# Patient Record
Sex: MALE | Race: WHITE | NOT HISPANIC OR LATINO | Employment: FULL TIME | URBAN - NONMETROPOLITAN AREA
[De-identification: names, ages, dates, MRNs, and addresses within clinical notes are randomized per-mention and may not be internally consistent; named-entity substitution may affect disease eponyms.]

---

## 2017-01-26 DIAGNOSIS — I10 ESSENTIAL HYPERTENSION: Primary | ICD-10-CM

## 2017-01-30 RX ORDER — METOPROLOL SUCCINATE 100 MG/1
TABLET, EXTENDED RELEASE ORAL
Qty: 90 TABLET | Refills: 2 | Status: SHIPPED | OUTPATIENT
Start: 2017-01-30 | End: 2017-04-20

## 2017-01-30 NOTE — TELEPHONE ENCOUNTER
Metoprolol ER Succ 100mg tab      Last Written Prescription Date: 9-  Last Fill Quantity: 90, # refills: 0  Last Office Visit with [facility], [facility] or [facility] prescribing provider: 11-       POTASSIUM   Date Value Ref Range Status   04/15/2016 4.3 3.4 - 5.3 mmol/L Final     CREATININE   Date Value Ref Range Status   04/15/2016 0.77 0.66 - 1.25 mg/dL Final     BP Readings from Last 3 Encounters:   11/15/16 132/81   10/05/16 157/90   09/27/16 170/99

## 2017-02-03 DIAGNOSIS — I10 ESSENTIAL HYPERTENSION: ICD-10-CM

## 2017-02-03 DIAGNOSIS — E78.00 PURE HYPERCHOLESTEROLEMIA: Primary | ICD-10-CM

## 2017-02-03 DIAGNOSIS — E78.2 ELEVATED TRIGLYCERIDES WITH HIGH CHOLESTEROL: ICD-10-CM

## 2017-02-03 RX ORDER — GEMFIBROZIL 600 MG/1
600 TABLET, FILM COATED ORAL 2 TIMES DAILY
Qty: 180 TABLET | Refills: 0 | Status: SHIPPED | OUTPATIENT
Start: 2017-02-03 | End: 2017-04-06

## 2017-02-03 RX ORDER — LISINOPRIL 10 MG/1
10 TABLET ORAL DAILY
Qty: 90 TABLET | Refills: 2 | Status: SHIPPED | OUTPATIENT
Start: 2017-02-03 | End: 2017-09-26

## 2017-02-03 RX ORDER — ATORVASTATIN CALCIUM 10 MG/1
10 TABLET, FILM COATED ORAL DAILY
Qty: 90 TABLET | Refills: 0 | Status: SHIPPED | OUTPATIENT
Start: 2017-02-03 | End: 2017-04-06

## 2017-04-06 DIAGNOSIS — E78.00 PURE HYPERCHOLESTEROLEMIA: ICD-10-CM

## 2017-04-06 DIAGNOSIS — E78.2 ELEVATED TRIGLYCERIDES WITH HIGH CHOLESTEROL: ICD-10-CM

## 2017-04-07 RX ORDER — GEMFIBROZIL 600 MG/1
TABLET, FILM COATED ORAL
Qty: 180 TABLET | Refills: 0 | Status: SHIPPED | OUTPATIENT
Start: 2017-04-07 | End: 2017-06-05

## 2017-04-07 RX ORDER — ATORVASTATIN CALCIUM 10 MG/1
TABLET, FILM COATED ORAL
Qty: 90 TABLET | Refills: 0 | Status: SHIPPED | OUTPATIENT
Start: 2017-04-07 | End: 2017-06-05

## 2017-04-07 NOTE — TELEPHONE ENCOUNTER
lipitor     Last Written Prescription Date: 2/3/17  Last Fill Quantity: 90, # refills: 0  Last Office Visit with [facility], [facility] or [facility] prescribing provider: 11/15/16       Lab Results   Component Value Date    CHOL 155 11/30/2015     Lab Results   Component Value Date    HDL 35 11/30/2015     Lab Results   Component Value Date    LDL 87 11/30/2015     Lab Results   Component Value Date    TRIG 163 11/30/2015     Lab Results   Component Value Date    CHOLHDLRATIO 4.6 10/02/2014     lopid       Last Written Prescription Date: 2/3/17  Last Fill Quantity: 180, # refills: 0    Last Office Visit with [facility], [facility] or [facility] prescribing provider:  11/15/16   Future Office Visit:      Cholesterol   Date Value Ref Range Status   11/30/2015 155 <200 mg/dL Final     HDL Cholesterol   Date Value Ref Range Status   11/30/2015 35 (L) >39 mg/dL Final     LDL Cholesterol Calculated   Date Value Ref Range Status   11/30/2015 87 <100 mg/dL Final     Comment:     Desirable:       <100 mg/dl     Triglycerides   Date Value Ref Range Status   11/30/2015 163 (H) <150 mg/dL Final     Comment:     Borderline high:  150-199 mg/dl   High:             200-499 mg/dl   Very high:       >499 mg/dl   Fasting specimen       Cholesterol/HDL Ratio   Date Value Ref Range Status   10/02/2014 4.6 0.0 - 5.0 Final     ALT   Date Value Ref Range Status   01/29/2014 67 12 - 78 U/L Final

## 2017-04-16 ENCOUNTER — HOSPITAL ENCOUNTER (EMERGENCY)
Facility: HOSPITAL | Age: 47
Discharge: HOME | End: 2017-04-16
Attending: PHYSICIAN ASSISTANT | Admitting: PHYSICIAN ASSISTANT
Payer: COMMERCIAL

## 2017-04-16 VITALS — OXYGEN SATURATION: 97 % | DIASTOLIC BLOOD PRESSURE: 93 MMHG | SYSTOLIC BLOOD PRESSURE: 149 MMHG | TEMPERATURE: 98.6 F

## 2017-04-16 DIAGNOSIS — Z23 NEED FOR DIPHTHERIA-TETANUS-PERTUSSIS (TDAP) VACCINE, ADULT/ADOLESCENT: ICD-10-CM

## 2017-04-16 DIAGNOSIS — L03.012 PARONYCHIA OF LEFT THUMB: ICD-10-CM

## 2017-04-16 PROCEDURE — 90471 IMMUNIZATION ADMIN: CPT

## 2017-04-16 PROCEDURE — 99214 OFFICE O/P EST MOD 30 MIN: CPT | Mod: 25

## 2017-04-16 PROCEDURE — 90715 TDAP VACCINE 7 YRS/> IM: CPT | Performed by: PHYSICIAN ASSISTANT

## 2017-04-16 PROCEDURE — 25000125 ZZHC RX 250: Performed by: PHYSICIAN ASSISTANT

## 2017-04-16 PROCEDURE — 99213 OFFICE O/P EST LOW 20 MIN: CPT | Performed by: PHYSICIAN ASSISTANT

## 2017-04-16 RX ORDER — MUPIROCIN 20 MG/G
OINTMENT TOPICAL 2 TIMES DAILY
Qty: 15 G | Refills: 0 | Status: SHIPPED | OUTPATIENT
Start: 2017-04-16 | End: 2018-03-04

## 2017-04-16 RX ORDER — SULFAMETHOXAZOLE/TRIMETHOPRIM 800-160 MG
1 TABLET ORAL 2 TIMES DAILY
Qty: 20 TABLET | Refills: 0 | Status: SHIPPED | OUTPATIENT
Start: 2017-04-16 | End: 2017-04-26

## 2017-04-16 RX ADMIN — CLOSTRIDIUM TETANI TOXOID ANTIGEN (FORMALDEHYDE INACTIVATED), CORYNEBACTERIUM DIPHTHERIAE TOXOID ANTIGEN (FORMALDEHYDE INACTIVATED), BORDETELLA PERTUSSIS TOXOID ANTIGEN (GLUTARALDEHYDE INACTIVATED), BORDETELLA PERTUSSIS FILAMENTOUS HEMAGGLUTININ ANTIGEN (FORMALDEHYDE INACTIVATED), BORDETELLA PERTUSSIS PERTACTIN ANTIGEN, AND BORDETELLA PERTUSSIS FIMBRIAE 2/3 ANTIGEN 0.5 ML: 5; 2; 2.5; 5; 3; 5 INJECTION, SUSPENSION INTRAMUSCULAR at 11:14

## 2017-04-16 NOTE — ED AVS SNAPSHOT
[facility]    [address] [place] [address]    Phone:  [phone]                                       Jose J Marley   MRN: [id number]    Department:  [facility]   Date of Visit:  4/16/2017           After Visit Summary Signature Page     I have received my discharge instructions, and my questions have been answered. I have discussed any challenges I see with this plan with the nurse or doctor.    ..........................................................................................................................................  Patient/Patient Representative Signature      ..........................................................................................................................................  Patient Representative Print Name and Relationship to Patient    ..................................................               ................................................  Date                                            Time    ..........................................................................................................................................  Reviewed by Signature/Title    ...................................................              ..............................................  Date                                                            Time

## 2017-04-16 NOTE — ED AVS SNAPSHOT
[facility]    [address]    [place] [address]    Phone:  [phone]                                       Jose J Marley   MRN: [id number]    Department:  [facility]   Date of Visit:  4/16/2017           Patient Information     Date Of Birth          1970        Your diagnoses for this visit were:     Paronychia of left thumb     Need for diphtheria-tetanus-pertussis (Tdap) vaccine, adult/adolescent        You were seen by Safia Marcus PA.      Follow-up Information     Follow up with KRISTINE Crockett MD.    Specialty:  Family Practice    Why:  If symptoms worsen    Contact information:    [facility] [place]  [address]  [place] [address]  [phone]          Follow up with [facility].    Specialty:  EMERGENCY MEDICINE    Why:  IF FURTHER CONCERNS DEVELOP    Contact information:    [address] [place], [place] [address] [address] [phone]    Additional information:    From [facility]: Take [address]. Turn left at [address]. Turn left at the first stoplight on [address]. At the first stop sign, take a right onto [address]. Take a left into the parking lot and continue through until you reach the North enterance of the building.       From [place]: Take [address]. Take the [address] ramp towards [place]. Turn left onto [address]. Take a slight right onto [address]. Turn left at the first stoplight on [address]. At the first stop sign, take a right onto [address]. Take a left into the parking lot and continue through until you reach the North enterance of the building.       From [place]: Take [address]. Take a right at [address]. At the first stop sign, take a right onto [address]. Take a left into the parking lot and continue through until you reach the North enterance of the building.         Discharge Instructions        REMEMBER:  AIR DRY AS MUCH AS POSSIBLE    Discharge References/Attachments     PARONYCHIA (ENGLISH)         Review of your medicines      START taking        Dose / Directions Last dose taken    mupirocin 2 % ointment   Commonly known as:  BACTROBAN   Quantity:  15 g        Apply topically 2 times daily   Refills:  0        sulfamethoxazole-trimethoprim 800-160 MG per tablet   Commonly known as:  BACTRIM DS/SEPTRA DS   Dose:  1 tablet   Quantity:  20 tablet        Take 1 tablet by mouth 2 times daily for 10 days   Refills:  0          Our records show that you are taking the medicines listed below. If these are incorrect, please call your family doctor or clinic.        Dose / Directions Last dose taken    aspirin 81 MG tablet        Take  by mouth daily.   Refills:  0        atorvastatin 10 MG tablet   Commonly known as:  LIPITOR   Quantity:  90 tablet        Take 1 tablet by mouth  daily   Refills:  0        fish oil-omega-3 fatty acids 1000 MG capsule   Dose:  1 g        Take 1 g by mouth 2 times daily.   Refills:  0        GARLIC        daily.   Refills:  0        gemfibrozil 600 MG tablet   Commonly known as:  LOPID   Quantity:  180 tablet        Take 1 tablet by mouth two  times daily   Refills:  0        IBUPROFEN PO   Dose:  800 mg        Take 800 mg by mouth every 6 hours as needed for moderate pain   Refills:  0        lisinopril 10 MG tablet   Commonly known as:  PRINIVIL/ZESTRIL   Dose:  10 mg   Quantity:  90 tablet        Take 1 tablet (10 mg) by mouth daily   Refills:  2        metoprolol 100 MG 24 hr tablet   Commonly known as:  TOPROL-XL   Quantity:  90 tablet        TAKE ONE TABLET BY MOUTH DAILY   Refills:  2        MULTIVITAMIN PO        Take  by mouth daily.   Refills:  0                Prescriptions were sent or printed at these locations (2 Prescriptions)                   [id number] - [place] - [address] AT [facility] [address], [place]  [address]    Telephone:  [phone]   Fax:  [phone]   Hours:                  E-Prescribed (2 of 2)         sulfamethoxazole-trimethoprim (BACTRIM DS/SEPTRA DS) 800-160 MG per tablet               mupirocin (BACTROBAN) 2 % ointment                Orders Needing Specimen Collection     None      Pending Results     No orders found from 4/14/2017 to 4/17/2017.            Pending Culture Results     No orders found from 4/14/2017 to 4/17/2017.            Thank you for choosing [facility]       Thank you for choosing [facility] for your care. Our goal is always to provide you with excellent care. Hearing back from our patients is one way we can continue to improve our services. Please take a few minutes to complete the written survey that you may receive in the mail after you visit with us. Thank you!        [facility] Information     [facility] gives you secure access to your electronic health record. If you see a primary care provider, you can also send messages to your care team and make appointments. If you have questions, please call your primary care clinic.  If you do not have a primary care provider, please call [phone] and they will assist you.        Care EveryWhere ID     This is your Care EveryWhere ID. This could be used by other organizations to access your [facility] medical records  [id number]        After Visit Summary       This is your record. Keep this with you and show to your community pharmacist(s) and doctor(s) at your next visit.

## 2017-04-16 NOTE — ED NOTES
C/o left thumb pain and redness that started this morning. States picked at a hang nail yesterday. Denies being diabetic. Rates pain 6/10 and took Ibuprofen 800 mg an hour before coming in. Denies fevers. Unsure of tetanus status.

## 2017-04-16 NOTE — ED NOTES
Pt presents with reddened and swollen left thumb. Pt states there was green pus coming from wound this morning. He clipped a hangnail last night from left thumb.

## 2017-04-17 ASSESSMENT — ENCOUNTER SYMPTOMS
WOUND: 1
CONSTITUTIONAL NEGATIVE: 1
CARDIOVASCULAR NEGATIVE: 1
PSYCHIATRIC NEGATIVE: 1
NEUROLOGICAL NEGATIVE: 1

## 2017-04-17 NOTE — ED PROVIDER NOTES
History     Chief Complaint   Patient presents with     Wound Infection     left thumb redness and swelling around hang nail that he pulled off. States started this morning.      The history is provided by the patient.     Jose J Marley is a 47 year old male who has one day of developing left thumb redness/swelling and pain. Pt admits he bites and pick at his cuticles often. Denies fever or any other injury at this time    Allergies as of 04/16/2017     (No Known Allergies)     Discharge Medication List as of 4/16/2017 11:07 AM      START taking these medications    Details   sulfamethoxazole-trimethoprim (BACTRIM DS/SEPTRA DS) 800-160 MG per tablet Take 1 tablet by mouth 2 times daily for 10 days, Disp-20 tablet, R-0, E-Prescribe      mupirocin (BACTROBAN) 2 % ointment Apply topically 2 times dailyDisp-15 g, [id number]         CONTINUE these medications which have NOT CHANGED    Details   IBUPROFEN PO Take 800 mg by mouth every 6 hours as needed for moderate pain, Historical      gemfibrozil (LOPID) 600 MG tablet Take 1 tablet by mouth two  times daily, Disp-180 tablet, R-0, E-Prescribe      atorvastatin (LIPITOR) 10 MG tablet Take 1 tablet by mouth  daily, Disp-90 tablet, R-0, E-Prescribe      lisinopril (PRINIVIL/ZESTRIL) 10 MG tablet Take 1 tablet (10 mg) by mouth daily, Disp-90 tablet, R-2, E-Prescribe      metoprolol (TOPROL-XL) 100 MG 24 hr tablet TAKE ONE TABLET BY MOUTH DAILY, Disp-90 tablet, R-2, E-Prescribe      aspirin 81 MG tablet Take  by mouth daily., Historical      fish oil-omega-3 fatty acids (FISH OIL) 1000 MG capsule Take 1 g by mouth 2 times daily., Historical      GARLIC daily., Historical      Multiple Vitamins-Minerals (MULTIVITAMIN OR) Take  by mouth daily., Historical           Past Medical History:   Diagnosis Date     Obesity, unspecified 01/01/2011     Pure hypercholesterolemia 08/04/2008     Unspecified essential hypertension 12/20/2006     Past Surgical History:   Procedure  Laterality Date     EXCISE MASS FINGER Right 4/21/2016    Procedure: EXCISE MASS FINGER;  Surgeon: Bimal Mitchell MD;  Location: [facility]     HERNIA REPAIR  2011    umbilical     HERNIA REPAIR  1972    inguinal     Family History   Problem Relation Age of Onset     C.A.D. Maternal Grandmother 67     cause of death     Hypertension Mother      Lipids Mother      hyperlipidemia     Social History     Social History     Marital status:      Spouse name: N/A     Number of children: N/A     Years of education: N/A     Occupational History     [employer]  - Full-Time      Social History Main Topics     Smoking status: Never Smoker     Smokeless tobacco: Never Used     Alcohol use Yes      Comment: Rarely      Drug use: No     Sexual activity: Not Asked     Other Topics Concern     Caffeine Concern Yes     Soda, 24 oz daily      Parent/Sibling W/ Cabg, Mi Or Angioplasty Before 65f 55m? Yes     father     Social History Narrative         I have reviewed the Medications, Allergies, Past Medical and Surgical History, and Social History in the Epic system.    Review of Systems   Constitutional: Negative.    Cardiovascular: Negative.    Skin: Positive for wound.   Neurological: Negative.    Psychiatric/Behavioral: Negative.        Physical Exam   BP: 149/93  Heart Rate: 88  Temp: 98.6  F (37  C)  SpO2: 97 %  Physical Exam   Constitutional: He is oriented to person, place, and time. He appears well-developed and well-nourished. No distress.   Cardiovascular: Normal rate.    Pulmonary/Chest: Effort normal.   Musculoskeletal:   Left thumb: medial side with moderate edema/erythema. No area of fluctuance.  +AFROM, 5/5 strength. M/n/v intact. Moderate TTP   Neurological: He is alert and oriented to person, place, and time.   Skin: He is not diaphoretic.   Psychiatric: He has a normal mood and affect.   Nursing note and vitals reviewed.      ED Course     ED Course     Procedures         Medications   Tdap  (tetanus-diphtheria-acell pertussis) (ADACEL) injection 0.5 mL (0.5 mLs Intramuscular Given 4/16/17 1114)   pt observed x 20 minutes. Pt tolerated well      Assessments & Plan (with Medical Decision Making)     I have reviewed the nursing notes.    I have reviewed the findings, diagnosis, plan and need for follow up with the patient.    Discharge Medication List as of 4/16/2017 11:07 AM      START taking these medications    Details   sulfamethoxazole-trimethoprim (BACTRIM DS/SEPTRA DS) 800-160 MG per tablet Take 1 tablet by mouth 2 times daily for 10 days, Disp-20 tablet, R-0, E-Prescribe      mupirocin (BACTROBAN) 2 % ointment Apply topically 2 times dailyDisp-15 g, [id number]             Final diagnoses:   Paronychia of left thumb   Need for diphtheria-tetanus-pertussis (Tdap) vaccine, adult/adolescent         Stop picking at your cuticles!!!!  Keep area clean/dry  Patient verbally educated and given appropriate education sheets for the diagnoses and has no questions.  Take medications as directed.   Follow up with your Primary Care provider if symptoms increase or if concerns develop, return to the ER  Safia Marcus Certified  Physician Assistant  4/17/2017  6:05 PM  URGENT CARE CLINIC      4/16/2017   [facility]     Safia Marcus PA  04/17/17 [id number]

## 2017-04-20 DIAGNOSIS — I10 ESSENTIAL HYPERTENSION: ICD-10-CM

## 2017-04-20 RX ORDER — METOPROLOL SUCCINATE 100 MG/1
100 TABLET, EXTENDED RELEASE ORAL DAILY
Qty: 90 TABLET | Refills: 1 | Status: SHIPPED | OUTPATIENT
Start: 2017-04-20 | End: 2017-08-05

## 2017-06-05 DIAGNOSIS — E78.00 PURE HYPERCHOLESTEROLEMIA: ICD-10-CM

## 2017-06-05 DIAGNOSIS — E78.2 ELEVATED TRIGLYCERIDES WITH HIGH CHOLESTEROL: ICD-10-CM

## 2017-06-07 RX ORDER — GEMFIBROZIL 600 MG/1
TABLET, FILM COATED ORAL
Qty: 180 TABLET | Refills: 0 | Status: SHIPPED | OUTPATIENT
Start: 2017-06-07 | End: 2017-10-25

## 2017-06-07 RX ORDER — ATORVASTATIN CALCIUM 10 MG/1
TABLET, FILM COATED ORAL
Qty: 90 TABLET | Refills: 0 | Status: SHIPPED | OUTPATIENT
Start: 2017-06-07 | End: 2017-08-05

## 2017-08-05 DIAGNOSIS — E78.00 PURE HYPERCHOLESTEROLEMIA: ICD-10-CM

## 2017-08-05 DIAGNOSIS — I10 ESSENTIAL HYPERTENSION: ICD-10-CM

## 2017-08-05 NOTE — LETTER
[facility] [place]  [address] [place] [address]  Phone: [phone]    August 9, 2017       Jose J Marley  [address] [place] [address]            Dear Jose J:    We are concerned about your health care.  We recently provided you with medication refills.  Lab tests (Lipid Profile) are required every 12 months in order to continue refilling your Lipitor.  Orders have been placed in our computer and should be accessible at most [facility] labs. Please complete this lab work as soon as possible.        Thank You,      Kris Crockett MD

## 2017-08-09 RX ORDER — METOPROLOL SUCCINATE 100 MG/1
TABLET, EXTENDED RELEASE ORAL
Qty: 90 TABLET | Refills: 0 | Status: SHIPPED | OUTPATIENT
Start: 2017-08-09 | End: 2017-10-25

## 2017-08-09 RX ORDER — ATORVASTATIN CALCIUM 10 MG/1
TABLET, FILM COATED ORAL
Qty: 30 TABLET | Refills: 0 | Status: SHIPPED | OUTPATIENT
Start: 2017-08-09 | End: 2017-10-25

## 2017-08-09 NOTE — TELEPHONE ENCOUNTER
lipitor     Last Written Prescription Date: 6/7/17  Last Fill Quantity: 90, # refills: 0  Last Office Visit with [facility] or [facility] prescribing provider: 11/15/16       Lab Results   Component Value Date    CHOL 155 11/30/2015     Lab Results   Component Value Date    HDL 35 11/30/2015     Lab Results   Component Value Date    LDL 87 11/30/2015     Lab Results   Component Value Date    TRIG 163 11/30/2015     Lab Results   Component Value Date    CHOLHDLRATIO 4.6 10/02/2014     metoprolol      Last Written Prescription Date: 4/20/17  Last Fill Quantity: 90, # refills: 1  Last Office Visit with [facility], [facility] or [facility] prescribing provider: 11/15/16       Potassium   Date Value Ref Range Status   04/15/2016 4.3 3.4 - 5.3 mmol/L Final     Creatinine   Date Value Ref Range Status   04/15/2016 0.77 0.66 - 1.25 mg/dL Final     BP Readings from Last 3 Encounters:   04/16/17 149/93   11/15/16 132/81   10/05/16 157/90

## 2017-09-19 ENCOUNTER — MYC MEDICAL ADVICE (OUTPATIENT)
Dept: FAMILY MEDICINE | Facility: OTHER | Age: 47
End: 2017-09-19
Payer: COMMERCIAL

## 2017-09-19 ENCOUNTER — RESULTS ONLY (OUTPATIENT)
Dept: FAMILY MEDICINE | Facility: OTHER | Age: 47
End: 2017-09-19

## 2017-09-19 DIAGNOSIS — E78.00 PURE HYPERCHOLESTEROLEMIA: ICD-10-CM

## 2017-09-19 DIAGNOSIS — Z13.1 SCREENING FOR DIABETES MELLITUS: Primary | ICD-10-CM

## 2017-09-19 LAB
CHOLEST SERPL-MCNC: 142 MG/DL
EST. AVERAGE GLUCOSE BLD GHB EST-MCNC: 134 MG/DL
HBA1C MFR BLD: 6.3 % (ref 4.3–6)
HDLC SERPL-MCNC: 36 MG/DL
LDLC SERPL CALC-MCNC: 84 MG/DL
NONHDLC SERPL-MCNC: 106 MG/DL
TRIGL SERPL-MCNC: 112 MG/DL

## 2017-09-19 PROCEDURE — 83036 HEMOGLOBIN GLYCOSYLATED A1C: CPT | Performed by: FAMILY MEDICINE

## 2017-09-19 PROCEDURE — 36415 COLL VENOUS BLD VENIPUNCTURE: CPT | Performed by: FAMILY MEDICINE

## 2017-09-19 PROCEDURE — 80061 LIPID PANEL: CPT | Performed by: FAMILY MEDICINE

## 2017-09-19 PROCEDURE — 40000788 ZZHCL STATISTIC ESTIMATED AVERAGE GLUCOSE: Performed by: FAMILY MEDICINE

## 2017-09-26 DIAGNOSIS — I10 ESSENTIAL HYPERTENSION: ICD-10-CM

## 2017-09-27 RX ORDER — LISINOPRIL 10 MG/1
TABLET ORAL
Qty: 90 TABLET | Refills: 0 | Status: SHIPPED | OUTPATIENT
Start: 2017-09-27 | End: 2017-10-25

## 2017-10-25 ENCOUNTER — TELEPHONE (OUTPATIENT)
Dept: FAMILY MEDICINE | Facility: OTHER | Age: 47
End: 2017-10-25

## 2017-10-25 DIAGNOSIS — I10 ESSENTIAL HYPERTENSION: ICD-10-CM

## 2017-10-25 DIAGNOSIS — E78.00 PURE HYPERCHOLESTEROLEMIA: ICD-10-CM

## 2017-10-25 DIAGNOSIS — E78.2 ELEVATED TRIGLYCERIDES WITH HIGH CHOLESTEROL: ICD-10-CM

## 2017-10-25 RX ORDER — METOPROLOL SUCCINATE 100 MG/1
TABLET, EXTENDED RELEASE ORAL
Qty: 4 TABLET | Refills: 0 | Status: SHIPPED | OUTPATIENT
Start: 2017-10-25 | End: 2017-12-17

## 2017-10-25 RX ORDER — GEMFIBROZIL 600 MG/1
TABLET, FILM COATED ORAL
Qty: 8 TABLET | Refills: 0 | Status: SHIPPED | OUTPATIENT
Start: 2017-10-25 | End: 2017-12-07

## 2017-10-25 RX ORDER — ATORVASTATIN CALCIUM 10 MG/1
TABLET, FILM COATED ORAL
Qty: 4 TABLET | Refills: 0 | Status: SHIPPED | OUTPATIENT
Start: 2017-10-25 | End: 2017-12-07

## 2017-10-25 RX ORDER — LISINOPRIL 10 MG/1
TABLET ORAL
Qty: 4 TABLET | Refills: 0 | Status: SHIPPED | OUTPATIENT
Start: 2017-10-25 | End: 2017-12-16

## 2017-10-25 NOTE — TELEPHONE ENCOUNTER
Reason for call:  Medication    1. Medication Name? Gemfibrozil, lipitor, lisinopril, and metoprolol  2. Is this request for a refill? Yes  3. What Pharmacy do you use? [facility], [place] - [address] - [phone]  4. Have you contacted your pharmacy? No    5. If yes, when?  (Please note that the turn-around-time for prescriptions is 72 business hours; I am sending your request at this time. SEND TO  [facility]  )  Description: Pt is on his way to location listed above and forgot his medications. Hoping a 4 days supply can be sent to this pharmacy to get him through until he is back home. Please call pt back at [phone]  Was an appointment offered for this a call? No   Preferred method for responding to this messageTelephone Call  If we cannot reach you directly, may we leave a detailed response at the number you provided? Yes  Can this message wait until your PCP/Provider returns if not available today? No, PCP out

## 2017-10-30 RX ORDER — ATORVASTATIN CALCIUM 10 MG/1
TABLET, FILM COATED ORAL
Qty: 90 TABLET | Refills: 0 | OUTPATIENT
Start: 2017-10-30

## 2017-10-30 RX ORDER — GEMFIBROZIL 600 MG/1
TABLET, FILM COATED ORAL
Qty: 180 TABLET | Refills: 0 | OUTPATIENT
Start: 2017-10-30

## 2017-12-07 DIAGNOSIS — E78.2 ELEVATED TRIGLYCERIDES WITH HIGH CHOLESTEROL: ICD-10-CM

## 2017-12-07 DIAGNOSIS — E78.00 PURE HYPERCHOLESTEROLEMIA: ICD-10-CM

## 2017-12-07 NOTE — TELEPHONE ENCOUNTER
Lipitor       Last Written Prescription Date: 10/25/17  Last Fill Quantity: 4,  # refills: 0   Last Office Visit with [facility], [facility] or [facility] prescribing provider: 11/15/16    Lopid       Last Written Prescription Date: 10/25/17  Last Fill Quantity: 8,  # refills: 0   Last Office Visit with [facility], [facility] or [facility] prescribing provider: 11/15/16

## 2017-12-07 NOTE — LETTER
[facility] [place]  [address] [place] [address]              Jose J Marley  [address]  [place] [address]      December 8, 2017       Dear Jose J Marley,    APPOINTMENT REMINDER:       Our record indicates that it is time for you to be seen for an annual exam.    You may call our office at [phone] to schedule an appointment.    Please disregard this notice if you have already made an appointment.      Sincerely,    Dr. Arnold Crockett MD  Family Practice

## 2017-12-08 RX ORDER — GEMFIBROZIL 600 MG/1
TABLET, FILM COATED ORAL
Qty: 60 TABLET | Refills: 0 | Status: SHIPPED | OUTPATIENT
Start: 2017-12-08 | End: 2018-01-25

## 2017-12-08 RX ORDER — ATORVASTATIN CALCIUM 10 MG/1
TABLET, FILM COATED ORAL
Qty: 30 TABLET | Refills: 0 | Status: SHIPPED | OUTPATIENT
Start: 2017-12-08 | End: 2018-01-25

## 2017-12-16 DIAGNOSIS — I10 ESSENTIAL HYPERTENSION: ICD-10-CM

## 2017-12-17 DIAGNOSIS — I10 ESSENTIAL HYPERTENSION: ICD-10-CM

## 2017-12-19 RX ORDER — LISINOPRIL 10 MG/1
TABLET ORAL
Qty: 30 TABLET | Refills: 0 | Status: SHIPPED | OUTPATIENT
Start: 2017-12-19 | End: 2018-01-25

## 2017-12-19 RX ORDER — METOPROLOL SUCCINATE 100 MG/1
TABLET, EXTENDED RELEASE ORAL
Qty: 90 TABLET | Refills: 0 | Status: SHIPPED | OUTPATIENT
Start: 2017-12-19 | End: 2018-01-25

## 2018-01-16 ENCOUNTER — TELEPHONE (OUTPATIENT)
Dept: FAMILY MEDICINE | Facility: OTHER | Age: 48
End: 2018-01-16

## 2018-01-16 DIAGNOSIS — I10 BENIGN ESSENTIAL HYPERTENSION: Primary | ICD-10-CM

## 2018-01-16 DIAGNOSIS — E78.00 PURE HYPERCHOLESTEROLEMIA: ICD-10-CM

## 2018-01-16 DIAGNOSIS — R73.03 PRE-DIABETES: ICD-10-CM

## 2018-01-16 NOTE — TELEPHONE ENCOUNTER
4:44 PM    Reason for Call: Phone Call    Description: Pt calling and has physical on 01/25/18 and would like orders for labs to be put in so he can have them done one to two days before his appointment. Please and thank you. Any questions please call his cell.    Was an appointment offered for this call? Yes  If yes : Appointment type Physical              Date 01/25/18    Preferred method for responding to this message: Telephone Call  What is your phone number ?    If we cannot reach you directly, may we leave a detailed response at the number you provided? Yes    Can this message wait until your PCP/provider returns, if available today?     Lisa Manzo

## 2018-01-23 DIAGNOSIS — R73.03 PRE-DIABETES: ICD-10-CM

## 2018-01-23 DIAGNOSIS — I10 BENIGN ESSENTIAL HYPERTENSION: ICD-10-CM

## 2018-01-23 DIAGNOSIS — E78.00 PURE HYPERCHOLESTEROLEMIA: ICD-10-CM

## 2018-01-23 LAB
ANION GAP SERPL CALCULATED.3IONS-SCNC: 8 MMOL/L (ref 3–14)
AST SERPL W P-5'-P-CCNC: 15 U/L (ref 0–45)
BUN SERPL-MCNC: 14 MG/DL (ref 7–30)
CALCIUM SERPL-MCNC: 9.2 MG/DL (ref 8.5–10.1)
CHLORIDE SERPL-SCNC: 105 MMOL/L (ref 94–109)
CHOLEST SERPL-MCNC: 147 MG/DL
CO2 SERPL-SCNC: 25 MMOL/L (ref 20–32)
CREAT SERPL-MCNC: 0.68 MG/DL (ref 0.66–1.25)
EST. AVERAGE GLUCOSE BLD GHB EST-MCNC: 140 MG/DL
GFR SERPL CREATININE-BSD FRML MDRD: >90 ML/MIN/1.7M2
GLUCOSE SERPL-MCNC: 107 MG/DL (ref 70–99)
HBA1C MFR BLD: 6.5 % (ref 4.3–6)
HDLC SERPL-MCNC: 37 MG/DL
LDLC SERPL CALC-MCNC: 68 MG/DL
NONHDLC SERPL-MCNC: 110 MG/DL
POTASSIUM SERPL-SCNC: 4.4 MMOL/L (ref 3.4–5.3)
SODIUM SERPL-SCNC: 138 MMOL/L (ref 133–144)
TRIGL SERPL-MCNC: 210 MG/DL

## 2018-01-23 PROCEDURE — 40000788 ZZHCL STATISTIC ESTIMATED AVERAGE GLUCOSE: Performed by: FAMILY MEDICINE

## 2018-01-23 PROCEDURE — 84450 TRANSFERASE (AST) (SGOT): CPT | Performed by: FAMILY MEDICINE

## 2018-01-23 PROCEDURE — 80061 LIPID PANEL: CPT | Performed by: FAMILY MEDICINE

## 2018-01-23 PROCEDURE — 83036 HEMOGLOBIN GLYCOSYLATED A1C: CPT | Performed by: FAMILY MEDICINE

## 2018-01-23 PROCEDURE — 80048 BASIC METABOLIC PNL TOTAL CA: CPT | Performed by: FAMILY MEDICINE

## 2018-01-23 PROCEDURE — 36415 COLL VENOUS BLD VENIPUNCTURE: CPT | Performed by: FAMILY MEDICINE

## 2018-01-25 ENCOUNTER — OFFICE VISIT (OUTPATIENT)
Dept: FAMILY MEDICINE | Facility: OTHER | Age: 48
End: 2018-01-25
Attending: FAMILY MEDICINE
Payer: COMMERCIAL

## 2018-01-25 VITALS
TEMPERATURE: 98.3 F | DIASTOLIC BLOOD PRESSURE: 86 MMHG | BODY MASS INDEX: 40.66 KG/M2 | OXYGEN SATURATION: 99 % | SYSTOLIC BLOOD PRESSURE: 132 MMHG | HEART RATE: 75 BPM | HEIGHT: 66 IN | WEIGHT: 253 LBS

## 2018-01-25 DIAGNOSIS — E78.00 PURE HYPERCHOLESTEROLEMIA: ICD-10-CM

## 2018-01-25 DIAGNOSIS — Z00.00 ROUTINE GENERAL MEDICAL EXAMINATION AT A HEALTH CARE FACILITY: Primary | ICD-10-CM

## 2018-01-25 DIAGNOSIS — I10 ESSENTIAL HYPERTENSION: ICD-10-CM

## 2018-01-25 DIAGNOSIS — E66.01 MORBID OBESITY (H): ICD-10-CM

## 2018-01-25 DIAGNOSIS — E78.2 ELEVATED TRIGLYCERIDES WITH HIGH CHOLESTEROL: ICD-10-CM

## 2018-01-25 PROCEDURE — 99396 PREV VISIT EST AGE 40-64: CPT | Performed by: FAMILY MEDICINE

## 2018-01-25 RX ORDER — METOPROLOL SUCCINATE 100 MG/1
TABLET, EXTENDED RELEASE ORAL
Qty: 90 TABLET | Refills: 3 | Status: SHIPPED | OUTPATIENT
Start: 2018-01-25 | End: 2018-12-29

## 2018-01-25 RX ORDER — GEMFIBROZIL 600 MG/1
TABLET, FILM COATED ORAL
Qty: 180 TABLET | Refills: 3 | Status: ON HOLD | OUTPATIENT
Start: 2018-01-25 | End: 2018-08-28

## 2018-01-25 RX ORDER — LISINOPRIL 10 MG/1
TABLET ORAL
Qty: 90 TABLET | Refills: 3 | Status: SHIPPED | OUTPATIENT
Start: 2018-01-25 | End: 2018-04-03 | Stop reason: DRUGHIGH

## 2018-01-25 RX ORDER — ATORVASTATIN CALCIUM 10 MG/1
TABLET, FILM COATED ORAL
Qty: 90 TABLET | Refills: 3 | Status: ON HOLD | OUTPATIENT
Start: 2018-01-25 | End: 2018-08-28

## 2018-01-25 ASSESSMENT — ANXIETY QUESTIONNAIRES
2. NOT BEING ABLE TO STOP OR CONTROL WORRYING: NOT AT ALL
6. BECOMING EASILY ANNOYED OR IRRITABLE: SEVERAL DAYS
4. TROUBLE RELAXING: NOT AT ALL
5. BEING SO RESTLESS THAT IT IS HARD TO SIT STILL: NOT AT ALL
IF YOU CHECKED OFF ANY PROBLEMS ON THIS QUESTIONNAIRE, HOW DIFFICULT HAVE THESE PROBLEMS MADE IT FOR YOU TO DO YOUR WORK, TAKE CARE OF THINGS AT HOME, OR GET ALONG WITH OTHER PEOPLE: NOT DIFFICULT AT ALL
7. FEELING AFRAID AS IF SOMETHING AWFUL MIGHT HAPPEN: SEVERAL DAYS
3. WORRYING TOO MUCH ABOUT DIFFERENT THINGS: SEVERAL DAYS
1. FEELING NERVOUS, ANXIOUS, OR ON EDGE: SEVERAL DAYS
GAD7 TOTAL SCORE: 4

## 2018-01-25 ASSESSMENT — PAIN SCALES - GENERAL: PAINLEVEL: NO PAIN (0)

## 2018-01-25 NOTE — PATIENT INSTRUCTIONS
Preventive Health Recommendations  Male Ages 40 to 49    Yearly exam:             See your health care provider every year in order to  o   Review health changes.   o   Discuss preventive care.    o   Review your medicines if your doctor has prescribed any.    You should be tested each year for STDs (sexually transmitted diseases) if you re at risk.     Have a cholesterol test every 5 years.     Have a colonoscopy (test for colon cancer) if someone in your family has had colon cancer or polyps before age 50.     After age 45, have a diabetes test (fasting glucose). If you are at risk for diabetes, you should have this test every 3 years.      Talk with your health care provider about whether or not a prostate cancer screening test (PSA) is right for you.    Shots: Get a flu shot each year. Get a tetanus shot every 10 years.     Nutrition:    Eat at least 5 servings of fruits and vegetables daily.     Eat whole-grain bread, whole-wheat pasta and brown rice instead of white grains and rice.     Talk to your provider about Calcium and Vitamin D.     Lifestyle    Exercise for at least 150 minutes a week (30 minutes a day, 5 days a week). This will help you control your weight and prevent disease.     Limit alcohol to one drink per day.     No smoking.     Wear sunscreen to prevent skin cancer.     See your dentist every six months for an exam and cleaning.     Call with the referral

## 2018-01-25 NOTE — MR AVS SNAPSHOT
After Visit Summary   1/25/2018    Jose J Marley    MRN: [id number]           Patient Information     Date Of Birth          1970        Visit Information        Provider Department      1/25/2018 3:15 PM KRISTINE Crockett MD [facility] [place]        Today's Diagnoses     Elevated triglycerides with high cholesterol        Essential hypertension        Pure hypercholesterolemia          Care Instructions        Preventive Health Recommendations  Male Ages 40 to 49    Yearly exam:             See your health care provider every year in order to  o   Review health changes.   o   Discuss preventive care.    o   Review your medicines if your doctor has prescribed any.    You should be tested each year for STDs (sexually transmitted diseases) if you re at risk.     Have a cholesterol test every 5 years.     Have a colonoscopy (test for colon cancer) if someone in your family has had colon cancer or polyps before age 50.     After age 45, have a diabetes test (fasting glucose). If you are at risk for diabetes, you should have this test every 3 years.      Talk with your health care provider about whether or not a prostate cancer screening test (PSA) is right for you.    Shots: Get a flu shot each year. Get a tetanus shot every 10 years.     Nutrition:    Eat at least 5 servings of fruits and vegetables daily.     Eat whole-grain bread, whole-wheat pasta and brown rice instead of white grains and rice.     Talk to your provider about Calcium and Vitamin D.     Lifestyle    Exercise for at least 150 minutes a week (30 minutes a day, 5 days a week). This will help you control your weight and prevent disease.     Limit alcohol to one drink per day.     No smoking.     Wear sunscreen to prevent skin cancer.     See your dentist every six months for an exam and cleaning.     Call with the referral             Follow-ups after your visit        Who to contact     If you have questions or need follow  "up information about today's clinic visit or your schedule please contact [facility] directly at [phone].  Normal or non-critical lab and imaging results will be communicated to you by MyChart, letter or phone within 4 business days after the clinic has received the results. If you do not hear from us within 7 days, please contact the clinic through [employer] or phone. If you have a critical or abnormal lab result, we will notify you by phone as soon as possible.  Submit refill requests through [employer] or call your pharmacy and they will forward the refill request to us. Please allow 3 business days for your refill to be completed.          Additional Information About Your Visit        MyChart Information     [employer] gives you secure access to your electronic health record. If you see a primary care provider, you can also send messages to your care team and make appointments. If you have questions, please call your primary care clinic.  If you do not have a primary care provider, please call [phone] and they will assist you.        Care EveryWhere ID     This is your Care EveryWhere ID. This could be used by other organizations to access your [facility] medical records  [id number]        Your Vitals Were     Pulse Temperature Height Pulse Oximetry BMI (Body Mass Index)       75 98.3  F (36.8  C) (Tympanic) 5' 6\" (1.676 m) 99% 40.84 kg/m2        Blood Pressure from Last 3 Encounters:   01/25/18 132/86   04/16/17 149/93   11/15/16 132/81    Weight from Last 3 Encounters:   01/25/18 253 lb (114.8 kg)   11/15/16 253 lb (114.8 kg)   09/27/16 255 lb (115.7 kg)              Today, you had the following     No orders found for display         Today's Medication Changes          These changes are accurate as of 1/25/18  3:24 PM.  If you have any questions, ask your nurse or doctor.               These medicines have changed or have updated prescriptions.        Dose/Directions    lisinopril 10 MG " tablet   Commonly known as:  PRINIVIL/ZESTRIL   This may have changed:  See the new instructions.   Used for:  Essential hypertension   Changed by:  KRISTINE Crockett MD        TAKE 1 TABLET BY MOUTH  DAILY   Quantity:  90 tablet   Refills:  3       metoprolol succinate 100 MG 24 hr tablet   Commonly known as:  TOPROL-XL   This may have changed:  See the new instructions.   Used for:  Essential hypertension   Changed by:  KRISTINE Crockett MD        TAKE 1 TABLET BY MOUTH  DAILY   Quantity:  90 tablet   Refills:  3            Where to get your medicines      These medications were sent to [facility] - [address], [facility] [address]     Phone:  [phone]     atorvastatin 10 MG tablet    gemfibrozil 600 MG tablet    lisinopril 10 MG tablet    metoprolol succinate 100 MG 24 hr tablet                Primary Care Provider Office Phone # Fax #    KRISTINE Crockett [phone]       [facility] [address] [place] [address]        Equal Access to Services     [facility]: Hadii aad ku hadasho Soomaali, waaxda luqadaha, qaybta kaalmada adeegyada, waxay idiin hayaan thuy correia . So [facility] [phone].    ATENCIÓN: Si habla español, tiene a paul disposición servicios gratuitos de asistencia lingüística. [facility] [phone].    We comply with applicable federal civil rights laws and [place] laws. We do not discriminate on the basis of race, color, national origin, age, disability, sex, sexual orientation, or gender identity.            Thank you!     [facility] you [facility]  [facility]. Our goal is always to provide you with excellent care. Hearing back from our patients is one way we can continue to improve our services. Please take a few minutes to complete the written survey that you may receive in the mail after your visit with us. Thank you!             Your Updated Medication  List - Protect others around you: Learn how to safely use, store and throw away your medicines at www.disposemymeds.org.          This list is accurate as of 1/25/18  3:24 PM.  Always use your most recent med list.                   Brand Name Dispense Instructions for use Diagnosis    aspirin 81 MG tablet      Take  by mouth daily.        atorvastatin 10 MG tablet    LIPITOR    90 tablet    Take 1 tablet by mouth  daily    Pure hypercholesterolemia       fish oil-omega-3 fatty acids 1000 MG capsule      Take 1 g by mouth 2 times daily.        GARLIC      daily.        gemfibrozil 600 MG tablet    LOPID    180 tablet    Take 1 tablet by mouth two  times daily    Elevated triglycerides with high cholesterol       IBUPROFEN PO      Take 800 mg by mouth every 6 hours as needed for moderate pain        lisinopril 10 MG tablet    PRINIVIL/ZESTRIL    90 tablet    TAKE 1 TABLET BY MOUTH  DAILY    Essential hypertension       metoprolol succinate 100 MG 24 hr tablet    TOPROL-XL    90 tablet    TAKE 1 TABLET BY MOUTH  DAILY    Essential hypertension       MULTIVITAMIN PO      Take  by mouth daily.        mupirocin 2 % ointment    BACTROBAN    15 g    Apply topically 2 times daily

## 2018-01-25 NOTE — NURSING NOTE
"Chief Complaint   Patient presents with     Physical       Initial /86 (BP Location: Right arm, Patient Position: Chair, Cuff Size: Adult Large)  Pulse 75  Temp 98.3  F (36.8  C) (Tympanic)  Ht 5' 6\" (1.676 m)  Wt 253 lb (114.8 kg)  SpO2 99%  BMI 40.84 kg/m2 Estimated body mass index is 40.84 kg/(m^2) as calculated from the following:    Height as of this encounter: 5' 6\" (1.676 m).    Weight as of this encounter: 253 lb (114.8 kg).  Medication Reconciliation: completecomplete    TOMI MEANS      "

## 2018-01-25 NOTE — PROGRESS NOTES
SUBJECTIVE:   CC: Jose J Marley is an 47 year old male who presents for preventative health visit.     Healthy Habits:    Do you get at least three servings of calcium containing foods daily (dairy, green leafy vegetables, etc.)? yes    Amount of exercise or daily activities, outside of work: 2 day(s) per week    Problems taking medications regularly No    Medication side effects: No    Have you had an eye exam in the past two years? yes    Do you see a dentist twice per year? yes    Do you have sleep apnea, excessive snoring or daytime drowsiness?yes           Today's PHQ-2 Score:   PHQ-2 ( 1999 [employer]) 4/4/2013   Q1: Little interest or pleasure in doing things 0   Q2: Feeling down, depressed or hopeless 0   PHQ-2 Score 0       Abuse: Current or Past(Physical, Sexual or Emotional)- No  Do you feel safe in your environment - Yes    Social History   Substance Use Topics     Smoking status: Never Smoker     Smokeless tobacco: Never Used     Alcohol use Yes      Comment: Rarely       If you drink alcohol do you typically have >3 drinks per day or >7 drinks per week? No                      Last PSA:   PSA   Date Value Ref Range Status   11/30/2015 0.73 0 - 4 ug/L Final       Reviewed orders with patient. Reviewed health maintenance and updated orders accordingly - Yes  BP Readings from Last 3 Encounters:   01/25/18 132/86   04/16/17 149/93   11/15/16 132/81    Wt Readings from Last 3 Encounters:   01/25/18 253 lb (114.8 kg)   11/15/16 253 lb (114.8 kg)   09/27/16 255 lb (115.7 kg)                  Patient Active Problem List   Diagnosis     Preventative health care     Essential hypertension     Pure hypercholesterolemia     ZEB (obstructive sleep apnea)     Morbid obesity (H)     ACP (advance care planning)     Past Surgical History:   Procedure Laterality Date     EXCISE MASS FINGER Right 4/21/2016    Procedure: EXCISE MASS FINGER;  Surgeon: Bimal Mitchell MD;  Location: [facility]     HERNIA REPAIR  2011     umbilical     HERNIA REPAIR  1972    inguinal       Social History   Substance Use Topics     Smoking status: Never Smoker     Smokeless tobacco: Never Used     Alcohol use Yes      Comment: Rarely      Family History   Problem Relation Age of Onset     C.A.D. Maternal Grandmother 67     cause of death     Hypertension Mother      Lipids Mother      hyperlipidemia         Current Outpatient Prescriptions   Medication Sig Dispense Refill     gemfibrozil (LOPID) 600 MG tablet Take 1 tablet by mouth two  times daily 180 tablet 3     metoprolol succinate (TOPROL-XL) 100 MG 24 hr tablet TAKE 1 TABLET BY MOUTH  DAILY 90 tablet 3     lisinopril (PRINIVIL/ZESTRIL) 10 MG tablet TAKE 1 TABLET BY MOUTH  DAILY 90 tablet 3     atorvastatin (LIPITOR) 10 MG tablet Take 1 tablet by mouth  daily 90 tablet 3     IBUPROFEN PO Take 800 mg by mouth every 6 hours as needed for moderate pain       mupirocin (BACTROBAN) 2 % ointment Apply topically 2 times daily 15 g 0     aspirin 81 MG tablet Take  by mouth daily.       fish oil-omega-3 fatty acids (FISH OIL) 1000 MG capsule Take 1 g by mouth 2 times daily.       GARLIC daily.       Multiple Vitamins-Minerals (MULTIVITAMIN OR) Take  by mouth daily.       [DISCONTINUED] lisinopril (PRINIVIL/ZESTRIL) 10 MG tablet TAKE 1 TABLET BY MOUTH  DAILY 30 tablet 0     [DISCONTINUED] metoprolol (TOPROL-XL) 100 MG 24 hr tablet TAKE 1 TABLET BY MOUTH  DAILY 90 tablet 0     [DISCONTINUED] atorvastatin (LIPITOR) 10 MG tablet Take 1 tablet by mouth  daily 30 tablet 0     [DISCONTINUED] gemfibrozil (LOPID) 600 MG tablet Take 1 tablet by mouth two  times daily 60 tablet 0     No Known Allergies    Reviewed and updated as needed this visit by clinical staff  Tobacco  Allergies  Meds  Med Hx  Surg Hx  Fam Hx  Soc Hx        Reviewed and updated as needed this visit by Provider            ROS:  C: NEGATIVE for fever, chills, change in weight  I: NEGATIVE for worrisome rashes, moles or lesions  E: NEGATIVE  "for vision changes or irritation  ENT: NEGATIVE for ear, mouth and throat problems  R: NEGATIVE for significant cough or SOB  CV: NEGATIVE for chest pain, palpitations or peripheral edema  GI: NEGATIVE for nausea, abdominal pain, heartburn, or change in bowel habits   male: negative for dysuria, hematuria, decreased urinary stream, erectile dysfunction, urethral discharge  M: NEGATIVE for significant arthralgias or myalgia  N: NEGATIVE for weakness, dizziness or paresthesias  P: NEGATIVE for changes in mood or affect    OBJECTIVE:   /86 (BP Location: Right arm, Patient Position: Chair, Cuff Size: Adult Large)  Pulse 75  Temp 98.3  F (36.8  C) (Tympanic)  Ht 5' 6\" (1.676 m)  Wt 253 lb (114.8 kg)  SpO2 99%  BMI 40.84 kg/m2  EXAM:  GENERAL: healthy, alert and no distress  EYES: Eyes grossly normal to inspection, PERRL and conjunctivae and sclerae normal  HENT: ear canals and TM's normal, nose and mouth without ulcers or lesions  NECK: no adenopathy, no asymmetry, masses, or scars and thyroid normal to palpation  RESP: lungs clear to auscultation - no rales, rhonchi or wheezes  CV: regular rate and rhythm, normal S1 S2, no S3 or S4, no murmur, click or rub, no peripheral edema and peripheral pulses strong  ABDOMEN: soft, nontender, no hepatosplenomegaly, no masses and bowel sounds normal  : Normal testicles normal penis no inguinal adenopathy no inguinal hernia  MS: no gross musculoskeletal defects noted, no edema  SKIN: no suspicious lesions or rashes  NEURO: Normal strength and tone, mentation intact and speech normal  PSYCH: mentation appears normal, affect normal/bright  LAB:   Results for orders placed or performed in visit on 01/23/18   Hemoglobin A1c   Result Value Ref Range    Hemoglobin A1C 6.5 (H) 4.3 - 6.0 %   Lipid Profile (Chol, Trig, HDL, LDL calc)   Result Value Ref Range    Cholesterol 147 <200 mg/dL    Triglycerides 210 (H) <150 mg/dL    HDL Cholesterol 37 (L) >39 mg/dL    LDL " Cholesterol Calculated 68 <100 mg/dL    Non HDL Cholesterol 110 <130 mg/dL   AST   Result Value Ref Range    AST 15 0 - 45 U/L   Basic metabolic panel   Result Value Ref Range    Sodium 138 133 - 144 mmol/L    Potassium 4.4 3.4 - 5.3 mmol/L    Chloride 105 94 - 109 mmol/L    Carbon Dioxide 25 20 - 32 mmol/L    Anion Gap 8 3 - 14 mmol/L    Glucose 107 (H) 70 - 99 mg/dL    Urea Nitrogen 14 7 - 30 mg/dL    Creatinine 0.68 0.66 - 1.25 mg/dL    GFR Estimate >90 >60 mL/min/1.7m2    GFR Estimate If Black >90 >60 mL/min/1.7m2    Calcium 9.2 8.5 - 10.1 mg/dL   Estimated Average Glucose   Result Value Ref Range    Estimated Average Glucose 140 mg/dL       ANNELISE-7 SCORE 1/25/2018   Total Score 4     PHQ-9 SCORE 11/30/2015 11/15/2016 1/25/2018   Total Score - - -   Total Score 0 0 0       ASSESSMENT/PLAN:       ICD-10-CM    1. Routine general medical examination at a health care facility Z00.00  his main issue is his obesity.  We discussed diet and exercise.  He is going to contact his insurance company to look into getting a gastric sleeve procedure.  His medications were refilled today.  He is just into diabetes.  We had a lengthy discussion regarding proper diet with better food choices and smaller portions.  Also he is can increase his aerobic exercise level.   2. Elevated triglycerides with high cholesterol E78.2 gemfibrozil (LOPID) 600 MG tablet   3. Essential hypertension I10 metoprolol succinate (TOPROL-XL) 100 MG 24 hr tablet     lisinopril (PRINIVIL/ZESTRIL) 10 MG tablet   4. Pure hypercholesterolemia E78.00 atorvastatin (LIPITOR) 10 MG tablet   5. Morbid obesity (H) E66.01        COUNSELING:  Reviewed preventive health counseling, as reflected in patient instructions       Regular exercise       Healthy diet/nutrition       Vision screening       Hearing screening    BP Screening:   Last 3 BP Readings:    BP Readings from Last 3 Encounters:   01/25/18 132/86   04/16/17 149/93   11/15/16 132/81       The following was  "recommended to the patient:  Re-screen BP within a year and recommended lifestyle modifications   reports that he has never smoked. He has never used smokeless tobacco.    Estimated body mass index is 40.84 kg/(m^2) as calculated from the following:    Height as of this encounter: 5' 6\" (1.676 m).    Weight as of this encounter: 253 lb (114.8 kg).   Weight management plan: Patient is going to call his insurance company and look at gastric sleeve    Counseling Resources:  ATP IV Guidelines  Pooled Cohorts Equation Calculator  FRAX Risk Assessment  ICSI Preventive Guidelines  Dietary Guidelines for Americans, 2010  USDA's MyPlate  ASA Prophylaxis  Lung CA Screening    R [place] Crockett MD  [facility] [place]  "

## 2018-01-26 ASSESSMENT — PATIENT HEALTH QUESTIONNAIRE - PHQ9: SUM OF ALL RESPONSES TO PHQ QUESTIONS 1-9: 0

## 2018-01-26 ASSESSMENT — ANXIETY QUESTIONNAIRES: GAD7 TOTAL SCORE: 4

## 2018-01-29 NOTE — TELEPHONE ENCOUNTER
APPT INFO    Date /Time: 2/7/18 at 12PM   Reason for Appt: NBS   Ref Provider/Clinic: Self   Are there internal records? Yes/No?  IF YES, list clinic names: [facility]   Are there outside records? Yes/No? No   Patient Contact (Y/N) & Call Details: No   Action: Chart reviewed

## 2018-02-07 ENCOUNTER — ALLIED HEALTH/NURSE VISIT (OUTPATIENT)
Dept: SURGERY | Facility: CLINIC | Age: 48
End: 2018-02-07
Payer: COMMERCIAL

## 2018-02-07 ENCOUNTER — OFFICE VISIT (OUTPATIENT)
Dept: SURGERY | Facility: CLINIC | Age: 48
End: 2018-02-07
Payer: COMMERCIAL

## 2018-02-07 ENCOUNTER — APPOINTMENT (OUTPATIENT)
Dept: LAB | Facility: CLINIC | Age: 48
End: 2018-02-07
Payer: COMMERCIAL

## 2018-02-07 ENCOUNTER — PRE VISIT (OUTPATIENT)
Dept: SURGERY | Facility: CLINIC | Age: 48
End: 2018-02-07

## 2018-02-07 VITALS
DIASTOLIC BLOOD PRESSURE: 96 MMHG | HEART RATE: 67 BPM | HEIGHT: 66 IN | WEIGHT: 255.7 LBS | BODY MASS INDEX: 41.09 KG/M2 | TEMPERATURE: 98.6 F | SYSTOLIC BLOOD PRESSURE: 149 MMHG | OXYGEN SATURATION: 96 %

## 2018-02-07 DIAGNOSIS — E66.01 MORBID OBESITY (H): Primary | ICD-10-CM

## 2018-02-07 LAB
ALBUMIN SERPL-MCNC: 4.1 G/DL (ref 3.4–5)
ALP SERPL-CCNC: 78 U/L (ref 40–150)
ALT SERPL W P-5'-P-CCNC: 31 U/L (ref 0–70)
ANION GAP SERPL CALCULATED.3IONS-SCNC: 7 MMOL/L (ref 3–14)
AST SERPL W P-5'-P-CCNC: 15 U/L (ref 0–45)
BILIRUB SERPL-MCNC: 0.3 MG/DL (ref 0.2–1.3)
BUN SERPL-MCNC: 19 MG/DL (ref 7–30)
CALCIUM SERPL-MCNC: 9.2 MG/DL (ref 8.5–10.1)
CHLORIDE SERPL-SCNC: 105 MMOL/L (ref 94–109)
CO2 SERPL-SCNC: 26 MMOL/L (ref 20–32)
CREAT SERPL-MCNC: 0.78 MG/DL (ref 0.66–1.25)
ERYTHROCYTE [DISTWIDTH] IN BLOOD BY AUTOMATED COUNT: 14.7 % (ref 10–15)
GFR SERPL CREATININE-BSD FRML MDRD: >90 ML/MIN/1.7M2
GLUCOSE SERPL-MCNC: 103 MG/DL (ref 70–99)
HCT VFR BLD AUTO: 42.1 % (ref 40–53)
HGB BLD-MCNC: 13.4 G/DL (ref 13.3–17.7)
MCH RBC QN AUTO: 25 PG (ref 26.5–33)
MCHC RBC AUTO-ENTMCNC: 31.8 G/DL (ref 31.5–36.5)
MCV RBC AUTO: 79 FL (ref 78–100)
PLATELET # BLD AUTO: 298 10E9/L (ref 150–450)
POTASSIUM SERPL-SCNC: 3.8 MMOL/L (ref 3.4–5.3)
PROT SERPL-MCNC: 8.2 G/DL (ref 6.8–8.8)
PTH-INTACT SERPL-MCNC: 30 PG/ML (ref 12–72)
RBC # BLD AUTO: 5.35 10E12/L (ref 4.4–5.9)
SODIUM SERPL-SCNC: 138 MMOL/L (ref 133–144)
WBC # BLD AUTO: 7.3 10E9/L (ref 4–11)

## 2018-02-07 NOTE — PATIENT INSTRUCTIONS
"GOALS:  Relating To Eating:  Eat slowly (20-30 minutes per meal), chewing foods well (25 chews per bite/applesauce consistency)  9\" Plate method (1/2 plate non-starchy vegetables/fruit, 1/4 plate lean protein, 1/4 plate whole grain starch - no more than 1/2 cup carb/meal)  Avoid snacking - pre plan snacks if needed, try to limit to once per day    Relating to beverages:  Reduce caffeine/carbonation/calorie containing beverages  Separate fluids from meals by 30 minutes before, during, and after eating    Relating to dietary supplements:  Continue multivitamin containing iron daily    Relating to activity:  Increase activity level.  Exercise 5 days/week for 1 mile per day of 7 laps    Raissa Martines, SENG, LD    If you need to schedule or reschedule with a dietitian please call [phone].    "

## 2018-02-07 NOTE — PROGRESS NOTES
"New Bariatric Surgery Consultation Note    RE: Jose J Marley  MR#: [id number]  : 1970      Referring provider:       2018   Who referred you? KRISTINE BOYD       Chief Complaint/Reason for visit: evaluation for possible weight loss surgery    Dear KRISTINE Boyd MD (General),    I had the pleasure of seeing your patient, Jose J Marley, to evaluate his obesity and consider him for possible weight loss surgery. As you know, Jose J Marley is 47 year old.  He has a height of 5' 5.5\", a weight of 255 lbs 11.2 oz, and calculated Body mass index is 41.9 kg/(m^2).    HISTORY OF PRESENT ILLNESS:  Weight Loss History Reviewed with Patient 2018   How long have you been overweight? Since late teens through early 20's   What is the most weight you have lost? 10   I have tried the following methods to lose weight Exercise, Atkins type diet (low carb/high protein), Prescription Medications   I have tried the following weight loss medications? (Check all that apply) Xenical/Orlistat/Chris   Have you ever had weight loss surgery? No       CO-MORBIDITIES OF OBESITY INCLUDE:     2018   I have the following co-morbidities associated with obesity: Pre-Diabetes, High Blood Pressure, High Cholesterol, Sleep Apnea   Do you use a CPAP? Yes   A1C 6.5 recently  On 2 HTN meds  On 2 meds for High cholesterol  ZEB uses CPAP for the last 1 year    PAST MEDICAL HISTORY:  Past Medical History:   Diagnosis Date     Obesity, unspecified 2011     Pure hypercholesterolemia 2008     Unspecified essential hypertension 2006       PAST SURGICAL HISTORY:  Past Surgical History:   Procedure Laterality Date     EXCISE MASS FINGER Right 2016    Procedure: EXCISE MASS FINGER;  Surgeon: Bimal Mitchell MD;  Location: [facility]     HERNIA REPAIR      umbilical     HERNIA REPAIR      inguinal       FAMILY HISTORY:   Family History   Problem Relation Age of Onset     C.A.D. 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 Yes   Who do you have in your support network that can be available to help you for the first 2 weeks after surgery? spouse parents grandparents   Who can you count on for support throughout your weight loss surgery journey? spouse parents grandparnent   Can you afford 3 meals a day?  Yes   Can you afford 50-60 dollars a month for vitamins? Yes   2 children ages 20 and 9    HABITS:     2/7/2018   How often do you drink alcohol? 2-4 times a month   If you do drink alcohol, how many drinks might you have in a day? (one drink = 5 oz. wine, 1 can/bottle of beer, 1 shot liquor) 3 or 4   Have you ever used any of the following nicotine products? No   Have you or are you currently using street drugs or prescription strength medication for which you do not have a prescription for? No   Do you have a history of chemical dependency (alcohol or drug abuse)? No       PSYCHOLOGICAL HISTORY:   Psychological History Reviewed With Patient 2/7/2018   Have you ever attempted suicide? Never.   Have you had thoughts of suicide in the past year? No   Have you ever been hospitalized for mental illness or a suicide attempt? Never.   Do you have a history of chronic pain? No   Have you ever been diagnosed with fibromyalgia? No   Are you currently seeing a therapist or counselor?  No   Are you currently seeing a psychiatrist? No   Hx of anxiety 10-15 years ago    ROS:     2/7/2018   Skin:  None of the above   HEENT: Headaches   Musculoskeletal: None of the above   Cardiovascular: None of the above   Pulmonary: None of the above  "  Gastrointestinal: Constipation   Genitourinary: None of the above   Hematological: None of the above   Neurological: None of the above       EATING BEHAVIORS:     2/7/2018   Have you or anyone else thought that you had an eating disorder? No   Do you currently binge eat (eat a large amount of food in a short time)? Yes   Are you an emotional eater? Yes   Do you get up to eat after falling asleep? No       EXERCISE:     2/7/2018   How often do you exercise? Less than 1 time per week   What is the duration of your exercise (in minutes)? 15 Minutes   What types of exercise do you do? walking   What keeps you from being more active?  Lack of Time, Too tired       MEDICATIONS:  Current Outpatient Prescriptions   Medication     gemfibrozil (LOPID) 600 MG tablet     metoprolol succinate (TOPROL-XL) 100 MG 24 hr tablet     lisinopril (PRINIVIL/ZESTRIL) 10 MG tablet     atorvastatin (LIPITOR) 10 MG tablet     IBUPROFEN PO     mupirocin (BACTROBAN) 2 % ointment     aspirin 81 MG tablet     fish oil-omega-3 fatty acids (FISH OIL) 1000 MG capsule     GARLIC     Multiple Vitamins-Minerals (MULTIVITAMIN OR)     No current facility-administered medications for this visit.        ALLERGIES:  No Known Allergies    LABS/IMAGING/MEDICAL RECORDS REVIEW:     PHYSICAL EXAM:  Temp 98.6  F (37  C) (Oral)  Ht 5' 5.5\"  Wt 255 lb 11.2 oz  SpO2 96%  BMI 41.9 kg/m2  General: NAD  Neurologic: A & O x 3, gait normal  Head: normocephalic, atraumatic  HEENT: PERRL, EOMI.   Respiratory: respirations unlabored  Abdomen: Obese, Soft NT ND   Extremities: No LE swelling   Skin: warm and dry.  No rashes on exposed skin  Psychiatric: Mentation and Affect appear normal      In summary, Jose J Marley has Class III obesity with a body mass index of Body mass index is 41.9 kg/(m^2). kg/m2 and the comorbidities stated above. He completed an informational seminar and is a candidate for the laparoscopic gastric sleeve.  He will have to complete the " "following pre-requisites:    See dietitian in March in [place] and call Davon Whitt [phone] to schedule with Dr Sandy and dietitian for April after psych eval done  Labs today or at any  clinic  Bariatric Task List  Status:  Is patient a candidate for bariatric surgery?:  Yes -     Status:  surgery evaluation in process -     Surgeon: Dr Sandy -     Tentative surgery month/year: May 2018 -        Insurance: Insurance:  [employer] -        Patient Info: Initial Weight:  255 lbs 11.2 oz -     Date of Initial Weight/Height:  2/7/2018 -     Goal Weight (lbs):  245 -     Required Weight Loss:  10 -     Surgery Type:  sleeve gastrectomy -        Dietician Visits: Structured weight loss required by insurance?:  Yes -     Dietician Visit 1:  Completed -     Dietician Visit 2:  Needed -     Dietician Visit 3:  Needed -        Psychological Evaluation: Psych eval:  Needed -        Lab Work: Complete Blood Count:  Needed -     Comprehensive Metabolic Panel:  Needed -     Vitamin D:  Needed -     PTH:  Needed -        Consults/ Clearance: Sleep Medicine:  Needed -     Cardiac:  Needed -        PCP: Establish care with PCP:  Completed -     PCP letter of support:  Needed -        Patient Education:  Information Session:  Completed -     Given \"Making your decision\" handout?:  Yes -     Given support group information?:  Yes -     Attended support group?:    -     Support plan in place?:  Completed -     Research consents signed?:  Yes -        Final Tasks:  Before surgery online class:  Needed -     Before surgery online class website link:  [email or web address]   After surgery online class:  Needed -     After surgery online class website link:  [email or web address]   Nurse visit for weigh-in and information:  Needed -     Pre-assessment clinic visit with anesthesia team for H&P:  Needed -     Final labs (Hgb, plt, T&S, UA):  Needed -        Notes:   -       Today in the office we discussed " gastric sleeve surgery. Preoperative, perioperative, and postoperative processes, management, and follow up were addressed.  Risks and benefits were outlined including the risk of death, staple line leak (1-2%), PE, DVT, ulcer, worsening GERD, N/V, stricture, hernia, wound infection, weight regain, and vitamin deficiencies. I emphasized exercise and activity along with appropriate food choice as the main foundation for weight loss with surgery providing surgical reinforcement of this.  All questions were answered.  A goal sheet and support group handout were given to the patient.    Once the patient has completed the requirements in their task list and there are no further recommendations, the pt will be allowed to see the surgeon of her choice for consultation on the laparoscopic gastric sleeve surgery. Patient verbalizes understanding of the process to surgery and expectations for the postoperative period including the need for lifelong lifestyle changes, vitamin supplementation, and laboratory monitoring.     Sincerely,    [place]ia Haskins PA-C    I spent a total of 30 minutes face to face with Jose J during today's office visit. Over 50% of this time was spent counseling the patient and/or coordinating care.

## 2018-02-07 NOTE — LETTER
February 12, 2018      TO: Jose J Marley  [address] [place] [address]         Dear Mr. Jose J Marley,    We received and reviewed your test results.  You may receive more than one letter if we receive the results on multiple days.  Please share all lab and test results with your primary care provider and keep a copy for your own records.        Your test results are normal.    If you have any questions, feel free contact us at the Call Center [phone].      Resulted Orders   CBC with platelets   Result Value Ref Range    WBC 7.3 4.0 - 11.0 10e9/L    RBC Count 5.35 4.4 - 5.9 10e12/L    Hemoglobin 13.4 13.3 - 17.7 g/dL    Hematocrit 42.1 40.0 - 53.0 %    MCV 79 78 - 100 fl    MCH 25.0 (L) 26.5 - 33.0 pg    MCHC 31.8 31.5 - 36.5 g/dL    RDW 14.7 10.0 - 15.0 %    Platelet Count 298 150 - 450 10e9/L   Comprehensive metabolic panel   Result Value Ref Range    Sodium 138 133 - 144 mmol/L    Potassium 3.8 3.4 - 5.3 mmol/L    Chloride 105 94 - 109 mmol/L    Carbon Dioxide 26 20 - 32 mmol/L    Anion Gap 7 3 - 14 mmol/L    Glucose 103 (H) 70 - 99 mg/dL    Urea Nitrogen 19 7 - 30 mg/dL    Creatinine 0.78 0.66 - 1.25 mg/dL    GFR Estimate >90 >60 mL/min/1.7m2      Comment:      Non  GFR Calc    GFR Estimate If Black >90 >60 mL/min/1.7m2      Comment:       GFR Calc    Calcium 9.2 8.5 - 10.1 mg/dL    Bilirubin Total 0.3 0.2 - 1.3 mg/dL    Albumin 4.1 3.4 - 5.0 g/dL    Protein Total 8.2 6.8 - 8.8 g/dL    Alkaline Phosphatase 78 40 - 150 U/L    ALT 31 0 - 70 U/L    AST 15 0 - 45 U/L   Parathyroid Hormone Intact   Result Value Ref Range    Parathyroid Hormone Intact 30 12 - 72 pg/mL   Vitamin D Deficiency   Result Value Ref Range    Vitamin D Deficiency screening 27 20 - 75 ug/L      Comment:      Season, race, dietary intake, and treatment affect the concentration of   25-hydroxy-Vitamin D. Values may decrease during winter months and increase   during summer months. Values  20-29 ug/L may indicate Vitamin D insufficiency   and values <20 ug/L may indicate Vitamin D deficiency.  Vitamin D determination is routinely performed by an immunoassay specific for   25 hydroxyvitamin D3.  If an individual is on vitamin D2 (ergocalciferol)   supplementation, please specify 25 OH vitamin D2 and D3 level determination by   LCMSMS test [id number].           Sincerely,      Albania Haskins PA-C

## 2018-02-07 NOTE — PATIENT INSTRUCTIONS
"See dietitian in March in [place] and call Davon Whitt [phone] to schedule with Dr Sandy and dietitian for April after psych eval done  Labs today or at any  clinic  Bariatric Task List  Status:  Is patient a candidate for bariatric surgery?:  Yes -     Status:  surgery evaluation in process -     Surgeon: Dr Sandy -     Tentative surgery month/year: May 2018 -        Insurance: Insurance:  [employer] -        Patient Info: Initial Weight:  255 lbs 11.2 oz -     Date of Initial Weight/Height:  2/7/2018 -     Goal Weight (lbs):  245 -     Required Weight Loss:  10 -     Surgery Type:  sleeve gastrectomy -        Dietician Visits: Structured weight loss required by insurance?:  Yes -     Dietician Visit 1:  Completed -     Dietician Visit 2:  Needed -     Dietician Visit 3:  Needed -        Psychological Evaluation: Psych eval:  Needed -        Lab Work: Complete Blood Count:  Needed -     Comprehensive Metabolic Panel:  Needed -     Vitamin D:  Needed -     PTH:  Needed -        Consults/ Clearance: Sleep Medicine:  Needed -     Cardiac:  Needed -        PCP: Establish care with PCP:  Completed -     PCP letter of support:  Needed -        Patient Education:  Information Session:  Completed -     Given \"Making your decision\" handout?:  Yes -     Given support group information?:  Yes -     Attended support group?:    -     Support plan in place?:  Completed -     Research consents signed?:  Yes -        Final Tasks:  Before surgery online class:  Needed -     Before surgery online class website link:  [email or web address]   After surgery online class:  Needed -     After surgery online class website link:  [email or web address]   Nurse visit for weigh-in and information:  Needed -     Pre-assessment clinic visit with anesthesia team for H&P:  Needed -     Final labs (Hgb, plt, T&S, UA):  Needed -        Notes:   -       "

## 2018-02-07 NOTE — PROGRESS NOTES
"New Bariatric Nutrition Consultation Note    Reason For Visit: Nutrition Assessment    Jose J Marley is a 47 year old presenting today for new bariatric nutrition consult.   Pt is interested in laparoscopic sleeve gastrectomy with Dr. Sandy expected surgery in May.  This is pt's first of 3 required nutrition visits prior to surgery(Pt plans to have second visit closer to home). Pt referred by Albania GARNER.     He is interested in having weight loss surgery for the following reasons:  Improve health  Patient reported his wife had a gastric bypass    Support System Reviewed With Patient 2/7/2018   Who do you have in your support network that can be available to help you for the first 2 weeks after surgery? spouse parents grandparents   Who can you count on for support throughout your weight loss surgery journey? spouse parents grandparnent       ANTHROPOMETRICS:  Estimated body mass index is 41.9 kg/(m^2) as calculated from the following:    Height as of an earlier encounter on 2/7/18: 1.664 m (5' 5.5\").    Weight as of an earlier encounter on 2/7/18: 116 kg (255 lb 11.2 oz).    Required weight loss goal pre-op: 10 lbs from initial consult weight (goal weight 245 lbs or less before surgery)       2/7/2018   I have tried the following methods to lose weight Exercise, Atkins type diet (low carb/high protein), Prescription Medications       Weight Loss Questions Reviewed With Patient 2/7/2018   How long have you been overweight? Since late teens through early 20's       SUPPLEMENT INFORMATION:  Multivitamin, fish oil    NUTRITION HISTORY:  Recall Diet Questions Reviewed With Patient 2/7/2018   Describe what you typically consume for breakfast (typical or most recent): instant oatmeal or protein shake   Describe what you typically consume for lunch (typical or most recent): leftovers from night before   Describe what you typically consume for supper (typical or most recent): spaghetti potatoes meatloaf hot dogs " hamburger   Describe what you typically consume as snacks (typical or most recent): cheese stick, chips, beef jerky, candy   How many ounces of water, or other low calorie drinks, do you drink daily (8 oz=1 glass)? 16 oz   How many ounces of caffeine (coffee, tea, pop) do you drink daily (8 oz=1 glass)? 16 oz (diet coke)   How many ounces of carbonated (pop, beer, sparkling water) drinks do you drinky daily (8 oz=1 glass)? 24 oz diet coke   How many ounces of juice, pop, sweet tea, sports drinks, protein drinks, other sweetened drinks, do you drink daily (8 oz=1 glass)? 0   How often do you drink alcohol? 2-4 times a month   If you do drink alcohol, how many drinks might you have in a day? (one drink = 5 oz. wine, 1 can/bottle of beer, 1 shot liquor) 3 or 4   Stopped drinking milk six months ago  [place] milk 8 oz daily    Eating Habits 2/7/2018   Do you currently binge eat (eat a large amount of food in a short time)? Yes   Are you an emotional eater? Yes   Do you get up to eat after falling asleep? No   What foods do you crave? pasta potatoes sugarystuff like candy       ADDITIONAL INFORMATION:    Dining Out History Reviewed With Patient 2/7/2018   How often do you dine out? A couple of times a week.   Where do you dine out? (select all that apply) sit-down restaurants, fast food chains   What types of food do you order when you dine out? pasta mexican Cambodian fres hamburgers       Physical Activity Reviewed With Patient 2/7/2018   How often do you exercise? Less than 1 time per week   What is the duration of your exercise (in minutes)? 15 Minutes   What types of exercise do you do? walking   What keeps you from being more active?  Lack of Time, Too tired   walking laps around the building at work 3-4 laps = 1/2 mile    NUTRITION DIAGNOSIS:  Obesity r/t long history of self-monitoring deficit and excessive energy intake aeb BMI >30.    INTERVENTION:  Intervention Provided/Education Provided on post-op diet  "guidelines, vitamins/minerals essential post-operatively, GI anatomy of bariatric surgeries, ways to help prepare for post-op diet guidelines pre-operatively, portion/calorie-control, mindful eating, sources of protein and 100 calorie snack list.  Provided pt with list of goals RD contact information.      Questions Reviewed With Patient 2/7/2018   How ready are you to make changes regarding your weight? Number 1 = Not ready at all to make changes up to 10 = very ready. 10   How confident are you that you can change? 1 = Not confident that you will be successful making changes up to 10 = very confident. 8       Patient Understanding: good  Expected Compliance: good    GOALS:  Relating To Eating:  Eat slowly (20-30 minutes per meal), chewing foods well (25 chews per bite/applesauce consistency)  9\" Plate method (1/2 plate non-starchy vegetables/fruit, 1/4 plate lean protein, 1/4 plate whole grain starch - no more than 1/2 cup carb/meal)  Avoid snacking - pre plan snacks if needed, try to limit to once per day    Relating to beverages:  Reduce caffeine/carbonation/calorie containing beverages  Separate fluids from meals by 30 minutes before, during, and after eating    Relating to dietary supplements:  Continue multivitamin containing iron daily    Relating to activity:  Increase activity level.  Exercise 5 days/week for 1 mile per day of 7 laps      Time spent with patient: 45 minutes.  Raissa Martines, RD, LD    "

## 2018-02-07 NOTE — NURSING NOTE
"(   Chief Complaint   Patient presents with     Consult     NBS, BMI 40.8    )    ( Weight: 255 lb 11.2 oz )  ( Height: 5' 5.5\" )  ( BMI (Calculated): 41.99 )  ( Initial Weight: 255 lb 11.2 oz )  ( Cumulative weight loss (lbs): 0 )  (   )  (   )  ( Waist Circumference (cm): 129 cm )  (   )    ( BP: (!) 149/96 )  (   )  ( Temp: 98.6  F (37  C) )  ( Temp src: Oral )  ( Pulse: 67 )  (   )  ( SpO2: 96 % )    (   Patient Active Problem List   Diagnosis     Preventative health care     Essential hypertension     Pure hypercholesterolemia     ZEB (obstructive sleep apnea)     Morbid obesity (H)     ACP (advance care planning)    )  (   Current Outpatient Prescriptions   Medication Sig Dispense Refill     gemfibrozil (LOPID) 600 MG tablet Take 1 tablet by mouth two  times daily 180 tablet 3     metoprolol succinate (TOPROL-XL) 100 MG 24 hr tablet TAKE 1 TABLET BY MOUTH  DAILY 90 tablet 3     lisinopril (PRINIVIL/ZESTRIL) 10 MG tablet TAKE 1 TABLET BY MOUTH  DAILY 90 tablet 3     atorvastatin (LIPITOR) 10 MG tablet Take 1 tablet by mouth  daily 90 tablet 3     IBUPROFEN PO Take 800 mg by mouth every 6 hours as needed for moderate pain       mupirocin (BACTROBAN) 2 % ointment Apply topically 2 times daily 15 g 0     aspirin 81 MG tablet Take  by mouth daily.       fish oil-omega-3 fatty acids (FISH OIL) 1000 MG capsule Take 1 g by mouth 2 times daily.       GARLIC daily.       Multiple Vitamins-Minerals (MULTIVITAMIN OR) Take  by mouth daily.      )  ( Diabetes Eval:    )    ( Pain Eval:  Data Unavailable )    ( Wound Eval:       )    (   History   Smoking Status     Never Smoker   Smokeless Tobacco     Never Used    )    ( Signed By:  Juan Luis Hopkins; February 7, 2018; 1:29 PM )    "

## 2018-02-07 NOTE — LETTER
"2018       RE: Jose J Marley  [address] [place] [address]     Dear Colleague,    Thank you for referring your patient, Jose J Marley, to the [facility] SURGICAL WEIGHT MANAGEMENT at [facility]. Please see a copy of my visit note below.    New Bariatric Surgery Consultation Note    RE: Jose J Marley  MR#: [id number]  : 1970      Referring provider:       2018   Who referred you? KRISTINE BOYD       Chief Complaint/Reason for visit: evaluation for possible weight loss surgery    Dear KRISTINE Boyd MD (General),    I had the pleasure of seeing your patient, Jose J Marley, to evaluate his obesity and consider him for possible weight loss surgery. As you know, Jose J Marley is 47 year old.  He has a height of 5' 5.5\", a weight of 255 lbs 11.2 oz, and calculated Body mass index is 41.9 kg/(m^2).    HISTORY OF PRESENT ILLNESS:  Weight Loss History Reviewed with Patient 2018   How long have you been overweight? Since late teens through early 20's   What is the most weight you have lost? 10   I have tried the following methods to lose weight Exercise, Atkins type diet (low carb/high protein), Prescription Medications   I have tried the following weight loss medications? (Check all that apply) Xenical/Orlistat/Chris   Have you ever had weight loss surgery? No       CO-MORBIDITIES OF OBESITY INCLUDE:     2018   I have the following co-morbidities associated with obesity: Pre-Diabetes, High Blood Pressure, High Cholesterol, Sleep Apnea   Do you use a CPAP? Yes   A1C 6.5 recently  On 2 HTN meds  On 2 meds for High cholesterol  ZEB uses CPAP for the last 1 year    PAST MEDICAL HISTORY:  Past Medical History:   Diagnosis Date     Obesity, unspecified 2011     Pure hypercholesterolemia 2008     Unspecified essential hypertension 2006       PAST SURGICAL HISTORY:  Past Surgical History:   Procedure Laterality Date     EXCISE " MASS FINGER Right 4/21/2016    Procedure: EXCISE MASS FINGER;  Surgeon: Bimal Mitchell MD;  Location: [facility]     HERNIA REPAIR  2011    umbilical     HERNIA REPAIR  1972    inguinal       FAMILY HISTORY:   Family History   Problem Relation Age of Onset     C.A.D. 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 Yes   Who do you have in your support network that can be available to help you for the first 2 weeks after surgery? spouse parents grandparents   Who can you count on for support throughout your weight loss surgery journey? spouse parents grandparnent   Can you afford 3 meals a day?  Yes   Can you afford 50-60 dollars a month for vitamins? Yes   2 children ages 20 and 9    HABITS:     2/7/2018   How often do you drink alcohol? 2-4 times a month   If you do drink alcohol, how many drinks might you have in a day? (one drink = 5 oz. wine, 1 can/bottle of beer, 1 shot liquor) 3 or 4   Have you ever used any of the following nicotine products? No   Have you or are you currently using street drugs or prescription strength medication for which you do not have a prescription for? No   Do you have a history of chemical dependency (alcohol or drug abuse)? No       PSYCHOLOGICAL HISTORY:   Psychological History Reviewed With Patient 2/7/2018   Have you ever attempted suicide? Never.   Have you had thoughts of suicide in the past year? No   Have you ever been hospitalized for mental illness or a suicide attempt? Never.   Do you have a history of chronic pain? No   Have you ever been diagnosed with fibromyalgia? No   Are you currently seeing a  "therapist or counselor?  No   Are you currently seeing a psychiatrist? No   Hx of anxiety 10-15 years ago    ROS:     2/7/2018   Skin:  None of the above   HEENT: Headaches   Musculoskeletal: None of the above   Cardiovascular: None of the above   Pulmonary: None of the above   Gastrointestinal: Constipation   Genitourinary: None of the above   Hematological: None of the above   Neurological: None of the above       EATING BEHAVIORS:     2/7/2018   Have you or anyone else thought that you had an eating disorder? No   Do you currently binge eat (eat a large amount of food in a short time)? Yes   Are you an emotional eater? Yes   Do you get up to eat after falling asleep? No       EXERCISE:     2/7/2018   How often do you exercise? Less than 1 time per week   What is the duration of your exercise (in minutes)? 15 Minutes   What types of exercise do you do? walking   What keeps you from being more active?  Lack of Time, Too tired       MEDICATIONS:  Current Outpatient Prescriptions   Medication     gemfibrozil (LOPID) 600 MG tablet     metoprolol succinate (TOPROL-XL) 100 MG 24 hr tablet     lisinopril (PRINIVIL/ZESTRIL) 10 MG tablet     atorvastatin (LIPITOR) 10 MG tablet     IBUPROFEN PO     mupirocin (BACTROBAN) 2 % ointment     aspirin 81 MG tablet     fish oil-omega-3 fatty acids (FISH OIL) 1000 MG capsule     GARLIC     Multiple Vitamins-Minerals (MULTIVITAMIN OR)     No current facility-administered medications for this visit.        ALLERGIES:  No Known Allergies    LABS/IMAGING/MEDICAL RECORDS REVIEW:     PHYSICAL EXAM:  Temp 98.6  F (37  C) (Oral)  Ht 5' 5.5\"  Wt 255 lb 11.2 oz  SpO2 96%  BMI 41.9 kg/m2  General: NAD  Neurologic: A & O x 3, gait normal  Head: normocephalic, atraumatic  HEENT: PERRL, EOMI.   Respiratory: respirations unlabored  Abdomen: Obese, Soft NT ND   Extremities: No LE swelling   Skin: warm and dry.  No rashes on exposed skin  Psychiatric: Mentation and Affect appear normal      In " "summary, Jose J Marley has Class III obesity with a body mass index of Body mass index is 41.9 kg/(m^2). kg/m2 and the comorbidities stated above. He completed an informational seminar and is a candidate for the laparoscopic gastric sleeve.  He will have to complete the following pre-requisites:    See dietitian in March in [place] and call Davon Whitt [phone] to schedule with Dr Sandy and dietitian for April after psych eval done  Labs today or at any  clinic  Bariatric Task List  Status:  Is patient a candidate for bariatric surgery?:  Yes -     Status:  surgery evaluation in process -     Surgeon: Dr Sandy -     Tentative surgery month/year: May 2018 -        Insurance: Insurance:  [employer] -        Patient Info: Initial Weight:  255 lbs 11.2 oz -     Date of Initial Weight/Height:  2/7/2018 -     Goal Weight (lbs):  245 -     Required Weight Loss:  10 -     Surgery Type:  sleeve gastrectomy -        Dietician Visits: Structured weight loss required by insurance?:  Yes -     Dietician Visit 1:  Completed -     Dietician Visit 2:  Needed -     Dietician Visit 3:  Needed -        Psychological Evaluation: Psych eval:  Needed -        Lab Work: Complete Blood Count:  Needed -     Comprehensive Metabolic Panel:  Needed -     Vitamin D:  Needed -     PTH:  Needed -        Consults/ Clearance: Sleep Medicine:  Needed -     Cardiac:  Needed -        PCP: Establish care with PCP:  Completed -     PCP letter of support:  Needed -        Patient Education:  Information Session:  Completed -     Given \"Making your decision\" handout?:  Yes -     Given support group information?:  Yes -     Attended support group?:    -     Support plan in place?:  Completed -     Research consents signed?:  Yes -        Final Tasks:  Before surgery online class:  Needed -     Before surgery online class website link:  [email or web address]   After surgery online class:  Needed -     After surgery online class " website link:  [email or web address]   Nurse visit for weigh-in and information:  Needed -     Pre-assessment clinic visit with anesthesia team for H&P:  Needed -     Final labs (Hgb, plt, T&S, UA):  Needed -        Notes:   -       Today in the office we discussed gastric sleeve surgery. Preoperative, perioperative, and postoperative processes, management, and follow up were addressed.  Risks and benefits were outlined including the risk of death, staple line leak (1-2%), PE, DVT, ulcer, worsening GERD, N/V, stricture, hernia, wound infection, weight regain, and vitamin deficiencies. I emphasized exercise and activity along with appropriate food choice as the main foundation for weight loss with surgery providing surgical reinforcement of this.  All questions were answered.  A goal sheet and support group handout were given to the patient.    Once the patient has completed the requirements in their task list and there are no further recommendations, the pt will be allowed to see the surgeon of her choice for consultation on the laparoscopic gastric sleeve surgery. Patient verbalizes understanding of the process to surgery and expectations for the postoperative period including the need for lifelong lifestyle changes, vitamin supplementation, and laboratory monitoring.     I spent a total of 30 minutes face to face with Jose J during today's office visit. Over 50% of this time was spent counseling the patient and/or coordinating care.    Sincerely,    [place] Haskins PA-C

## 2018-02-07 NOTE — MR AVS SNAPSHOT
"                  MRN:[id number]                      After Visit Summary   2/7/2018    Jose J Marley    MRN: [id number]           Visit Information        Provider Department      2/7/2018 11:30 AM Raissa Martines RD [facility]        Care Instructions    GOALS:  Relating To Eating:  Eat slowly (20-30 minutes per meal), chewing foods well (25 chews per bite/applesauce consistency)  9\" Plate method (1/2 plate non-starchy vegetables/fruit, 1/4 plate lean protein, 1/4 plate whole grain starch - no more than 1/2 cup carb/meal)  Avoid snacking - pre plan snacks if needed, try to limit to once per day    Relating to beverages:  Reduce caffeine/carbonation/calorie containing beverages  Separate fluids from meals by 30 minutes before, during, and after eating    Relating to dietary supplements:  Continue multivitamin containing iron daily    Relating to activity:  Increase activity level.  Exercise 5 days/week for 1 mile per day of 7 laps    Raissa Martines RD, LD    If you need to schedule or reschedule with a dietitian please call [phone].           [employer] Information     [employer] gives you secure access to your electronic health record. If you see a primary care provider, you can also send messages to your care team and make appointments. If you have questions, please call your primary care clinic.  If you do not have a primary care provider, please call [phone] and they will assist you.      [employer] is an electronic gateway that provides easy, online access to your medical records. With [employer], you can request a clinic appointment, read your test results, renew a prescription or communicate with your care team.     To access your existing account, please contact your [facility] or call [phone] for assistance.        Care EveryWhere ID     This is your Care EveryWhere ID. This could be used by other organizations to access your [place] " medical records  [id number]        Equal Access to Services     SUKHJINDER ZAMAN : Zohreh Rider, tong cameron, campos alba, nidia kemp. [facility] [phone].    ATENCIÓN: Si habla español, tiene a paul disposición servicios gratuitos de asistencia lingüística. Llame al [phone].    We comply with applicable federal civil rights laws and [place] laws. We do not discriminate on the basis of race, color, national origin, age, disability, sex, sexual orientation, or gender identity.

## 2018-02-07 NOTE — MR AVS SNAPSHOT
"              After Visit Summary   2/7/2018    Jose J Marley    MRN: [id number]           Patient Information     Date Of Birth          1970        Visit Information        Provider Department      2/7/2018 12:00 PM Albania Haskins PA-C [facility]        Today's Diagnoses     Morbid obesity (H)    -  1      Care Instructions    See dietitian in March in [place] and call Davon Whitt [phone] to schedule with Dr Sandy and dietitian for April after psych eval done  Labs today or at any  clinic  Bariatric Task List  Status:  Is patient a candidate for bariatric surgery?:  Yes -     Status:  surgery evaluation in process -     Surgeon: Dr Sandy -     Tentative surgery month/year: May 2018 -        Insurance: Insurance:  [employer] -        Patient Info: Initial Weight:  255 lbs 11.2 oz -     Date of Initial Weight/Height:  2/7/2018 -     Goal Weight (lbs):  245 -     Required Weight Loss:  10 -     Surgery Type:  sleeve gastrectomy -        Dietician Visits: Structured weight loss required by insurance?:  Yes -     Dietician Visit 1:  Completed -     Dietician Visit 2:  Needed -     Dietician Visit 3:  Needed -        Psychological Evaluation: Psych eval:  Needed -        Lab Work: Complete Blood Count:  Needed -     Comprehensive Metabolic Panel:  Needed -     Vitamin D:  Needed -     PTH:  Needed -        Consults/ Clearance: Sleep Medicine:  Needed -     Cardiac:  Needed -        PCP: Establish care with PCP:  Completed -     PCP letter of support:  Needed -        Patient Education:  Information Session:  Completed -     Given \"Making your decision\" handout?:  Yes -     Given support group information?:  Yes -     Attended support group?:    -     Support plan in place?:  Completed -     Research consents signed?:  Yes -        Final Tasks:  Before surgery online class:  Needed -     Before surgery online class website link:  [email or web address] " "  After surgery online class:  Needed -     After surgery online class website link:  [email or web address]   Nurse visit for weigh-in and information:  Needed -     Pre-assessment clinic visit with anesthesia team for H&P:  Needed -     Final labs (Hgb, plt, T&S, UA):  Needed -        Notes:   -               Follow-ups after your visit        Who to contact     Please call your clinic at [phone] to:    Ask questions about your health    Make or cancel appointments    Discuss your medicines    Learn about your test results    Speak to your doctor   If you have compliments or concerns about an experience at your clinic, or if you wish to file a complaint, please contact [facility] Patient Relations at [phone] or email us at [email or web address]         Additional Information About Your Visit        [employer] Information     [employer] gives you secure access to your electronic health record. If you see a primary care provider, you can also send messages to your care team and make appointments. If you have questions, please call your primary care clinic.  If you do not have a primary care provider, please call [phone] and they will assist you.      [employer] is an electronic gateway that provides easy, online access to your medical records. With [employer], you can request a clinic appointment, read your test results, renew a prescription or communicate with your care team.     To access your existing account, please contact your [facility] or call [phone] for assistance.        Care EveryWhere ID     This is your Care EveryWhere ID. This could be used by other organizations to access your [place] medical records  [id number]        Your Vitals Were     Temperature Height Pulse Oximetry BMI (Body Mass Index)          98.6  F (37  C) (Oral) 5' 5.5\" 96% 41.9 kg/m2         Blood Pressure from Last 3 Encounters:   01/25/18 " 132/86   04/16/17 149/93   11/15/16 132/81    Weight from Last 3 Encounters:   02/07/18 255 lb 11.2 oz   01/25/18 253 lb   11/15/16 253 lb              We Performed the Following     CBC with platelets     Comprehensive metabolic panel     Parathyroid Hormone Intact     Vitamin D Deficiency        Primary Care Provider Office Phone # Fax #    R Arnold Crockett [phone]       [facility] [place] [address] [place] [address]  [place] [address]        Equal Access to Services     SUKHJINDER ZAMAN : Hadii aad ku hadasho Soomaali, waaxda luqadaha, qaybta kaalmada adeegyada, waxay idiin hayaan thuy correia . So [facility] [phone].    ATENCIÓN: Si habla español, tiene a paul disposición servicios gratuitos de asistencia lingüística. [facility] [phone].    We comply with applicable federal civil rights laws and [place] laws. We do not discriminate on the basis of race, color, national origin, age, disability, sex, sexual orientation, or gender identity.            Thank you!     Thank you for choosing [facility] SURGICAL WEIGHT MANAGEMENT  for your care. Our goal is always to provide you with excellent care. Hearing back from our patients is one way we can continue to improve our services. Please take a few minutes to complete the written survey that you may receive in the mail after your visit with us. Thank you!             Your Updated Medication List - Protect others around you: Learn how to safely use, store and throw away your medicines at www.disposemymeds.org.          This list is accurate as of 2/7/18 12:30 PM.  Always use your most recent med list.                   Brand Name Dispense Instructions for use Diagnosis    aspirin 81 MG tablet      Take  by mouth daily.        atorvastatin 10 MG tablet    LIPITOR    90 tablet    Take 1 tablet by mouth  daily    Pure hypercholesterolemia       fish oil-omega-3 fatty acids 1000 MG capsule      Take 1 g by mouth 2 times daily.        GARLIC       daily.        gemfibrozil 600 MG tablet    LOPID    180 tablet    Take 1 tablet by mouth two  times daily    Elevated triglycerides with high cholesterol       IBUPROFEN PO      Take 800 mg by mouth every 6 hours as needed for moderate pain        lisinopril 10 MG tablet    PRINIVIL/ZESTRIL    90 tablet    TAKE 1 TABLET BY MOUTH  DAILY    Essential hypertension       metoprolol succinate 100 MG 24 hr tablet    TOPROL-XL    90 tablet    TAKE 1 TABLET BY MOUTH  DAILY    Essential hypertension       MULTIVITAMIN PO      Take  by mouth daily.        mupirocin 2 % ointment    BACTROBAN    15 g    Apply topically 2 times daily

## 2018-02-08 ENCOUNTER — TELEPHONE (OUTPATIENT)
Dept: FAMILY MEDICINE | Facility: OTHER | Age: 48
End: 2018-02-08

## 2018-02-08 DIAGNOSIS — E66.01 MORBID OBESITY (H): ICD-10-CM

## 2018-02-08 DIAGNOSIS — E66.9 OBESITY: ICD-10-CM

## 2018-02-08 LAB — DEPRECATED CALCIDIOL+CALCIFEROL SERPL-MC: 27 UG/L (ref 20–75)

## 2018-02-10 ENCOUNTER — MEDICAL CORRESPONDENCE (OUTPATIENT)
Dept: HEALTH INFORMATION MANAGEMENT | Facility: CLINIC | Age: 48
End: 2018-02-10

## 2018-02-13 ENCOUNTER — TELEPHONE (OUTPATIENT)
Dept: SURGERY | Facility: CLINIC | Age: 48
End: 2018-02-13

## 2018-02-13 ENCOUNTER — CARE COORDINATION (OUTPATIENT)
Dept: SURGERY | Facility: CLINIC | Age: 48
End: 2018-02-13

## 2018-02-13 DIAGNOSIS — E66.01 MORBID OBESITY (H): Primary | ICD-10-CM

## 2018-02-13 NOTE — PROGRESS NOTES
Davon reviewed tasklist updates with client.  Bariatric Task List  Status:  Is patient a candidate for bariatric surgery?:  Yes -     Cleared to schedule surgeon consult?:    -     Status:  surgery evaluation in process -     Surgeon: Dr Sandy -     Tentative surgery month/year: May 2018 -        Insurance: Insurance:  [employer] -     Cigna: PCP Recommendation and Medical Clearance:    -     HP Referral:    -     Other:  Follows [facility] guidelines. Call [facility] at [phone]. -        Patient Info: Initial Weight:  255 lbs 11.2 oz -     Date of Initial Weight/Height:  2/7/2018 -     Goal Weight (lbs):  245 -     Required Weight Loss:  10 -     Surgery Type:  sleeve gastrectomy -     Multidisciplinary Meeting:    -        Dietician Visits: Structured weight loss required by insurance?:  Yes -     Dietician Visit 1:  Completed - 2/7/18    Dietician Visit 2:  Needed -     Dietician Visit 3:  Needed -     Dietician Visit 4:  Needed -     Dietician Visit 5:  Needed -     Dietician Visit 6:  Needed -     Dietician Visit additional:    -     Clearance from dietician to see surgeon?:    -     Dietician Notes:  Follows [facility] guidelines, will need 6 RD visits in 6 months. -        Psychological Evaluation: Psych eval:  Needed -     Therapist letter of support:    -     Psychiatrist letter of support:    -     Establish care with therapist:    -     Complete eating disorder evaluation:    -     Letter of clearance from therapist/eating disorder program:    -     Other:    -        Lab Work: Complete Blood Count:  Completed -     Comprehensive Metabolic Panel:  Completed -     Vitamin D:  Completed -     Hgb A1c:    -     PTH:  Completed -     H. pylori:    -     TSH:    -     Nicotine Testing:    -     Other:    -        Consults/ Clearance: Sleep Medicine:  Needed -     Cardiac:  Needed -     Pain:   -     Dental:    -     Endocrine:    -     Gastroenterology:    -     Vascular Medicine:    -     Hematology:    -  "    Medical Weight Management:   -     Physical Therapy/Exercise:    -     Nephrology:    -     Neurology:    -     Pulmonology:    -     Rheumatology:    -     Other    -     Other    -     Other    -           Testing: UGI:    -     EGD:    -     Other:    -        PCP: Establish care with PCP:  Completed -     Follow up with PCP:    -     PCP letter of support:  Completed - PCP ltr 12/13/18 Dr Sunny Crockett      Smoking: Quit tobacco use (3 months smoke free)?:    -     Quit date:    -        Patient Education:  Information Session:  Completed -     Given \"Making your decision\" handout?:  Yes -     Given support group information?:  Yes -     Attended support group?:    -     Support plan in place?:  Completed -     Research consents signed?:  Yes -        Additional Surgery Requirements: Review Coag plan:    -     HgA1c <8:    -     Inpatient pain consult:    -     Final nicotine screen:    -     Dental work complete:    -     Birth control plan:    -     Other Requirements:    -     Other Requirements:    -        Final Tasks:  Before surgery online class:  Needed -     Before surgery online class website link:  [email or web address]   After surgery online class:  Needed -     After surgery online class website link:  [email or web address]   Nurse visit for weigh-in and information:  Needed -     Pre-assessment clinic visit with anesthesia team for H&P:  Needed -     Final labs (Hgb, plt, T&S, UA):  Needed -        Notes:   -           "

## 2018-02-13 NOTE — TELEPHONE ENCOUNTER
Client requesting referral to be sent to [facility] for psych evaluation pre bariatric surgery. Fax: [phone]; ph: [phone]. To be sent.

## 2018-03-04 ENCOUNTER — HOSPITAL ENCOUNTER (EMERGENCY)
Facility: HOSPITAL | Age: 48
Discharge: HOME | End: 2018-03-04
Admitting: INTERNAL MEDICINE
Payer: COMMERCIAL

## 2018-03-04 VITALS
TEMPERATURE: 97.3 F | DIASTOLIC BLOOD PRESSURE: 93 MMHG | RESPIRATION RATE: 16 BRPM | OXYGEN SATURATION: 98 % | HEIGHT: 66 IN | SYSTOLIC BLOOD PRESSURE: 169 MMHG

## 2018-03-04 DIAGNOSIS — I10 ESSENTIAL HYPERTENSION: ICD-10-CM

## 2018-03-04 PROCEDURE — 99282 EMERGENCY DEPT VISIT SF MDM: CPT

## 2018-03-04 PROCEDURE — 99282 EMERGENCY DEPT VISIT SF MDM: CPT | Performed by: INTERNAL MEDICINE

## 2018-03-04 NOTE — ED AVS SNAPSHOT
[facility]    [address] [address]    Phone:  [phone]                                       Jose J Marley   MRN: [id number]    Department:  [facility]   Date of Visit:  3/4/2018           After Visit Summary Signature Page     I have received my discharge instructions, and my questions have been answered. I have discussed any challenges I see with this plan with the nurse or doctor.    ..........................................................................................................................................  Patient/Patient Representative Signature      ..........................................................................................................................................  Patient Representative Print Name and Relationship to Patient    ..................................................               ................................................  Date                                            Time    ..........................................................................................................................................  Reviewed by Signature/Title    ...................................................              ..............................................  Date                                                            Time

## 2018-03-04 NOTE — ED AVS SNAPSHOT
[facility]    [address] [address]    Phone:  [phone]                                       Jose J Marley   MRN: [id number]    Department:  [facility]   Date of Visit:  3/4/2018           Patient Information     Date Of Birth          1970        Your diagnoses for this visit were:     Essential hypertension       Follow-up Information     Schedule an appointment as soon as possible for a visit with KRISTINE Crockett MD.    Specialty:  Family Practice    Contact information:    [address] [place] [address]  [phone]          Discharge Instructions         Step-by-Step  Checking Your Blood Pressure    Date Last Reviewed: 4/27/2016 2000-2017 [employer]. [address] [place] [address], [place] [address]. All rights reserved. This information is not intended as a substitute for professional medical care. Always follow your healthcare professional's instructions.             Review of your medicines      Our records show that you are taking the medicines listed below. If these are incorrect, please call your family doctor or clinic.        Dose / Directions Last dose taken    aspirin 81 MG tablet        Take  by mouth daily.   Refills:  0        atorvastatin 10 MG tablet   Commonly known as:  LIPITOR   Quantity:  90 tablet        Take 1 tablet by mouth  daily   Refills:  3        fish oil-omega-3 fatty acids 1000 MG capsule   Dose:  1 g        Take 1 g by mouth 2 times daily.   Refills:  0        GARLIC        daily.   Refills:  0        gemfibrozil 600 MG tablet   Commonly known as:  LOPID   Quantity:  180 tablet        Take 1 tablet by mouth two  times daily   Refills:  3        IBUPROFEN PO   Dose:  800 mg        Take 800 mg by mouth every 6 hours as needed for moderate pain   Refills:  0        lisinopril 10 MG tablet   Commonly known as:  PRINIVIL/ZESTRIL   Quantity:  90 tablet        TAKE 1 TABLET BY MOUTH  DAILY   Refills:  3         metoprolol succinate 100 MG 24 hr tablet   Commonly known as:  TOPROL-XL   Quantity:  90 tablet        TAKE 1 TABLET BY MOUTH  DAILY   Refills:  3        MULTIVITAMIN PO        Take  by mouth daily.   Refills:  0                Orders Needing Specimen Collection     None      Pending Results     No orders found from 3/2/2018 to 3/5/2018.            Pending Culture Results     No orders found from 3/2/2018 to 3/5/2018.            Thank you for choosing [facility]       Thank you for choosing [facility] for your care. Our goal is always to provide you with excellent care. Hearing back from our patients is one way we can continue to improve our services. Please take a few minutes to complete the written survey that you may receive in the mail after you visit with us. Thank you!        [employer] Information     [employer] gives you secure access to your electronic health record. If you see a primary care provider, you can also send messages to your care team and make appointments. If you have questions, please call your primary care clinic.  If you do not have a primary care provider, please call [phone] and they will assist you.        Care EveryWhere ID     This is your Care EveryWhere ID. This could be used by other organizations to access your [facility] medical records  [id number]        Equal Access to Services     SUKHJINDER ZAMAN AH: Hadii victor m Rider, tong cameron, qapraveenta kaalmajahaira alba, nidia kemp. So [facility] [phone].    ATENCIÓN: Si habla español, tiene a paul disposición servicios gratuitos de asistencia lingüística. Llame al [phone].    We comply with applicable federal civil rights laws and [place] laws. We do not discriminate on the basis of race, color, national origin, age, disability, sex, sexual orientation, or gender identity.            After Visit Summary       This is your record. Keep this with you and show to your community pharmacist(s) and  doctor(s) at your next visit.

## 2018-03-05 NOTE — DISCHARGE INSTRUCTIONS
Step-by-Step  Checking Your Blood Pressure    Date Last Reviewed: 4/27/2016 2000-2017 [employer]. [address] [facility], [place] [address]. All rights reserved. This information is not intended as a substitute for professional medical care. Always follow your healthcare professional's instructions.

## 2018-03-05 NOTE — ED NOTES
"In ED for \" high blood pressure at home of 183/91 and 204/91 usually systollic blood pressure in the 160s and diastolic BP in the low 80s.\"  "

## 2018-03-06 ASSESSMENT — ENCOUNTER SYMPTOMS
VOMITING: 0
CHILLS: 0
ABDOMINAL PAIN: 0
CONFUSION: 0
ABDOMINAL DISTENTION: 0
SLEEP DISTURBANCE: 0
FREQUENCY: 0
BACK PAIN: 0
FEVER: 0
LIGHT-HEADEDNESS: 0
CHEST TIGHTNESS: 0
VOICE CHANGE: 0
HEADACHES: 0
ANAL BLEEDING: 0
PALPITATIONS: 0
MYALGIAS: 0
WEAKNESS: 0
WHEEZING: 0
COLOR CHANGE: 0
NECK PAIN: 0
COUGH: 0
NUMBNESS: 0
DIAPHORESIS: 0
DIZZINESS: 0
SHORTNESS OF BREATH: 0
DYSURIA: 0
FLANK PAIN: 0
BLOOD IN STOOL: 0
NAUSEA: 0

## 2018-03-07 NOTE — ED PROVIDER NOTES
History     Chief Complaint   Patient presents with     Hypertension     States last BP at home 204/91.     The history is provided by the patient.     Jose J Marley is a 47 year old male who came for uncontrolled BP and anxieous about it.     Problem List:    Patient Active Problem List    Diagnosis Date Noted     ACP (advance care planning) 11/15/2016     Priority: Medium     Advance Care Planning 11/15/2016: ACP Review of Chart / Resources Provided:  Reviewed chart for advance care plan.  Jose J Marley has no plan or code status on file however states presence of ACP document. Copy requested. Confirmed code status reflects current choices pending receipt of document/advance care plan review.  Confirmed/documented legally designated decision makers.  Added by TOMI MEANS             Morbid obesity (H) 12/02/2015     Priority: Medium     ZEB (obstructive sleep apnea) 11/30/2015     Priority: Medium     Preventative health care 04/04/2013     Priority: Medium     Pure hypercholesterolemia 08/04/2008     Priority: Medium     Essential hypertension 12/20/2006     Priority: Medium     Problem list name updated by automated process. Provider to review          Past Medical History:    Past Medical History:   Diagnosis Date     Obesity, unspecified 01/01/2011     Pure hypercholesterolemia 08/04/2008     Unspecified essential hypertension 12/20/2006       Past Surgical History:    Past Surgical History:   Procedure Laterality Date     EXCISE MASS FINGER Right 4/21/2016    Procedure: EXCISE MASS FINGER;  Surgeon: Bimal Mitchell MD;  Location: [facility]     HERNIA REPAIR  2011    umbilical     HERNIA REPAIR  1972    inguinal       Family History:    Family History   Problem Relation Age of Onset     C.A.D. Maternal Grandmother 67     cause of death     Hypertension Mother      Lipids Mother      hyperlipidemia       Social History:  Marital Status:   [2]  Social History   Substance Use Topics      "Smoking status: Never Smoker     Smokeless tobacco: Never Used     Alcohol use Yes      Comment: Rarely         Medications:      gemfibrozil (LOPID) 600 MG tablet   metoprolol succinate (TOPROL-XL) 100 MG 24 hr tablet   lisinopril (PRINIVIL/ZESTRIL) 10 MG tablet   atorvastatin (LIPITOR) 10 MG tablet   aspirin 81 MG tablet   fish oil-omega-3 fatty acids (FISH OIL) 1000 MG capsule   GARLIC   Multiple Vitamins-Minerals (MULTIVITAMIN OR)   IBUPROFEN PO         Review of Systems   Constitutional: Negative for chills, diaphoresis and fever.   HENT: Negative for voice change.    Eyes: Negative for visual disturbance.   Respiratory: Negative for cough, chest tightness, shortness of breath and wheezing.    Cardiovascular: Negative for chest pain, palpitations and leg swelling.   Gastrointestinal: Negative for abdominal distention, abdominal pain, anal bleeding, blood in stool, nausea and vomiting.   Genitourinary: Negative for decreased urine volume, dysuria, flank pain and frequency.   Musculoskeletal: Negative for back pain, gait problem, myalgias and neck pain.   Skin: Negative for color change, pallor and rash.   Neurological: Negative for dizziness, syncope, weakness, light-headedness, numbness and headaches.   Psychiatric/Behavioral: Negative for confusion, sleep disturbance and suicidal ideas.       Physical Exam   BP: (!) 181/95  Heart Rate: 63  Temp: 97.3  F (36.3  C)  Resp: 16  Height: 167.6 cm (5' 6\")  SpO2: 98 %      Physical Exam   Constitutional: He is oriented to person, place, and time. He appears well-developed and well-nourished.   HENT:   Head: Normocephalic and atraumatic.   Mouth/Throat: No oropharyngeal exudate.   Eyes: Conjunctivae are normal. Pupils are equal, round, and reactive to light.   Neck: Normal range of motion. Neck supple. No JVD present. No tracheal deviation present. No thyromegaly present.   Cardiovascular: Normal rate, regular rhythm, normal heart sounds and intact distal pulses.  Exam " reveals no gallop and no friction rub.    No murmur heard.  Pulmonary/Chest: Effort normal and breath sounds normal. No stridor. No respiratory distress. He has no wheezes. He has no rales. He exhibits no tenderness.   Abdominal: Soft. Bowel sounds are normal. He exhibits no distension and no mass. There is no tenderness. There is no rebound and no guarding.   Musculoskeletal: Normal range of motion. He exhibits no edema or tenderness.   Lymphadenopathy:     He has no cervical adenopathy.   Neurological: He is alert and oriented to person, place, and time.   Skin: Skin is warm and dry. No rash noted. No erythema. No pallor.   Psychiatric: His behavior is normal.   Nursing note and vitals reviewed.      ED Course     ED Course     Procedures                 Labs Ordered and Resulted from Time of ED Arrival Up to the Time of Departure from the ED - No data to display    Assessments & Plan (with Medical Decision Making)   Concerned about high BP otherwise asymptomatic  After reviewing patient recorded BPs in his cellphone application, average SBP over past week was about 160  I advsied increasing evening dose of lisinopril from 10 to 20 mg , continue recording his BP 3 times per days and follow up with PMD for optimal adjusetment of BP.   He understood and agreed  I have reviewed the nursing notes.    I have reviewed the findings, diagnosis, plan and need for follow up with the patient.      Discharge Medication List as of 3/4/2018 10:53 PM          Final diagnoses:   Essential hypertension       3/4/2018   [facility]     Gerry Schmidt MD  03/06/18 2045

## 2018-03-13 ENCOUNTER — TELEPHONE (OUTPATIENT)
Dept: FAMILY MEDICINE | Facility: OTHER | Age: 48
End: 2018-03-13

## 2018-03-13 ENCOUNTER — MEDICAL CORRESPONDENCE (OUTPATIENT)
Dept: HEALTH INFORMATION MANAGEMENT | Facility: CLINIC | Age: 48
End: 2018-03-13

## 2018-03-13 DIAGNOSIS — E66.01 MORBID OBESITY (H): Primary | ICD-10-CM

## 2018-03-15 ENCOUNTER — HOSPITAL ENCOUNTER (OUTPATIENT)
Dept: NUTRITION | Facility: HOSPITAL | Age: 48
Discharge: HOME | End: 2018-03-15
Attending: FAMILY MEDICINE | Admitting: FAMILY MEDICINE
Payer: COMMERCIAL

## 2018-03-15 PROCEDURE — 97802 MEDICAL NUTRITION INDIV IN: CPT | Performed by: DIETITIAN, REGISTERED

## 2018-03-16 VITALS — WEIGHT: 249.3 LBS | BODY MASS INDEX: 40.07 KG/M2 | HEIGHT: 66 IN

## 2018-03-16 NOTE — PROGRESS NOTES
"[facility]  Medical Nutrition Therapy    Visit Type: Initial Assessment    Jose J Marley referred for MNT related to Obesity      Nutrition Assessment:  Anthropometrics  Height: .  167.6 cm (5' 6\") BMI:    40.32   Weight:  113.1 kg (249 lb 4.8 oz) BSA:  2.29   IBW (kg):  Male: 64.4         Nutrition History  Pt is working towards bariatric surgery which requires 6 appointments. He saw a dietitian in the [facility] and reviewed My[employer]ate and healthy snacks. A requirement for the surgery is to lose 10 lbs. He has already started making healthy lifestyle changes such as watching his portions, snacking less, swapping ingredients for healthier options, and increasing his physical activity level. Currently eats 3 meals a day and may have a snack. He will go out to eat 1-2 x a week.     Food Record  Breakfast:  Premier protein drink with 1-2 eggs    Lunch:  Leftovers from dinner    Dinner:  Spaghetti  Meat loaf  Pork chops  Will have potatoes and/or veggies for sides    Snacks:  Beef jerky  Piece of lunch meat    Beverages:  2 cans diet coke a day  Water    Physical Activity- Recently increased daily activity level  Walking a mile at work  Elliptical 3-4 x a week for 30 mintutes      Nutrition Prescription  Energy:   9494-7435 kcal ([place] Jeor minus 500 kcal)      Protein:   55-70g (IBW x 0.8 -1.0)             Nutrition Diagnosis:   Obesity related to excess energy intake as evidenced by BMI 40.24.    Nutrition Intervention:  Educated pt on the following:  - weight loss tips  -portion size  -importance of physical activity to promote weight loss  - bariatric diet limitations after surgery    Nutrition Goals:  1. 30 minutes 5 days a week of physical activity  2. 1 can diet coke a day  3. 1 serving veggies a day  4. 1 lb weight loss per week    Nutrition Follow Up / Monitoring:   diet recall, weight, physical activity, labs    Nutrition Recommendations:   1500 kcal diet, focus on fruits, vegetables, " lean meats, low fat dariy and whole grains. Continue to increase physical activity level.    Patient to follow-up with RD in 1 month.  Patient has RD contact information to call/email if needed.

## 2018-03-27 ENCOUNTER — CARE COORDINATION (OUTPATIENT)
Dept: SURGERY | Facility: CLINIC | Age: 48
End: 2018-03-27

## 2018-03-27 NOTE — PROGRESS NOTES
"Tasklist updated and copy sent to client via [employer].    Bariatric Task List  Status:  Is patient a candidate for bariatric surgery?:  Yes -     Cleared to schedule surgeon consult?:    -     Status:  surgery evaluation in process -     Surgeon: Dr Sandy -     Tentative surgery month/year: August 2018 -        Insurance: Insurance:  [employer], follows [facility] guidelines -     Cigna: PCP Recommendation and Medical Clearance:    -     HP Referral:    -     Other:  Follows [facility] guidelines. Call [facility] at [phone]. -        Patient Info: Initial Weight:  255 lbs 11.2 oz -     Date of Initial Weight/Height:  2/7/2018 -     Goal Weight (lbs):  245 -     Required Weight Loss:  10 -     Surgery Type:  sleeve gastrectomy -        Dietician Visits: Structured weight loss required by insurance?:  Yes -     Dietician Visit 1:  Completed - 2/7/18 MV   Dietician Visit 2:  Completed - 3/15/18 done.   Dietician Visit 3:  Needed -     Dietician Visit 4:  Needed -     Dietician Visit 5:  Needed -     Dietician Visit 6:  Needed -     Dietician Visit additional:    -     Clearance from dietician to see surgeon?:    -     Dietician Notes:  Follows [facility] guidelines, will need 6 RD visits in 6 months. -        Psychological Evaluation: Psych eval:  Needed -        Lab Work: Complete Blood Count:  Completed -     Comprehensive Metabolic Panel:  Completed -     Vitamin D:  Completed -     PTH:  Completed -        Consults/ Clearance: Sleep Medicine:  Completed - 3/13 Donna Lr APRN, CNP-cleared   Cardiac:  Needed -        PCP: Establish care with PCP:  Completed -     Follow up with PCP:    -     PCP letter of support:  Completed - PCP ltr 12/13/18 Dr Sunny Crockett      Patient Education:  Information Session:  Completed -     Given \"Making your decision\" handout?:  Yes -     Given support group information?:  Yes -     Attended support group?:    -     Support plan in place?:  Completed -     Research consents signed?: "  Yes -        Final Tasks:  Before surgery online class:  Needed -     Before surgery online class website link:  [email or web address]   After surgery online class:  Needed -     After surgery online class website link:  [email or web address]   Nurse visit for weigh-in and information:  Needed -     Pre-assessment clinic visit with anesthesia team for H&P:  Needed -     Final labs (Hgb, plt, T&S, UA):  Needed -        Notes:   -

## 2018-03-28 ENCOUNTER — TELEPHONE (OUTPATIENT)
Dept: FAMILY MEDICINE | Facility: OTHER | Age: 48
End: 2018-03-28

## 2018-03-28 DIAGNOSIS — Z01.810 PRE-OPERATIVE CARDIOVASCULAR EXAMINATION: Primary | ICD-10-CM

## 2018-03-28 NOTE — TELEPHONE ENCOUNTER
Patient scheduled apt with Barby Modi for cardiac clearance for weight lose surgery. He was wondering if you could but in a referral for her?    Please let me know.    Thank you    (any questions you can call the patient)

## 2018-03-30 ENCOUNTER — CARE COORDINATION (OUTPATIENT)
Dept: SURGERY | Facility: CLINIC | Age: 48
End: 2018-03-30

## 2018-03-30 DIAGNOSIS — E66.01 MORBID OBESITY (H): Primary | ICD-10-CM

## 2018-04-03 ENCOUNTER — OFFICE VISIT (OUTPATIENT)
Dept: CARDIOLOGY | Facility: OTHER | Age: 48
End: 2018-04-03
Attending: NURSE PRACTITIONER
Payer: COMMERCIAL

## 2018-04-03 VITALS
BODY MASS INDEX: 40.98 KG/M2 | TEMPERATURE: 98.7 F | SYSTOLIC BLOOD PRESSURE: 138 MMHG | HEIGHT: 66 IN | OXYGEN SATURATION: 100 % | WEIGHT: 255 LBS | HEART RATE: 74 BPM | DIASTOLIC BLOOD PRESSURE: 86 MMHG | RESPIRATION RATE: 16 BRPM

## 2018-04-03 DIAGNOSIS — E66.01 MORBID OBESITY (H): ICD-10-CM

## 2018-04-03 DIAGNOSIS — R73.03 PRE-DIABETES: ICD-10-CM

## 2018-04-03 DIAGNOSIS — I10 ESSENTIAL HYPERTENSION: ICD-10-CM

## 2018-04-03 DIAGNOSIS — G47.33 OSA (OBSTRUCTIVE SLEEP APNEA): ICD-10-CM

## 2018-04-03 DIAGNOSIS — Z82.49 FAMILY HISTORY OF PREMATURE CAD: ICD-10-CM

## 2018-04-03 DIAGNOSIS — E78.2 MIXED HYPERLIPIDEMIA: ICD-10-CM

## 2018-04-03 DIAGNOSIS — Z01.810 PRE-OPERATIVE CARDIOVASCULAR EXAMINATION: Primary | ICD-10-CM

## 2018-04-03 LAB
ANION GAP SERPL CALCULATED.3IONS-SCNC: 5 MMOL/L (ref 3–14)
BUN SERPL-MCNC: 19 MG/DL (ref 7–30)
CALCIUM SERPL-MCNC: 8.9 MG/DL (ref 8.5–10.1)
CHLORIDE SERPL-SCNC: 107 MMOL/L (ref 94–109)
CO2 SERPL-SCNC: 25 MMOL/L (ref 20–32)
CREAT SERPL-MCNC: 0.8 MG/DL (ref 0.66–1.25)
GFR SERPL CREATININE-BSD FRML MDRD: >90 ML/MIN/1.7M2
GLUCOSE SERPL-MCNC: 105 MG/DL (ref 70–99)
MAGNESIUM SERPL-MCNC: 2.3 MG/DL (ref 1.6–2.3)
POTASSIUM SERPL-SCNC: 4.4 MMOL/L (ref 3.4–5.3)
SODIUM SERPL-SCNC: 137 MMOL/L (ref 133–144)

## 2018-04-03 PROCEDURE — 99214 OFFICE O/P EST MOD 30 MIN: CPT | Performed by: NURSE PRACTITIONER

## 2018-04-03 PROCEDURE — 83735 ASSAY OF MAGNESIUM: CPT | Performed by: NURSE PRACTITIONER

## 2018-04-03 PROCEDURE — 93000 ELECTROCARDIOGRAM COMPLETE: CPT | Performed by: INTERNAL MEDICINE

## 2018-04-03 PROCEDURE — 36415 COLL VENOUS BLD VENIPUNCTURE: CPT | Performed by: NURSE PRACTITIONER

## 2018-04-03 PROCEDURE — 80048 BASIC METABOLIC PNL TOTAL CA: CPT | Performed by: NURSE PRACTITIONER

## 2018-04-03 RX ORDER — LISINOPRIL 20 MG/1
20 TABLET ORAL 2 TIMES DAILY
Qty: 120 TABLET | Refills: 3 | Status: SHIPPED | OUTPATIENT
Start: 2018-04-03 | End: 2018-05-11

## 2018-04-03 ASSESSMENT — PAIN SCALES - GENERAL: PAINLEVEL: NO PAIN (0)

## 2018-04-03 NOTE — MR AVS SNAPSHOT
After Visit Summary   4/3/2018    Jos eJ Marley    MRN: [id number]           Patient Information     Date Of Birth          1970        Visit Information        Provider Department      4/3/2018 2:15 PM Barby Modi APRN CNP [facility]        Today's Diagnoses     Pre-operative cardiovascular examination    -  1      Care Instructions    You were seen by Barby Modi, 4/3/2018.     1.  An order will be placed for labs today.  Please stop by the clinic lab.  We will call you with the results.       2.  An order has been placed for an EKG.  You will have this done before you leave the clinic today.    You will follow up with Barby Modi as needed if you have any cardiac concerns.       Please call the cardiology office with problems, questions, or concerns at [phone].    If you experience chest pain, chest pressure, chest tightness, shortness of breath, fainting, lightheadedness, nausea, vomiting, or other concerning symptoms, please report to the Emergency Department or call 911. These symptoms may be emergent, and best treated in the Emergency Department.     Meagan Garcia RN  Cardiology   [facility]  [phone]              Follow-ups after your visit        Your next 10 appointments already scheduled     Apr 16, 2018  3:00 PM CDT   (Arrive by 2:45 PM)   Return Nutrition with Bonnie Marcus RD   [facility] ([facility] )    [address] [place] [id number] [phone]              Who to contact     If you have questions or need follow up information about today's clinic visit or your schedule please contact [facility] directly at [phone].  Normal or non-critical lab and imaging results will be communicated to you by MyChart, letter or phone within 4 business days after the clinic has received the results. If you do not hear from us within 7 days, please contact the clinic through  "[employer] or phone. If you have a critical or abnormal lab result, we will notify you by phone as soon as possible.  Submit refill requests through [employer] or call your pharmacy and they will forward the refill request to us. Please allow 3 business days for your refill to be completed.          Additional Information About Your Visit        [employer] Information     [employer] gives you secure access to your electronic health record. If you see a primary care provider, you can also send messages to your care team and make appointments. If you have questions, please call your primary care clinic.  If you do not have a primary care provider, please call [phone] and they will assist you.        Care EveryWhere ID     This is your Care EveryWhere ID. This could be used by other organizations to access your [facility] medical records  [id number]        Your Vitals Were     Pulse Temperature Respirations Height Pulse Oximetry BMI (Body Mass Index)    74 98.7  F (37.1  C) (Oral) 16 1.676 m (5' 6\") 100% 38.74 kg/m2       Blood Pressure from Last 3 Encounters:   04/03/18 (!) 148/92   03/04/18 169/93   02/07/18 (!) 149/96    Weight from Last 3 Encounters:   04/03/18 108.9 kg (240 lb)   03/15/18 113.1 kg (249 lb 4.8 oz)   02/07/18 116 kg (255 lb 11.2 oz)              Today, you had the following     No orders found for display         Today's Medication Changes          These changes are accurate as of 4/3/18  2:23 PM.  If you have any questions, ask your nurse or doctor.               These medicines have changed or have updated prescriptions.        Dose/Directions    lisinopril 10 MG tablet   Commonly known as:  PRINIVIL/ZESTRIL   This may have changed:    - how much to take  - when to take this  - additional instructions   Used for:  Essential hypertension        TAKE 1 TABLET BY MOUTH  DAILY   Quantity:  90 tablet   Refills:  3                Primary Care Provider Office Phone # Fax #    R Arnold Crockett [phone] " [phone]       [address] [facility] [address]        Equal Access to Services     SUKHJINDER ZAMAN : Hadii aad ku hadphilipp Rider, wakeyurda luqadaha, qaybta kaalmada parth, nidia libertad josesteffany altmandre kirby bren kemp. So [facility] [phone].    ATENCIÓN: Si habla español, tiene a paul disposición servicios gratuitos de asistencia lingüística. Llame al [phone].    We comply with applicable federal civil rights laws and [place] laws. We do not discriminate on the basis of race, color, national origin, age, disability, sex, sexual orientation, or gender identity.            Thank you!     Thank you for choosing [facility]  for your care. Our goal is always to provide you with excellent care. Hearing back from our patients is one way we can continue to improve our services. Please take a few minutes to complete the written survey that you may receive in the mail after your visit with us. Thank you!             Your Updated Medication List - Protect others around you: Learn how to safely use, store and throw away your medicines at www.disposemymeds.org.          This list is accurate as of 4/3/18  2:23 PM.  Always use your most recent med list.                   Brand Name Dispense Instructions for use Diagnosis    aspirin 81 MG tablet      Take  by mouth daily.        atorvastatin 10 MG tablet    LIPITOR    90 tablet    Take 1 tablet by mouth  daily    Pure hypercholesterolemia       fish oil-omega-3 fatty acids 1000 MG capsule      Take 1 g by mouth daily        GARLIC      daily.        gemfibrozil 600 MG tablet    LOPID    180 tablet    Take 1 tablet by mouth two  times daily    Elevated triglycerides with high cholesterol       IBUPROFEN PO      Take 800 mg by mouth every 6 hours as needed for moderate pain        lisinopril 10 MG tablet    PRINIVIL/ZESTRIL    90 tablet    TAKE 1 TABLET BY MOUTH  DAILY    Essential hypertension       metoprolol succinate 100 MG 24 hr tablet    TOPROL-XL     90 tablet    TAKE 1 TABLET BY MOUTH  DAILY    Essential hypertension       MULTIVITAMIN PO      Take  by mouth daily.

## 2018-04-03 NOTE — PATIENT INSTRUCTIONS
You were seen by Barby Modi, 4/3/2018.     1.  An order will be placed for labs today.  Please stop by the clinic lab.  We will call you with the results.       2.  An order has been placed for an EKG.  You will have this done before you leave the clinic today.    You will follow up with Barby Modi as needed if you have any cardiac concerns.       Please call the cardiology office with problems, questions, or concerns at [phone].    If you experience chest pain, chest pressure, chest tightness, shortness of breath, fainting, lightheadedness, nausea, vomiting, or other concerning symptoms, please report to the Emergency Department or call 911. These symptoms may be emergent, and best treated in the Emergency Department.     Meagan Garcia RN  Cardiology   [facility]  [phone]

## 2018-04-03 NOTE — PROGRESS NOTES
[facility] HEART CARE   CARDIOLOGY CONSULT     Jose J Marley   [address] [place] [address]      KRISTINE Crockett     Chief Complaint   Patient presents with     Consult     Surgery clearance.  Pt has no symptoms, but has elevated BP.  No past history of heart issues.  Biological father had a heart attack in his 40's and passed away at 59 from a heart attack.          HPI:   Mr. Marley is a 48 year old male who presents for pre operative cardiac clearance. Patient will be scheduled for bariatric surgery at the [facility] by Dr. Sandy. It has been requested that patient have preoperative cardiac clearance for bariatric surgery.     Patient has a history of hyperlipidemia, hypertriglyceridemia, HTN, obesity, ZEB with nightly use of CPAP, recently elevated A1C and family history of premature CAD.     Patient denies any past cardiac history. Denies any symptoms of chest pain or pressure. No shortness of breath or MELVIN. No palpitations, lightheadedness or syncope. No edema.    Patient has a family history of premature CAD, biological father with history of MI at 41 years of age, underwent CABG and ultimately passed as a result of acute MI at 59 years of age. Mr. Marley has no history of tobacco use or drug use, occasional alcohol consumption.     In review of his records, patient underwent a NM MPI exercise stress test on 11/30/15 with no evidence of ischemia or completed infarct. Patient was recently seen in the ER for HTN on 3/4/18. He was instructed to increase his Lisinopril from 10 mg to 20 mg daily. Since then he has increased to 30 mg daily and notes that his systolic pressure averages in the 140's with normal diastolic pressures in the 70's and heart rate stable at 60-70 bpm.      PAST MEDICAL HISTORY:   Past Medical History:   Diagnosis Date     Obesity, unspecified 01/01/2011     Pure hypercholesterolemia 08/04/2008     Unspecified essential hypertension 12/20/2006          FAMILY  HISTORY:   Family History   Problem Relation Age of Onset     C.A.D. Maternal Grandmother 67     cause of death     Hypertension Mother      Lipids Mother      hyperlipidemia          PAST SURGICAL HISTORY:   Past Surgical History:   Procedure Laterality Date     EXCISE MASS FINGER Right 4/21/2016    Procedure: EXCISE MASS FINGER;  Surgeon: Bimal Mitchell MD;  Location: [facility] OR     HERNIA REPAIR  2011    umbilical     HERNIA REPAIR  1972    inguinal          SOCIAL HISTORY:   Social History     Social History     Marital status:      Spouse name: N/A     Number of children: N/A     Years of education: N/A     Occupational History     [employer]  - Full-Time      Social History Main Topics     Smoking status: Never Smoker     Smokeless tobacco: Never Used     Alcohol use Yes      Comment: Rarely      Drug use: No     Sexual activity: Not Asked     Other Topics Concern     Caffeine Concern Yes     Soda, 24 oz daily      Parent/Sibling W/ Cabg, Mi Or Angioplasty Before 65f 55m? Yes     father     Social History Narrative          CURRENT MEDICATIONS:   Current Outpatient Prescriptions   Medication     lisinopril (PRINIVIL/ZESTRIL) 20 MG tablet     gemfibrozil (LOPID) 600 MG tablet     metoprolol succinate (TOPROL-XL) 100 MG 24 hr tablet     atorvastatin (LIPITOR) 10 MG tablet     aspirin 81 MG tablet     fish oil-omega-3 fatty acids (FISH OIL) 1000 MG capsule     GARLIC     Multiple Vitamins-Minerals (MULTIVITAMIN OR)     [DISCONTINUED] lisinopril (PRINIVIL/ZESTRIL) 10 MG tablet     IBUPROFEN PO     No current facility-administered medications for this visit.           ALLERGIES:   No Known Allergies       ROS:   CONSTITUTIONAL: No weight changes, fever, chills, weakness or fatigue.    CARDIOVASCULAR: No chest pain, chest pressure or chest discomfort. No palpitations or lower extremity edema.   RESPIRATORY: No shortness of breath, dyspnea upon exertion, cough or sputum production.   NEUROLOGICAL: No  "lightheadedness, dizziness, syncope, ataxia or weakness.   HEMATOLOGIC: No anemia, bleeding or bruising.   ENDOCRINOLOGIC: No reports of sweating, cold or heat intolerance.   SKIN: No abnormal rashes or itching.       PHYSICAL EXAM:   BP (!) 148/92 (BP Location: Right arm, Patient Position: Sitting, Cuff Size: Adult Regular)  Pulse 74  Temp 98.7  F (37.1  C) (Oral)  Resp 16  Ht 1.676 m (5' 6\")  Wt 108.9 kg (240 lb)  SpO2 100%  BMI 38.74 kg/m2  GENERAL: The patient is a well-developed, well-nourished, in no apparent distress. Alert and oriented x3.   HEENT: Head is normocephalic and atraumatic. Eyes are symmetrical with normal visual tracking. Nares appeared normal without nasal drainage. Mucous membranes are moist.   NECK: Supple. No carotid bruits. No JVD present.  HEART: Regular rate and rhythm, S1S2 present without murmur, rub or gallop.   LUNGS: Respirations regular and unlabored. Clear to auscultation.   EXTREMITIES: No peripheral edema present.  NEUROLOGIC: Alert and oriented X3. No focal neurologic deficits.   SKIN: No jaundice. No rashes or visible skin lesions present.       EKG:    NSR, rate 67 BPM, normal ECG    LAB RESULTS:   Office Visit on 02/07/2018   Component Date Value Ref Range Status     WBC 02/07/2018 7.3  4.0 - 11.0 10e9/L Final     RBC Count 02/07/2018 5.35  4.4 - 5.9 10e12/L Final     Hemoglobin 02/07/2018 13.4  13.3 - 17.7 g/dL Final     Hematocrit 02/07/2018 42.1  40.0 - 53.0 % Final     MCV 02/07/2018 79  78 - 100 fl Final     MCH 02/07/2018 25.0* 26.5 - 33.0 pg Final     MCHC 02/07/2018 31.8  31.5 - 36.5 g/dL Final     RDW 02/07/2018 14.7  10.0 - 15.0 % Final     Platelet Count 02/07/2018 298  150 - 450 10e9/L Final     Sodium 02/07/2018 138  133 - 144 mmol/L Final     Potassium 02/07/2018 3.8  3.4 - 5.3 mmol/L Final     Chloride 02/07/2018 105  94 - 109 mmol/L Final     Carbon Dioxide 02/07/2018 26  20 - 32 mmol/L Final     Anion Gap 02/07/2018 7  3 - 14 mmol/L Final     Glucose " 02/07/2018 103* 70 - 99 mg/dL Final     Urea Nitrogen 02/07/2018 19  7 - 30 mg/dL Final     Creatinine 02/07/2018 0.78  0.66 - 1.25 mg/dL Final     GFR Estimate 02/07/2018 >90  >60 mL/min/1.7m2 Final     GFR Estimate If Black 02/07/2018 >90  >60 mL/min/1.7m2 Final     Calcium 02/07/2018 9.2  8.5 - 10.1 mg/dL Final     Bilirubin Total 02/07/2018 0.3  0.2 - 1.3 mg/dL Final     Albumin 02/07/2018 4.1  3.4 - 5.0 g/dL Final     Protein Total 02/07/2018 8.2  6.8 - 8.8 g/dL Final     Alkaline Phosphatase 02/07/2018 78  40 - 150 U/L Final     ALT 02/07/2018 31  0 - 70 U/L Final     AST 02/07/2018 15  0 - 45 U/L Final     Parathyroid Hormone Intact 02/07/2018 30  12 - 72 pg/mL Final     Vitamin D Deficiency screening 02/07/2018 27  20 - 75 ug/L Final   Orders Only on 01/23/2018   Component Date Value Ref Range Status     Hemoglobin A1C 01/23/2018 6.5* 4.3 - 6.0 % Final     Cholesterol 01/23/2018 147  <200 mg/dL Final     Triglycerides 01/23/2018 210* <150 mg/dL Final     HDL Cholesterol 01/23/2018 37* >39 mg/dL Final     LDL Cholesterol Calculated 01/23/2018 68  <100 mg/dL Final     Non HDL Cholesterol 01/23/2018 110  <130 mg/dL Final     AST 01/23/2018 15  0 - 45 U/L Final     Sodium 01/23/2018 138  133 - 144 mmol/L Final     Potassium 01/23/2018 4.4  3.4 - 5.3 mmol/L Final     Chloride 01/23/2018 105  94 - 109 mmol/L Final     Carbon Dioxide 01/23/2018 25  20 - 32 mmol/L Final     Anion Gap 01/23/2018 8  3 - 14 mmol/L Final     Glucose 01/23/2018 107* 70 - 99 mg/dL Final     Urea Nitrogen 01/23/2018 14  7 - 30 mg/dL Final     Creatinine 01/23/2018 0.68  0.66 - 1.25 mg/dL Final     GFR Estimate 01/23/2018 >90  >60 mL/min/1.7m2 Final     GFR Estimate If Black 01/23/2018 >90  >60 mL/min/1.7m2 Final     Calcium 01/23/2018 9.2  8.5 - 10.1 mg/dL Final     Estimated Average Glucose 01/23/2018 140  mg/dL Final          ASSESSMENT:   Jose J Marley presents for for pre operative cardiac clearance. Patient will be scheduled for  bariatric surgery at the [facility] by Dr. Sandy. It has been requested that patient have preoperative cardiac clearance for bariatric surgery.   Patient has a history of hyperlipidemia, hypertriglyceridemia, HTN, obesity, ZEB with nightly use of CPAP, recently elevated A1C and family history of premature CAD.       PLAN:   1. Pre-operative cardiovascular examination  Patient is here for cardiac clearance bariatric surgery. He has no reported anginal symptoms. He has a history of HTN and hyperlipidemia. ECG stable today. No indication for further cardiac work-up.   Additional preoperative labs today, see orders.     - Basic metabolic panel  - Magnesium  - EKG 12-lead, tracing only (Same Day)    2. ZEB (obstructive sleep apnea)  Continue with nightly use of CPAP.    3. Morbid obesity (H)  Bariatric surgery status    4. Mixed hyperlipidemia  Continue with Gemfibrozil and Atrovastatin    5. Essential hypertension  BP improved, systolic pressure remain slightly elevated. It is recommended that he increase Lisinopril to total of 40 kg daily.   If pressures remain elevated consider adding Amlodipine 5 mg daily.    - lisinopril (PRINIVIL/ZESTRIL) 20 MG tablet; Take 1 tablet (20 mg) by mouth 2 times daily  Dispense: 120 tablet; Refill: 3    6. Family history of premature CAD  With his given family history he could consider CT calicum scoring for risk stratification. He could have this following his bariatric surgery and is not indicated preoperative.     7. Pre-diabetes  Elevated A1C noted, patient working on dietary changes and bariatric surgery status. Reports that he has not started metformin to allow for personal changes.     - lisinopril (PRINIVIL/ZESTRIL) 20 MG tablet; Take 1 tablet (20 mg) by mouth 2 times daily  Dispense: 120 tablet; Refill: 3    Follow-up with cardiology as needed in the future. I am happy to provide Mr. Marley full cardiac preoperative clearance for bariatric surgery with no post  operative treatment recommendations.     Thank you for allowing me to participate in the care of your patient. Please do not hesitate to contact me if you have any questions.     Barby Modi   [facility] Cardiology

## 2018-04-03 NOTE — NURSING NOTE
"Chief Complaint   Patient presents with     Consult     Surgery clearance.  Pt has no symptoms, but has elevated BP.  No past history of heart issues.  Biological father had a heart attack in his 40's and passed away at 59 from a heart attack.         Initial BP (!) 148/92 (BP Location: Right arm, Patient Position: Sitting, Cuff Size: Adult Regular)  Pulse 74  Temp 98.7  F (37.1  C) (Oral)  Resp 16  Ht 1.676 m (5' 6\")  Wt 108.9 kg (240 lb)  SpO2 100%  BMI 38.74 kg/m2 Estimated body mass index is 38.74 kg/(m^2) as calculated from the following:    Height as of this encounter: 1.676 m (5' 6\").    Weight as of this encounter: 108.9 kg (240 lb).  Medication Reconciliation: complete     Meagan Garcia RN    "

## 2018-04-16 ENCOUNTER — HOSPITAL ENCOUNTER (OUTPATIENT)
Dept: NUTRITION | Facility: HOSPITAL | Age: 48
Discharge: HOME | End: 2018-04-16
Attending: FAMILY MEDICINE | Admitting: FAMILY MEDICINE
Payer: COMMERCIAL

## 2018-04-16 PROCEDURE — 97803 MED NUTRITION INDIV SUBSEQ: CPT | Performed by: DIETITIAN, REGISTERED

## 2018-04-16 PROCEDURE — 97802 MEDICAL NUTRITION INDIV IN: CPT | Performed by: DIETITIAN, REGISTERED

## 2018-04-17 VITALS — WEIGHT: 248.1 LBS | HEIGHT: 66 IN | BODY MASS INDEX: 39.87 KG/M2

## 2018-04-17 NOTE — PROGRESS NOTES
"[place] [facility] SERVICES  Medical Nutrition Therapy    Visit Type: Reassessment    Jose J Marley referred for MNT related to Obesity    Nutrition Assessment:  Anthropometrics  Height: .  167.6 cm (5' 6\") BMI:    40.13   Weight:  112.5 kg (248 lb 1.6 oz) BSA:  2.29   IBW (kg):  Male: 64.4         Nutrition History  Pt's diet is similar to what he shared during his last visit. He will have a snack at work such as string cheese or peanuts and no longer snacks after supper. He is finding eating slower and limiting second servings a bit difficult. Physical activity has decreased over the past couple weeks since easter but he plans to get back on track. Is drinking less diet coke, does not feel it will be hard for him to give up after surgery.    Physical Activity   Slowed down a little. Still walks about a mile at work and uses the elliptical 2-3 x a week.    Nutrition Diagnosis:   Obesity related to excess energy intake as evidenced by BMI 40.13.     Nutrition Intervention:  Reviewed the following:  -portion size- snack foods such as nuts- healthy fat but still is high in calories  -physical activity to promote weight loss  - bariatric diet limitations after surgery  - Finding motivation- short term and long term goals     Nutrition Goals:  1. 30 minutes 5 days a week of physical activity  2. 1 can diet coke a day  3. 2 serving veggies a day  4. 1 lb weight loss per week     Nutrition Follow Up / Monitoring:   diet recall, weight, physical activity, labs     Nutrition Recommendations:   1500 kcal diet, focus on fruits, vegetables, lean meats, low fat dairy and whole grains. Continue to increase physical activity level.     Patient to follow-up with RD in 1 month.  Patient has RD contact information to call/email if needed.                    "

## 2018-05-11 ENCOUNTER — MYC MEDICAL ADVICE (OUTPATIENT)
Dept: FAMILY MEDICINE | Facility: OTHER | Age: 48
End: 2018-05-11

## 2018-05-11 DIAGNOSIS — R73.03 PRE-DIABETES: ICD-10-CM

## 2018-05-11 DIAGNOSIS — I10 ESSENTIAL HYPERTENSION: ICD-10-CM

## 2018-05-11 RX ORDER — LISINOPRIL 20 MG/1
20 TABLET ORAL 2 TIMES DAILY
Qty: 120 TABLET | Refills: 3 | Status: SHIPPED | OUTPATIENT
Start: 2018-05-11 | End: 2018-08-11

## 2018-05-11 NOTE — TELEPHONE ENCOUNTER
Medication was increased by Amita Modi last month. Needs refill sent to mail order pharmacy. Is working well for him.  Joyce Quinonez LPN

## 2018-05-14 ENCOUNTER — HOSPITAL ENCOUNTER (OUTPATIENT)
Dept: NUTRITION | Facility: HOSPITAL | Age: 48
Discharge: HOME | End: 2018-05-14
Attending: FAMILY MEDICINE | Admitting: FAMILY MEDICINE
Payer: COMMERCIAL

## 2018-05-14 PROCEDURE — 97803 MED NUTRITION INDIV SUBSEQ: CPT | Performed by: DIETITIAN, REGISTERED

## 2018-05-15 VITALS — WEIGHT: 244.5 LBS | BODY MASS INDEX: 39.29 KG/M2 | HEIGHT: 66 IN

## 2018-05-15 NOTE — PROGRESS NOTES
"[place] [facility]  Medical Nutrition Therapy    Visit Type: Reassessment    Jose J Marley referred for MNT related to Obesity- weight loss surgery      Nutrition Assessment:  Anthropometrics  Height: .  167.6 cm (5' 6\") BMI:    39.55   Weight:  110.9 kg (244 lb 8 oz) BSA:  2.27   IBW (kg): Male: 64.4       Weight hx:  Initial weight 255lb  3/15/18- 249lb 4.8oz  4/16/18- 248lb 1.6oz    Nutrition History  Since last visit, Harsh has been focusing on serving sizes. When he went out to eat with his family, he had a large salad before eating a main dish and had smaller portions. His intake of of carbohydrate foods has decreased, specifically from bread. He is still drinking 1-2 sodas a day because he wants to drink it for as long as he can before surgery. Pt has met goals of eating 2 servings of vegetables a day, 30 minutes of activity daily, and 1 lb weight loss per week.    Physical Activity  Pt reports getting more than 30 minutes of activity a day. His daily activity has increased with the nicer weather. He is outside more doing chores such as raking leaves and will be starting to mow the lawn.     Nutrition Diagnosis:   Obesity related to excess energy intake as evidenced by BMI 39.46..      Nutrition Intervention:  Reviewed the following:  - physical activity to promote weight loss  - Eliminating soda intake before surgery  - limiting added salt for high blood pressure  - handouts- different ways to eat more fruits and veggies, total body diet (eat right, physical activity, mental health)  - Setting goals      Nutrition Goals:  1. 30 minutes 5 days a week of physical activity- start including muscle strengthening exercises  2.  Limit soda intake to 1 can on weekend days. Work towards eliminating.  3. 2 serving veggies a day with 1 fruit a day  4. 1 lb weight loss per week      Nutrition Follow Up / Monitoring:   diet recall, weight, physical activity, labs      Nutrition Recommendations:   1500 kcal " diet, focus on fruits, vegetables, lean meats, low fat dairy and whole grains. Continue to increase physical activity level.

## 2018-06-11 ENCOUNTER — HOSPITAL ENCOUNTER (OUTPATIENT)
Dept: NUTRITION | Facility: HOSPITAL | Age: 48
Discharge: HOME | End: 2018-06-11
Attending: FAMILY MEDICINE | Admitting: FAMILY MEDICINE
Payer: COMMERCIAL

## 2018-06-11 ENCOUNTER — TRANSFERRED RECORDS (OUTPATIENT)
Dept: HEALTH INFORMATION MANAGEMENT | Facility: CLINIC | Age: 48
End: 2018-06-11

## 2018-06-11 PROCEDURE — 97803 MED NUTRITION INDIV SUBSEQ: CPT | Performed by: DIETITIAN, REGISTERED

## 2018-06-12 VITALS — HEIGHT: 66 IN | WEIGHT: 245.5 LBS | BODY MASS INDEX: 39.46 KG/M2

## 2018-06-12 NOTE — PROGRESS NOTES
"[place] [facility]  Medical Nutrition Therapy    Visit Type: Reassessment    Jose J Marley referred for MNT related to Obesity- weight loss surgery      Nutrition Assessment:  Anthropometrics  Height: .  167.6 cm (5' 6\") BMI:    39.71   Weight:  111.4 kg (245 lb 8 oz) BSA:  2.28   IBW (kg):   Male: 64.4       Weight hx:  Initial weight 255lb  3/15/18- 249lb 4.8oz  4/16/18- 248lb 1.6oz  5/14/18- 244lb 8oz    Nutrition History   Since our last session Harsh is still working on trying to stick to appropriate sized portions. He continues to try to reduce his intake of soda. He is down to drinking 5 a week from 1-2 per day. He is now determined that today is his last day of soda consumption and to remain soda free. He went to a [employer] party recently and limited himself to a taco in a bag (doritos, taco meat, lettuce, tomatoes, onion, and sour cream), strawberries and a few other items. He also went out to eat yesterday for breakfast, he made the choice to have the smaller omelette with toast for his meal. He continues to eat 2 servings of vegetables daily and is still working to incorporate more fruit into his diet. He continues to walk at work (about 15 minutes) and is more active outside doing chores. He has to mow 3 lawns but splits this responsibility with his son that will mow one of them. Noted 1 lb weight gain.      Nutrition Diagnosis:   Obesity related to excess energy intake as evidenced by BMI 39.62.      Nutrition Intervention:  Reviewed the following:  - Physical activity to promote weight loss- 30 minutes beyond normal activity level. Work to increase duration, intensity or both.  - Eliminating soda intake before surgery- after today he will eliminate soda. Other options include calorie free flavored ceron or flavor packets to add to water.  - Tips for eating out.   - Continuing motivation after surgery. Family support. Continue setting goals. Sister-in-law had weight loss surgery in the past, " good resource and support.  - Expectations for diet before and after surgery.  - Encouragement to continue building on the healthy habits he has started. Call with questions or if he needs some motivation. Have a plan to help overcome roadblocks such as stress and sadness (cleaning the car, listening to music), rather than turning to food.    Nutrition Goals:  1. 30 minutes 5 days a week of physical activity- start including muscle strengthening exercises  2. Eliminate soda from diet starting today.  3. 2 serving veggies a day with 1 fruit a day  4. 1 lb weight loss per week      Nutrition Follow Up / Monitoring:   diet recall, weight, physical activity, labs      Nutrition Recommendations:   1500 kcal diet, focus on fruits, vegetables, lean meats, low fat dairy and whole grains. Continue to increase physical activity level.

## 2018-06-25 ENCOUNTER — CARE COORDINATION (OUTPATIENT)
Dept: SURGERY | Facility: CLINIC | Age: 48
End: 2018-06-25

## 2018-06-25 DIAGNOSIS — E66.01 MORBID OBESITY DUE TO EXCESS CALORIES (H): Primary | ICD-10-CM

## 2018-07-20 ENCOUNTER — HOSPITAL ENCOUNTER (OUTPATIENT)
Dept: NUTRITION | Facility: HOSPITAL | Age: 48
Discharge: HOME | End: 2018-07-20
Attending: FAMILY MEDICINE | Admitting: FAMILY MEDICINE
Payer: COMMERCIAL

## 2018-07-20 VITALS — WEIGHT: 241 LBS | HEIGHT: 66 IN | BODY MASS INDEX: 38.73 KG/M2

## 2018-07-20 PROCEDURE — 97803 MED NUTRITION INDIV SUBSEQ: CPT | Performed by: DIETITIAN, REGISTERED

## 2018-07-20 NOTE — PROGRESS NOTES
"[place] [facility]  Medical Nutrition Therapy    Visit Type: Reassessment    Jose J Marley referred for MNT related to obesity/ weight loss surgery    Nutrition Assessment:  Anthropometrics  Height: .  167.6 cm (5' 6\") BMI:    38.98   Weight:  109.3 kg (241 lb) BSA:  2.26   IBW (kg):  Male: 64.4           Nutrition History  Harsh continues to focus on serving size and incorporating more fruits and vegetables into his day. Drinks protein shake in the morning for breakfast. He has not been out to eat or had events to go to since last session. He is still drinking 1-2 diet cokes per week. Pt reports he has an appointment with his surgeon on August 9th and surgery is scheduled for August 28th. Overall, Harsh has made many healthy lifestyle choices and continues to lose weight. He is feeling good about his upcoming surgery and is excited about his progress. He has support from his family including a sister-in-law that has also had the gastric sleeve.      Physical Activity   Has picked back up with activity. Walking at work and more active at home with the nicer weather.      Nutrition Diagnosis:   Obesity related to excess energy intake as evidenced by BMI 39.62.      Nutrition Intervention:  Reviewed the following:  - Elimination of diet coke. Spoke with pt about need to eliminate from diet before surgery and continue after surgery.  - Discussed possible requirements expected of him for diet before and after surgery (differs between surgical centers). Liquid diet before surgery and gradual diet advancement after. Vitamin and mineral supplements for life.     Nutrition Goals:  1. 30 minutes 5 days a week of physical activity- start including muscle strengthening exercises  2. Eliminate soda one week from appointment August 9th  4. 1 lb weight loss per week      Nutrition Follow Up / Monitoring:   diet recall, weight, physical activity, labs      Nutrition Recommendations:   1500 kcal diet, focus on fruits, " vegetables, lean meats, low fat dairy and whole grains. Continue to increase physical activity level.

## 2018-08-09 ENCOUNTER — OFFICE VISIT (OUTPATIENT)
Dept: SURGERY | Facility: CLINIC | Age: 48
End: 2018-08-09
Payer: COMMERCIAL

## 2018-08-09 ENCOUNTER — APPOINTMENT (OUTPATIENT)
Dept: SURGERY | Facility: CLINIC | Age: 48
End: 2018-08-09
Payer: COMMERCIAL

## 2018-08-09 ENCOUNTER — ALLIED HEALTH/NURSE VISIT (OUTPATIENT)
Dept: SURGERY | Facility: CLINIC | Age: 48
End: 2018-08-09
Payer: COMMERCIAL

## 2018-08-09 ENCOUNTER — ANESTHESIA EVENT (OUTPATIENT)
Dept: SURGERY | Facility: CLINIC | Age: 48
End: 2018-08-09

## 2018-08-09 VITALS
HEIGHT: 66 IN | DIASTOLIC BLOOD PRESSURE: 86 MMHG | TEMPERATURE: 98.4 F | BODY MASS INDEX: 39.05 KG/M2 | OXYGEN SATURATION: 97 % | SYSTOLIC BLOOD PRESSURE: 144 MMHG | WEIGHT: 243 LBS | HEART RATE: 92 BPM

## 2018-08-09 VITALS
DIASTOLIC BLOOD PRESSURE: 92 MMHG | TEMPERATURE: 98.4 F | HEART RATE: 62 BPM | HEIGHT: 66 IN | WEIGHT: 234.9 LBS | SYSTOLIC BLOOD PRESSURE: 155 MMHG | BODY MASS INDEX: 37.75 KG/M2 | OXYGEN SATURATION: 97 %

## 2018-08-09 VITALS — WEIGHT: 243.9 LBS | BODY MASS INDEX: 39.37 KG/M2

## 2018-08-09 DIAGNOSIS — Z01.818 PREOP EXAMINATION: ICD-10-CM

## 2018-08-09 DIAGNOSIS — E66.9 OBESITY, UNSPECIFIED CLASSIFICATION, UNSPECIFIED OBESITY TYPE, UNSPECIFIED WHETHER SERIOUS COMORBIDITY PRESENT: ICD-10-CM

## 2018-08-09 DIAGNOSIS — Z01.818 PREOP EXAMINATION: Primary | ICD-10-CM

## 2018-08-09 DIAGNOSIS — E66.01 MORBID OBESITY DUE TO EXCESS CALORIES (H): Primary | ICD-10-CM

## 2018-08-09 LAB
ALBUMIN UR-MCNC: NEGATIVE MG/DL
ANION GAP SERPL CALCULATED.3IONS-SCNC: 6 MMOL/L (ref 3–14)
APPEARANCE UR: CLEAR
BILIRUB UR QL STRIP: NEGATIVE
BUN SERPL-MCNC: 14 MG/DL (ref 7–30)
CALCIUM SERPL-MCNC: 9.1 MG/DL (ref 8.5–10.1)
CHLORIDE SERPL-SCNC: 108 MMOL/L (ref 94–109)
CO2 SERPL-SCNC: 26 MMOL/L (ref 20–32)
COLOR UR AUTO: YELLOW
CREAT SERPL-MCNC: 0.79 MG/DL (ref 0.66–1.25)
ERYTHROCYTE [DISTWIDTH] IN BLOOD BY AUTOMATED COUNT: 15.6 % (ref 10–15)
GFR SERPL CREATININE-BSD FRML MDRD: >90 ML/MIN/1.7M2
GLUCOSE SERPL-MCNC: 94 MG/DL (ref 70–99)
GLUCOSE UR STRIP-MCNC: NEGATIVE MG/DL
HCT VFR BLD AUTO: 42.3 % (ref 40–53)
HGB BLD-MCNC: 13.5 G/DL (ref 13.3–17.7)
HGB UR QL STRIP: NEGATIVE
KETONES UR STRIP-MCNC: NEGATIVE MG/DL
LEUKOCYTE ESTERASE UR QL STRIP: NEGATIVE
MCH RBC QN AUTO: 25.2 PG (ref 26.5–33)
MCHC RBC AUTO-ENTMCNC: 31.9 G/DL (ref 31.5–36.5)
MCV RBC AUTO: 79 FL (ref 78–100)
NITRATE UR QL: NEGATIVE
PH UR STRIP: 5 PH (ref 5–7)
PLATELET # BLD AUTO: 317 10E9/L (ref 150–450)
POTASSIUM SERPL-SCNC: 4.4 MMOL/L (ref 3.4–5.3)
RBC # BLD AUTO: 5.35 10E12/L (ref 4.4–5.9)
SODIUM SERPL-SCNC: 140 MMOL/L (ref 133–144)
SOURCE: NORMAL
SP GR UR STRIP: 1.02 (ref 1–1.03)
UROBILINOGEN UR STRIP-MCNC: 0 MG/DL (ref 0–2)
WBC # BLD AUTO: 8 10E9/L (ref 4–11)

## 2018-08-09 ASSESSMENT — PAIN SCALES - GENERAL: PAINLEVEL: NO PAIN (0)

## 2018-08-09 NOTE — PATIENT INSTRUCTIONS
It was a pleasure meeting with you today.     Thank you for allowing us the privilege of caring for you. We hope we provided you with the excellent service you deserve.     Please let us know if there is anything else we can do for you so that we can be sure you are leaving completely satisfied with your care experience.      You saw Dr Sandy today.     Instructions per today's visit:     1) call dennise for surgery date      To schedule appointments with our team, please call [phone] option #1    Please call during clinic hours Monday through Friday 8:00a - 4:00p if you have questions or you can contact us via [facility] at anytime.      Nurses: [phone]  Fax: [phone]  Surgery Scheduler: [phone]    Please call the hospital at [phone] to speak with our on call MDs if you have urgent needs after hours, during weekends, or holidays.    *For patients who are currently enrolled in our program and have not yet had weight loss surgery please note*:    You must be at your required goal weight at the time of your Anesthesia clinic visit as well as the day of surgery or your surgery will be cancelled. You will not be able to obtain a new surgery date until you have met your goal weight.    Weight loss medications before and after surgery protocol:    Adipex (phentermine): Stop two days before surgery. Do not restart after surgery. May reconsider restarting as needed in the future.    Topimax (topiramate): Can take right up to surgery including morning of surgery and restart post op day #1.    Qsymia (phentermine/topiramate): Hold morning of surgery. Restart after surgery post op day #1    Contrave (bupropion/naltrxone): Fast taper before surgery. 1 tablet twice daily for 1 week and then discontinue 4 days before surgery.  Will reconsider restarting at postop appt once no longer taking narcotic pain meds.  Will slowly ramp up again.    Naltrexone: Stop 4 days before surgery.  Will reconsider starting again  at postop appts when no longer taking narcotic pain meds.    Belviq (locaserin): Stop 2 days before surgery and restart immediately after surgery    Victoza(liraglutide)/Saxenda(liraglutide 3mg)/Trulicity (dulaglutide) (Any GLP-1): Can take up to surgery date and restart immediately after surgery.    Main follow-up parameters for all bariatric patients     Patients who have undergone Bariatric surgery:    1. Follow-up interval during the first year: ~1 week, 1 month, 3 month, 6 month,12 months.  Every 6 months until 5 years; and then annually thereafter.  The goal of follow-up sessions is to ensure that food intake behavior (choice of food and eating speed) and exercise/activity level are set appropriately.    2. Yearly lab tests ordered by our clinic.      3. The bariatric team should be aware and potentially evaluate all adverse gastrointestinal symptoms (dysphagia, abdominal pain, nausea, vomiting, diarrhea, heartburn, reflux, etc), which can be a sign of complication.  The bariatric surgeons perform general surgery procedures in addition to bariatric surgery (laparoscopic cholecystectomy, etc.)    4. Inability to tolerate textured food (chicken, steak, fish, eggs, beans) is NOT normal and may need to be evaluated by endoscopy performed by the bariatric surgeon.

## 2018-08-09 NOTE — PROGRESS NOTES
Nutrition Education Consult:  Nutrition education regarding clear and low-fat full liquid diet for post-bariatric surgery (SG).  Patient referred by Dr Sandy(8/9/18).  Diet History:  Pt reports no history of receiving clear and low-fat full liquid diet education after bariatric surgery in the past.  Nutrition Diagnosis:  Food- and Nutrition-related knowledge deficit r/t lack of prior exposure to information AEB pt unable to verbalize main points of bariatric clear and low-fat full liquid diet.  Interventions:  Provided instruction on bariatric clear and low-fat full liquid diets.  Provided the following handouts: Diet Guidelines for Bariatric Surgery, Sources of Protein, Keeping Track of Your Fluids, list of recommended vitamin/mineral supplementation after SG surgery and RD contact information.  Discussed protein shakes available in clinic, patient stated he uses premier protein right now.  Patient Understanding: good  Expected Compliance: good    Goals:   1. Follow the bariatric post-op diet advancement schedule:  - Bariatric clear liquid diet through post-op day 1 (while in the hospital).  - Bariatric low-fat full liquid diet on post-op days 2 through 13 (2 weeks).  2. Sip on 48-64 oz (or greater) fluids daily, recording intake to help stay on-track.  - Drink at least 2 oz of fluid every 30 min.  Time: 30 minutes.  Raissa Martines, RD, LD

## 2018-08-09 NOTE — H&P
Pre-Operative H & P     CC:  Preoperative exam to assess for increased cardiopulmonary risk while undergoing surgery and anesthesia.    Date of Encounter: 8/9/2018  Primary Care Physician:  KRISTINE Crockett  Associated diagnosis:  obesity    HPI  Jose J Marley is a 48 year old male who presents for pre-operative H & P in preparation for an unspecified bariatric surgery with Dr. Sandy on TBD at [facility].     Jose J Marley is a 48 year old male with hyperlipidemia, hypertension, sleep apnea and prediabetes that is obese.  He has been overweight since his late teens.  He has attempted exercise, the atkins diet and prescription medications to manage his obesity, but has never lost more than 10lbs.  He has consulted with the bariatric surgery clinic providers and has elected to proceed with surgery for treatment.      History is obtained from the patient.     Past Medical History  Past Medical History:   Diagnosis Date     Obesity, unspecified 01/01/2011     Prediabetes      Pure hypercholesterolemia 08/04/2008     Sleep apnea      Unspecified essential hypertension 12/20/2006       Past Surgical History  Past Surgical History:   Procedure Laterality Date     EXCISE MASS FINGER Right 4/21/2016    Procedure: EXCISE MASS FINGER;  Surgeon: Bimal Mitchell MD;  Location: [facility]     HERNIA REPAIR  2011    umbilical     HERNIA REPAIR  1972    inguinal       Hx of Blood transfusions/reactions: none     Hx of abnormal bleeding or anti-platelet use: aspirin      Steroid use in the last year: none    Personal or FH with difficulty with Anesthesia:  none    Prior to Admission Medications  Current Outpatient Prescriptions   Medication Sig Dispense Refill     aspirin 81 MG tablet Take  by mouth daily.       atorvastatin (LIPITOR) 10 MG tablet Take 1 tablet by mouth  daily 90 tablet 3     fish oil-omega-3 fatty acids (FISH OIL) 1000 MG capsule Take 1 g by mouth daily         GARLIC daily.       gemfibrozil (LOPID) 600 MG tablet Take 1 tablet by mouth two  times daily 180 tablet 3     IBUPROFEN PO Take 800 mg by mouth every 6 hours as needed for moderate pain       lisinopril (PRINIVIL/ZESTRIL) 20 MG tablet Take 1 tablet (20 mg) by mouth 2 times daily 120 tablet 3     metoprolol succinate (TOPROL-XL) 100 MG 24 hr tablet TAKE 1 TABLET BY MOUTH  DAILY 90 tablet 3     Multiple Vitamins-Minerals (MULTIVITAMIN OR) Take  by mouth daily.         Allergies  No Known Allergies    Social History  Social History     Social History     Marital status:      Spouse name: N/A     Number of children: 2     Years of education: N/A     Occupational History     supervisor      Social History Main Topics     Smoking status: Never Smoker     Smokeless tobacco: Never Used     Alcohol use Yes      Comment: Rarely      Drug use: No     Sexual activity: Not on file     Other Topics Concern     Caffeine Concern Yes     Soda, 24 oz daily      Parent/Sibling W/ Cabg, Mi Or Angioplasty Before 65f 55m? Yes     father     Social History Narrative       Family History  Family History   Problem Relation Age of Onset     Myocardial Infarction Maternal Grandmother 67     cause of death     Hypertension Mother      Hyperlipidemia Mother      Myocardial Infarction Father 45     Coronary Artery Disease Early Onset Father      Hypertension Paternal Half-Brother                ROS/MED HX  The complete review of systems is negative other than noted in the HPI or here.   ENT/Pulmonary:     (+)sleep apnea, uses CPAP 7 cmH2O , . .    Neurologic:  - neg neurologic ROS     Cardiovascular:     (+) Dyslipidemia, hypertension----. : . . . :. . Previous cardiac testing date:results:Stress Testdate:2015 results:ECG reviewed date:4/2018 results: date: results:          METS/Exercise Tolerance:  >4 METS   Hematologic:  - neg hematologic  ROS       Musculoskeletal:  - neg musculoskeletal ROS       GI/Hepatic:  - neg GI/hepatic ROS   "     Renal/Genitourinary:  - ROS Renal section negative       Endo:     (+) Obesity, Other Endocrine Disorder prediabetes.      Psychiatric:  - neg psychiatric ROS       Infectious Disease:  - neg infectious disease ROS       Malignancy:      - no malignancy   Other:    (+) no H/O Chronic Pain,                   Temp: 98.4  F (36.9  C)   BP: 144/86 Pulse: 92     SpO2: 97 %         243 lbs 0 oz  5' 6\"   Body mass index is 39.22 kg/(m^2).       Physical Exam  Constitutional: Awake, alert, cooperative, no apparent distress, and appears stated age. obese  Eyes: Pupils equal, round and reactive to light, extra ocular muscles intact, sclera clear, conjunctiva normal.  HENT: Normocephalic, oral pharynx with moist mucus membranes, good dentition. No goiter appreciated.   Respiratory: Clear to auscultation bilaterally, no crackles or wheezing.  Cardiovascular: Regular rate and rhythm, normal S1 and S2, and no murmur noted.  Carotids +2, no bruits. No edema. Palpable pulses to radial  DP and PT arteries.   GI: Normal bowel sounds, soft, non-distended, non-tender, no masses palpated, no hepatosplenomegaly.    Lymph/Hematologic: No cervical lymphadenopathy and no supraclavicular lymphadenopathy.  Genitourinary:  deferred  Skin: Warm and dry.  No rashes at anticipated surgical site.   Musculoskeletal: Full ROM of neck. There is no redness, warmth, or swelling of the exposed joints. Gross motor strength is normal.    Neurologic: Awake, alert, oriented to name, place and time. Cranial nerves II-XII are grossly intact. Gait is normal.   Neuropsychiatric: Calm, cooperative. Normal affect.     Labs: (personally reviewed)   Component      Latest Ref Rng & Units 8/9/2018   Color Urine       Yellow   Appearance Urine       Clear   Glucose Urine      NEG:Negative mg/dL Negative   Bilirubin Urine      NEG:Negative Negative   Ketones Urine      NEG:Negative mg/dL Negative   Specific Gravity Urine      1.003 - 1.035 1.019   Blood Urine      " NEG:Negative Negative   pH Urine      5.0 - 7.0 pH 5.0   Protein Albumin Urine      NEG:Negative mg/dL Negative   Urobilinogen mg/dL      0.0 - 2.0 mg/dL 0.0   Nitrite Urine      NEG:Negative Negative   Leukocyte Esterase Urine      NEG:Negative Negative   Source       Midstream Urine   Sodium      133 - 144 mmol/L 140   Potassium      3.4 - 5.3 mmol/L 4.4   Chloride      94 - 109 mmol/L 108   Carbon Dioxide      20 - 32 mmol/L 26   Anion Gap      3 - 14 mmol/L 6   Glucose      70 - 99 mg/dL 94   Urea Nitrogen      7 - 30 mg/dL 14   Creatinine      0.66 - 1.25 mg/dL 0.79   GFR Estimate      >60 mL/min/1.7m2 >90   GFR Estimate If Black      >60 mL/min/1.7m2 >90   Calcium      8.5 - 10.1 mg/dL 9.1   WBC      4.0 - 11.0 10e9/L 8.0   RBC Count      4.4 - 5.9 10e12/L 5.35   Hemoglobin      13.3 - 17.7 g/dL 13.5   Hematocrit      40.0 - 53.0 % 42.3   MCV      78 - 100 fl 79   MCH      26.5 - 33.0 pg 25.2 (L)   MCHC      31.5 - 36.5 g/dL 31.9   RDW      10.0 - 15.0 % 15.6 (H)   Platelet Count      150 - 450 10e9/L 317         EK2018  NSR    Stress test:     Stress test     IMPRESSION:  1. No evidence of myocardial ischemia.  2. Normal left ventricular function.              ASSESSMENT and PLAN  Jose J Marley is a 48 year old not yet scheduled for an unspecified bariatric surgery on TBD by Dr. Sandy in treatment of morbid obesity.   PAC referral for risk assessment and optimization for anesthesia with comorbid conditions of: hyperlipidemia, hypertension, sleep apnea and prediabetes.    Pre-operative considerations:  1.  Cardiac:  Functional status- METS >4.  He is walking laps at work for exercise throughout the day.  He does at least a mile.  Hypertension is managed with lisinopril; hold DOS.  Intermediate risk surgery with 0.4% risk of major adverse cardiac event.   2.  Pulm:  Airway feasible.  He has known sleep apnea and uses his CPAP as directed.   3.  GI:  Risk of PONV score = 2.  If > 2, anti-emetic  intervention recommended.    VTE risk: 0.5%    Patient is optimized and is acceptable candidate for the proposed procedure.  No further diagnostic evaluation is needed.             Naa Buitrago DNP, RN, APRN  Preoperative Assessment Center  [facility]  Clinic and Surgery Center  Phone: [phone]  Fax: [phone]

## 2018-08-09 NOTE — MR AVS SNAPSHOT
MRN:[id number]                      After Visit Summary   8/9/2018    Jose J Marley    MRN: [id number]           Visit Information        Provider Department      8/9/2018 10:30 AM Raissa Martines RD [facility] Surgical Weight Management        Your next 10 appointments already scheduled     Aug 09, 2018 11:00 AM CDT   Nurse Visit with  Surgery Nurse   General Surgery ([facility])    [address] [place] [address]   [phone]            Aug 09, 2018 11:20 AM CDT   (Arrive by 11:05 AM)   Pre Bariatric Surgery with Santosh Sandy MD   [facility] Surgical Weight Management ([facility])    [address] [place] [address]   [phone]            Aug 09, 2018 12:00 PM CDT   (Arrive by 11:45 AM)   PAC EVALUATION with TOBY Elise CNP   [facility] Preoperative Assessment Center ([facility])    [address] [place] [address]   [phone]            Aug 09, 2018  1:00 PM CDT   (Arrive by 12:45 PM)   PAC RN ASSESSMENT with  Pac Rn   [facility] Preoperative Assessment [place] ([facility])    [address] [place] [address]   [phone]            Aug 09, 2018  1:30 PM CDT   (Arrive by 1:15 PM)   PAC Anesthesia Consult with  Pac Anesthesiologist   [facility] Preoperative Assessment [place] ([facility])    [address] [place] [address]   [phone]              Care Instructions    Goals:   1. Follow the bariatric post-op diet advancement schedule:  - Bariatric clear liquid diet through post-op day 1 (while in the hospital).  - Bariatric low-fat full liquid diet on post-op days 2 through 13 (2 weeks).  2. Sip on 48-64 oz (or greater) fluids daily, recording intake to help stay on-track.  - Drink at least 2 oz of fluid  every 30 min.    Follow up with RD one week after surgery    Raissa Martines RD, LD  If you need to schedule or reschedule with a dietitian please call [phone] or [phone].          "[employer] Information     "[employer] gives you secure access to your electronic health record. If you see a primary care provider, you can also send messages to your care team and make appointments. If you have questions, please call your primary care clinic.  If you do not have a primary care provider, please call [phone] and they will assist you.      "[employer] is an electronic gateway that provides easy, online access to your medical records. With "[employer] you can request a clinic appointment, read your test results, renew a prescription or communicate with your care team.     To access your existing account, please contact your [facility] or call [phone] for assistance.        Care EveryWhere ID     This is your Care EveryWhere ID. This could be used by other organizations to access your [place] medical records  [id number]        Equal Access to Services     SUKHJINDER ZAMAN : Hadii victor m parekho Sobubba, waaxda luqadaha, qaybta kaalmajahaira alba, nidia kemp. So [facility] [phone].    ATENCIÓN: Si habla español, tiene a paul disposición servicios gratuitos de asistencia lingüística. Llame al [phone].    We comply with applicable federal civil rights laws and [place] laws. We do not discriminate on the basis of race, color, national origin, age, disability, sex, sexual orientation, or gender identity.

## 2018-08-09 NOTE — MR AVS SNAPSHOT
After Visit Summary   2018    Jose J Marley    MRN: [id number]           Patient Information     Date Of Birth          1970        Visit Information        Provider Department      2018 1:00 PM Rn, [facility] Preoperative Assessment Center        Care Instructions    Preparing for Your Surgery      Name:  Jose J Marley   MRN:  [id number]   :  1970   Today's Date:  2018     Arriving for surgery:  Surgery date:  To be called with information  Arrival time:  To be called with information  Please come to:     To be called with information    What can I eat or drink?  -  You may have solid food or milk products until 8 hours prior to your surgery.  -  You may have water, apple juice or 7up/Sprite until 2 hours prior to your surgery.    Which medicines can I take?  Stop Aspirin, vitamins and supplements one week prior to surgery.  Hold Ibuprofen and Naproxen for 24 hours prior to surgery.   -  Do NOT take these medications in the morning, the day of surgery:  Lopid and lisinopril if normally taken in the morning    -  Please take these medications the day of surgery:    Metoprolol if normally taken in the morning    How do I prepare myself?  -  Take two showers: one the night before surgery; and one the morning of surgery.         Use Scrubcare or Hibiclens to wash from neck down.  You may use your own     shampoo and conditioner. No other hair products.   -  Do NOT use lotion, powder, deodorant, or antiperspirant the day of your surgery.  -  Do NOT wear any makeup, fingernail polish or jewelry.  -  Begin using Incentive Spirometer 1 week prior to surgery.  Use 4 times per day, up    to 5 breaths each time.  Bring Incentive Spirometer to hospital.  - Do not bring your own medications to the hospital, except for inhalers and eye   drops.  -  Bring your ID and insurance card.    Questions or Concerns:  -If you have questions or concerns regarding the day of surgery,  please call [phone].     -If you are scheduled at the Ambulatory Surgery Center please call [phone].    -For questions after surgery please call your surgeons office.                     Follow-ups after your visit        Your next 10 appointments already scheduled     Aug 09, 2018  1:00 PM CDT   (Arrive by 12:45 PM)   PAC RN ASSESSMENT with Aftab Pac Rn   [facility] Preoperative Assessment Center ([facility] [place])    [address] [facility] [address] [phone]            Aug 09, 2018  1:30 PM CDT   (Arrive by 1:15 PM)   PAC Anesthesia Consult with Aftab Pac Anesthesiologist   [facility] Preoperative Assessment Center ([facility] [place])    [address] [facility] [address] [phone]              Future tests that were ordered for you today     Open Future Orders        Priority Expected Expires Ordered    Basic metabolic panel Routine 8/9/2018 9/8/2018 8/9/2018    CBC with platelets Routine 8/9/2018 9/8/2018 8/9/2018    UA reflex to Microscopic and Culture Routine 8/9/2018 9/8/2018 8/9/2018            Who to contact     Please call your clinic at [phone] to:    Ask questions about your health    Make or cancel appointments    Discuss your medicines    Learn about your test results    Speak to your doctor            Additional Information About Your Visit        [employer] Information     [employer] gives you secure access to your electronic health record. If you see a primary care provider, you can also send messages to your care team and make appointments. If you have questions, please call your primary care clinic.  If you do not have a primary care provider, please call [phone] and they will assist you.      [employer] is an electronic gateway that provides easy, online access to your medical records. With [employer], you can request a clinic appointment, read your test results, renew a prescription or communicate  with your care team.     To access your existing account, please contact your [facility] or call [phone] for assistance.        Care EveryWhere ID     This is your Care EveryWhere ID. This could be used by other organizations to access your [place] medical records  [id number]         Blood Pressure from Last 3 Encounters:   08/09/18 144/86   08/09/18 (!) 155/92   04/03/18 138/86    Weight from Last 3 Encounters:   08/09/18 110.2 kg (243 lb)   08/09/18 106.5 kg (234 lb 14.4 oz)   08/09/18 110.6 kg (243 lb 14.4 oz)              Today, you had the following     No orders found for display       Primary Care Provider Office Phone # Fax #    R Arnold Crockett [phone]       [facility] [place] [address]        Equal Access to Services     [facility] : Hadii aad ku hadasho Sobubba, waaxda luqadaha, qaybta kaalmada adeegyada, nidia correia . So [facility] [phone].    ATENCIÓN: Si habla español, tiene a paul disposición servicios gratuitos de asistencia lingüística. Llame al [phone].    We comply with applicable federal civil rights laws and [place] laws. We do not discriminate on the basis of race, color, national origin, age, disability, sex, sexual orientation, or gender identity.            Thank you!     Thank you for choosing [facility] PREOPERATIVE ASSESSMENT [facility]ENTER  for your care. Our goal is always to provide you with excellent care. Hearing back from our patients is one way we can continue to improve our services. Please take a few minutes to complete the written survey that you may receive in the mail after your visit with us. Thank you!             Your Updated Medication List - Protect others around you: Learn how to safely use, store and throw away your medicines at www.disposemymeds.org.          This list is accurate as of 8/9/18 12:48 PM.  Always use your most recent med list.                   Brand Name  Dispense Instructions for use Diagnosis    aspirin 81 MG tablet      Take  by mouth daily.        atorvastatin 10 MG tablet    LIPITOR    90 tablet    Take 1 tablet by mouth  daily    Pure hypercholesterolemia       fish oil-omega-3 fatty acids 1000 MG capsule      Take 1 g by mouth daily        GARLIC      daily.        gemfibrozil 600 MG tablet    LOPID    180 tablet    Take 1 tablet by mouth two  times daily    Elevated triglycerides with high cholesterol       IBUPROFEN PO      Take 800 mg by mouth every 6 hours as needed for moderate pain        lisinopril 20 MG tablet    PRINIVIL/ZESTRIL    120 tablet    Take 1 tablet (20 mg) by mouth 2 times daily    Essential hypertension, Pre-diabetes       metoprolol succinate 100 MG 24 hr tablet    TOPROL-XL    90 tablet    TAKE 1 TABLET BY MOUTH  DAILY    Essential hypertension       MULTIVITAMIN PO      Take  by mouth daily.

## 2018-08-09 NOTE — PATIENT INSTRUCTIONS
Goals:   1. Follow the bariatric post-op diet advancement schedule:  - Bariatric clear liquid diet through post-op day 1 (while in the hospital).  - Bariatric low-fat full liquid diet on post-op days 2 through 13 (2 weeks).  2. Sip on 48-64 oz (or greater) fluids daily, recording intake to help stay on-track.  - Drink at least 2 oz of fluid every 30 min.    Follow up with RD one week after surgery    Raissa Martines, SENG, LD  If you need to schedule or reschedule with a dietitian please call [phone] or [phone].

## 2018-08-09 NOTE — NURSING NOTE
This patient is having Sleeve by Dr. Sandy.    The following handouts were reviewed with the patient :  Before Your Surgery, Patient Checklist, Weight Loss Surgery Pre-operative Class, Preop Recommendations Quick Reference Guide, History and Physical, Medications to Avoid, Shower or Bathing Before Surgery, Bowel Preparation, Powerful Choices and [place] Advance Health Care Directive.  Questions were addressed and understanding of content was verbalized.  Contact information was provided.    Patient goal weight: 245  Weight today: 234      Patient advised to call Davon for surgery date/pac

## 2018-08-09 NOTE — ANESTHESIA PREPROCEDURE EVALUATION
Anesthesia Evaluation     . Pt has had prior anesthetic. Type: General    No history of anesthetic complications          ROS/MED HX    ENT/Pulmonary:     (+)sleep apnea, uses CPAP 7 cmH2O , . .    Neurologic:  - neg neurologic ROS     Cardiovascular:     (+) Dyslipidemia, hypertension----. Taking blood thinners : Instructions Given to patient: stop aspirin 7 days prior to surgery. . . :. . Previous cardiac testing date:results:Stress Testdate:2015 results:IMPRESSION:  1. No evidence of myocardial ischemia.  2. Normal left ventricular function.ECG reviewed date:4/2018 results:NSR date: results:          METS/Exercise Tolerance:  >4 METS   Hematologic:  - neg hematologic  ROS       Musculoskeletal:  - neg musculoskeletal ROS       GI/Hepatic:  - neg GI/hepatic ROS       Renal/Genitourinary:  - ROS Renal section negative       Endo:     (+) Obesity, Other Endocrine Disorder prediabetes.      Psychiatric:  - neg psychiatric ROS       Infectious Disease:  - neg infectious disease ROS       Malignancy:      - no malignancy   Other:    (+) no H/O Chronic Pain,                   Physical Exam  Normal systems: cardiovascular, pulmonary and dental    Airway   Mallampati: II  TM distance: >3 FB  Neck ROM: full    Dental     Cardiovascular   Rhythm and rate: regular and normal      Pulmonary    breath sounds clear to auscultation               PAC Discussion and Assessment    ASA Classification: 3  Case is suitable for: Wesley  Anesthetic techniques and relevant risks discussed: GA  Invasive monitoring and risk discussed:   Types:   Possibility and Risk of blood transfusion discussed:   NPO instructions given:   Additional anesthetic preparation and risks discussed:   Needs early admission to pre-op area:   Other:     PAC Resident/NP Anesthesia Assessment:  Jose J Marley is a 48 year old not yet scheduled for an unspecified bariatric surgery on TBD by Dr. Sandy in treatment of obesity.   PAC referral for risk  assessment and optimization for anesthesia with comorbid conditions of: hyperlipidemia, hypertension, sleep apnea and prediabetes.    Pre-operative considerations:  1.  Cardiac:  Functional status- METS >4.  He is walking laps at work for exercise throughout the day.  He does at least a mile.  Hypertension is managed with lisinopril; hold DOS.  Intermediate risk surgery with 0.4% risk of major adverse cardiac event.   2.  Pulm:  Airway feasible.  He has known sleep apnea and uses his CPAP as directed.   3.  GI:  Risk of PONV score = 2.  If > 2, anti-emetic intervention recommended.    VTE risk: 0.5%    Patient is optimized and is acceptable candidate for the proposed procedure.  No further diagnostic evaluation is needed.     **For further details of assessment, testing, and physical exam please see H and P completed on same date.          Naa Buitrago DNP, RN, APRN      Reviewed and Signed by PAC Mid-Level Provider/Resident  Mid-Level Provider/Resident: Naa Buitrago DNP, RN, APRN  Date: 8/9/2018  Time: 1245    Attending Anesthesiologist Anesthesia Assessment:        Anesthesiologist:   Date:   Time:   Pass/Fail:   Disposition:     PAC Pharmacist Assessment:        Pharmacist:   Date:   Time:                           .

## 2018-08-09 NOTE — MR AVS SNAPSHOT
After Visit Summary   8/9/2018    Jose J Marley    MRN: [id number]           Patient Information     Date Of Birth          1970        Visit Information        Provider Department      8/9/2018 11:20 AM Santosh Sandy MD [facility] Surgical Weight Management        Today's Diagnoses     Obesity due to excess calories    -  1      Care Instructions    It was a pleasure meeting with you today.     Thank you for allowing us the privilege of caring for you. We hope we provided you with the excellent service you deserve.     Please let us know if there is anything else we can do for you so that we can be sure you are leaving completely satisfied with your care experience.      You saw Dr Sandy today.     Instructions per today's visit:     1) call dennise for surgery date      To schedule appointments with our team, please call [phone] option #1    Please call during clinic hours Monday through Friday 8:00a - 4:00p if you have questions or you can contact us via [employer] at anytime.      Nurses: [phone]  Fax: [phone]  Surgery Scheduler: [phone]    Please call the hospital at [phone] to speak with our on call MDs if you have urgent needs after hours, during weekends, or holidays.    *For patients who are currently enrolled in our program and have not yet had weight loss surgery please note*:    You must be at your required goal weight at the time of your Anesthesia clinic visit as well as the day of surgery or your surgery will be cancelled. You will not be able to obtain a new surgery date until you have met your goal weight.    Weight loss medications before and after surgery protocol:    Adipex (phentermine): Stop two days before surgery. Do not restart after surgery. May reconsider restarting as needed in the future.    Topimax (topiramate): Can take right up to surgery including morning of surgery and restart post op day #1.    Qsymia (phentermine/topiramate): Hold  morning of surgery. Restart after surgery post op day #1    Contrave (bupropion/naltrxone): Fast taper before surgery. 1 tablet twice daily for 1 week and then discontinue 4 days before surgery.  Will reconsider restarting at postop appt once no longer taking narcotic pain meds.  Will slowly ramp up again.    Naltrexone: Stop 4 days before surgery.  Will reconsider starting again at postop appts when no longer taking narcotic pain meds.    Belviq (locaserin): Stop 2 days before surgery and restart immediately after surgery    Victoza(liraglutide)/Saxenda(liraglutide 3mg)/Trulicity (dulaglutide) (Any GLP-1): Can take up to surgery date and restart immediately after surgery.    Main follow-up parameters for all bariatric patients     Patients who have undergone Bariatric surgery:    1. Follow-up interval during the first year: ~1 week, 1 month, 3 month, 6 month,12 months.  Every 6 months until 5 years; and then annually thereafter.  The goal of follow-up sessions is to ensure that food intake behavior (choice of food and eating speed) and exercise/activity level are set appropriately.    2. Yearly lab tests ordered by our clinic.      3. The bariatric team should be aware and potentially evaluate all adverse gastrointestinal symptoms (dysphagia, abdominal pain, nausea, vomiting, diarrhea, heartburn, reflux, etc), which can be a sign of complication.  The bariatric surgeons perform general surgery procedures in addition to bariatric surgery (laparoscopic cholecystectomy, etc.)    4. Inability to tolerate textured food (chicken, steak, fish, eggs, beans) is NOT normal and may need to be evaluated by endoscopy performed by the bariatric surgeon.                        Follow-ups after your visit        Additional Services     PAC Visit Referral (For [facility] Only)       Does this visit require an Anesthesia consult?  Yes - Evaluate for medical necessity related to one of the following conditions:  Morbid Obesity    H&P done  by:  PAC      Please be aware that coverage of these services is subject to the terms and limitations of your health insurance plan.  Call member services at your health plan with any benefit or coverage questions.      Please bring the following to your appointment:  >>   Any x-rays, CTs or MRIs which have been performed.  Contact the facility where they were done to arrange for  prior to your scheduled appointment.  Any new CT, MRI or other procedures ordered by your specialist must be performed at a [place] facility or coordinated by your clinic's referral office.    >>   List of current medications  >>   This referral request   >>   Any documents/labs given to you for this referral                  Your next 10 appointments already scheduled     Aug 09, 2018 12:00 PM CDT   (Arrive by 11:45 AM)   PAC EVALUATION with TOBY Elise CNP   [facility] Preoperative Assessment [place] ([facility])    [address] [place] [address] [phone]            Aug 09, 2018  1:00 PM CDT   (Arrive by 12:45 PM)   PAC RN ASSESSMENT with Aftab Pac Rn   [facility] Preoperative Assessment [place] ([facility])    [address] [place] [id number] [phone]            Aug 09, 2018  1:30 PM CDT   (Arrive by 1:15 PM)   PAC Anesthesia Consult with Aftab Pac Anesthesiologist   [facility] Preoperative Assessment [place] ([facility])    [address] [place] [address] [phone]              Who to contact     Please call your clinic at [phone] to:    Ask questions about your health    Make or cancel appointments    Discuss your medicines    Learn about your test results    Speak to your doctor            Additional Information About Your Visit        MyChart Information     [employer] gives you secure access to your electronic health record. If you see a primary care  "provider, you can also send messages to your care team and make appointments. If you have questions, please call your primary care clinic.  If you do not have a primary care provider, please call [phone] and they will assist you.      [employer] is an electronic gateway that provides easy, online access to your medical records. With [employer], you can request a clinic appointment, read your test results, renew a prescription or communicate with your care team.     To access your existing account, please contact your [facility] or call [phone] for assistance.        Care EveryWhere ID     This is your Care EveryWhere ID. This could be used by other organizations to access your [facility] medical records  [id number]        Your Vitals Were     Pulse Temperature Height Pulse Oximetry BMI (Body Mass Index)       62 98.4  F (36.9  C) (Oral) 5' 6\" 97% 37.91 kg/m2        Blood Pressure from Last 3 Encounters:   08/09/18 (!) 155/92   04/03/18 138/86   03/04/18 169/93    Weight from Last 3 Encounters:   08/09/18 234 lb 14.4 oz   07/20/18 241 lb   06/12/18 245 lb 8 oz              We Performed the Following     PAC Visit Referral (For [facility] Only)     Mireya-Operative Worksheet - Laparoscopic Sleeve Gastrectomy        Primary Care Provider Office Phone # Fax #    R Arnold Crockett [phone]       [address] [place] [address]        Equal Access to Services     FRANKLIN ZAMAN : Hadii aad ku hadasho Sorobiali, waaxda luqadaha, qaybta kaalmada thuyyajahaira, nidia kemp. So [facility] [phone].    ATENCIÓN: Si habla español, tiene a paul disposición servicios gratuitos de asistencia lingüística. Llame al [phone].    We comply with applicable federal civil rights laws and [place] laws. We do not discriminate on the basis of race, color, national origin, age, disability, sex, sexual orientation, or gender identity.            Thank you!     Thank " you for choosing [facility] SURGICAL WEIGHT MANAGEMENT  for your care. Our goal is always to provide you with excellent care. Hearing back from our patients is one way we can continue to improve our services. Please take a few minutes to complete the written survey that you may receive in the mail after your visit with us. Thank you!             Your Updated Medication List - Protect others around you: Learn how to safely use, store and throw away your medicines at www.disposemymeds.org.          This list is accurate as of 8/9/18 11:46 AM.  Always use your most recent med list.                   Brand Name Dispense Instructions for use Diagnosis    aspirin 81 MG tablet      Take  by mouth daily.        atorvastatin 10 MG tablet    LIPITOR    90 tablet    Take 1 tablet by mouth  daily    Pure hypercholesterolemia       fish oil-omega-3 fatty acids 1000 MG capsule      Take 1 g by mouth daily        GARLIC      daily.        gemfibrozil 600 MG tablet    LOPID    180 tablet    Take 1 tablet by mouth two  times daily    Elevated triglycerides with high cholesterol       IBUPROFEN PO      Take 800 mg by mouth every 6 hours as needed for moderate pain        lisinopril 20 MG tablet    PRINIVIL/ZESTRIL    120 tablet    Take 1 tablet (20 mg) by mouth 2 times daily    Essential hypertension, Pre-diabetes       metoprolol succinate 100 MG 24 hr tablet    TOPROL-XL    90 tablet    TAKE 1 TABLET BY MOUTH  DAILY    Essential hypertension       MULTIVITAMIN PO      Take  by mouth daily.

## 2018-08-09 NOTE — NURSING NOTE
"(   Chief Complaint   Patient presents with     Weight Check     Pre-bariatric surgery visit.     )    ( Weight: 234 lb 14.4 oz )  ( Height: 5' 6\" )  ( BMI (Calculated): 37.99 )  (   )  (   )  (   )  (   )  (   )  (   )    ( BP: (!) 155/92 )  (   )  ( Temp: 98.4  F (36.9  C) )  ( Temp src: Oral )  ( Pulse: 62 )  (   )  ( SpO2: 97 % )    (   Patient Active Problem List   Diagnosis     Preventative health care     Essential hypertension     Mixed hyperlipidemia     ZEB (obstructive sleep apnea)     Morbid obesity (H)     ACP (advance care planning)     Family history of premature CAD    )  (   Current Outpatient Prescriptions   Medication Sig Dispense Refill     aspirin 81 MG tablet Take  by mouth daily.       atorvastatin (LIPITOR) 10 MG tablet Take 1 tablet by mouth  daily 90 tablet 3     fish oil-omega-3 fatty acids (FISH OIL) 1000 MG capsule Take 1 g by mouth daily        GARLIC daily.       gemfibrozil (LOPID) 600 MG tablet Take 1 tablet by mouth two  times daily 180 tablet 3     IBUPROFEN PO Take 800 mg by mouth every 6 hours as needed for moderate pain       lisinopril (PRINIVIL/ZESTRIL) 20 MG tablet Take 1 tablet (20 mg) by mouth 2 times daily 120 tablet 3     metoprolol succinate (TOPROL-XL) 100 MG 24 hr tablet TAKE 1 TABLET BY MOUTH  DAILY 90 tablet 3     Multiple Vitamins-Minerals (MULTIVITAMIN OR) Take  by mouth daily.      )  ( Diabetes Eval:    )    ( Pain Eval:  No Pain (0) )    ( Wound Eval:       )    (   History   Smoking Status     Never Smoker   Smokeless Tobacco     Never Used    )    ( Signed By:  Britany Turpin; August 9, 2018; 10:56 AM )    "

## 2018-08-09 NOTE — LETTER
"8/9/2018       RE: Jose J Marley  [address] [place] [address]     Dear Colleague,    Thank you for referring your patient, Jose J Marley, to the [facility] SURGICAL WEIGHT MANAGEMENT at [facility]. Please see a copy of my visit note below.      Referring provider:       2/7/2018   Who referred you? KRISTINE BOYD     I was asked to see the patient regarding obesity by the referring provider above.    I had the pleasure of meeting with your patient Jose J Marley in our weight loss surgery office.  This patient is a 48 year old male who has been undergoing our thorough preoperative screening process in anticipation of potential bariatric surgery.    At initial evaluation we recorded Jose J Marley's height of 5' 5.5\", a weight of 255 lbs 11.2 oz, and calculated Body mass index of 41.9 kg/(m^2).  The patient has been unsuccessful with other methods of permanent weight loss and suffers from multiple weight related medical conditions.  Due to lack of success in achieving weight loss through other methods, he is interested in undergoing bariatric surgery.    Main illnesses of obesity include prediabetes (probably type 2 diabetes since A1c was 6.5% in January), hypertension, obstructive sleep apnea, and hypercholesterolemia.      PREVIOUS WEIGHT LOSS ATTEMPTS:     2/7/2018   I have tried the following methods to lose weight Exercise, Atkins type diet (low carb/high protein), Prescription Medications       CO-MORBIDITIES OF OBESITY INCLUDE:     2/7/2018   I have the following co-morbidities associated with obesity: Pre-Diabetes, High Blood Pressure, High Cholesterol, Sleep Apnea   Do you use a CPAP? Yes       VITALS:  BP (!) 155/92 (BP Location: Left arm, Patient Position: Sitting, Cuff Size: Adult Regular)  Pulse 62  Temp 98.4  F (36.9  C) (Oral)  Ht 5' 6\"  Wt 234 lb 14.4 oz  SpO2 97%  BMI 37.91 kg/m2    PE:  GENERAL: Alert and oriented x3. NAD  HEENT exam: " Sclerae not icteric. Hearing good. Head normocephalic and atraumatic.   CARDIOVASCULAR: No JVD  RESPIRATORY: Breathing unlabored  GASTROINTESTINAL: Obese  LOWER EXTREMITIES: no deformities  MUSCULOSKELETAL: Normal gait, Moves all 4 extremities equal and strong  NEUROLOGIC: no gross defect  SKIN: warm and dry to touch     In summary, he has undergone an appropriate medical evaluation, dietitian evaluation, as well as psychologic screening. The patient appears to be an appropriate candidate for bariatric surgery.    In the office today, I discussed the laparoscopic gastric sleeve surgery.  Risks, benefits and anticipated outcomes were outlined including the risk of death, staple line leak (1-2%), PE, DVT, ulcer, worsening GERD, N/V, stricture, hernia, wound infection, weight regain, and vitamin deficiencies. This patient has a good chance of sustaining permanent weight loss due to this procedure.  This should also allow improvement if not resolution of his/her weight related medical conditions.    At present we are going to present your patient's file for prior authorization to insurance.  Pending prior authorization, I anticipate a surgical date in the near future.  We will keep you updated on any progress.  If you have questions regarding care please feel free to contact me.  Total time spent in the clinic was 20 minutes with greater than 50% in face-to-face consultation.      Again, thank you for allowing me to participate in the care of your patient.      Sincerely,    Santosh Sandy MD

## 2018-08-09 NOTE — PATIENT INSTRUCTIONS
Preparing for Your Surgery      Name:  Jose J Marley   MRN:  [id number]   :  1970   Today's Date:  2018     Arriving for surgery:  Surgery date:  To be called with information  Arrival time:  To be called with information  Please come to:     To be called with information    What can I eat or drink?  -  You may have solid food or milk products until 8 hours prior to your surgery.  -  You may have water, apple juice or 7up/Sprite until 2 hours prior to your surgery.    Which medicines can I take?  Stop Aspirin, vitamins and supplements one week prior to surgery.  Hold Ibuprofen and Naproxen for 24 hours prior to surgery.   -  Do NOT take these medications in the morning, the day of surgery:  Lopid and lisinopril if normally taken in the morning    -  Please take these medications the day of surgery:    Metoprolol if normally taken in the morning    How do I prepare myself?  -  Take two showers: one the night before surgery; and one the morning of surgery.         Use Scrubcare or Hibiclens to wash from neck down.  You may use your own     shampoo and conditioner. No other hair products.   -  Do NOT use lotion, powder, deodorant, or antiperspirant the day of your surgery.  -  Do NOT wear any makeup, fingernail polish or jewelry.  -  Begin using Incentive Spirometer 1 week prior to surgery.  Use 4 times per day, up    to 5 breaths each time.  Bring Incentive Spirometer to hospital.  - Do not bring your own medications to the hospital, except for inhalers and eye   drops.  -  Bring your ID and insurance card.    Questions or Concerns:  -If you have questions or concerns regarding the day of surgery, please call [phone].     -If you are scheduled at the Ambulatory Surgery Center please call [phone].    -For questions after surgery please call your surgeons office.

## 2018-08-09 NOTE — PROGRESS NOTES
Eloisa Lux,    Your test results are attached.  All of your labs are good for surgery.        Naa Buitrago DNP, RN, ANP-C

## 2018-08-11 DIAGNOSIS — R73.03 PRE-DIABETES: ICD-10-CM

## 2018-08-11 DIAGNOSIS — I10 ESSENTIAL HYPERTENSION: ICD-10-CM

## 2018-08-12 ENCOUNTER — HOSPITAL ENCOUNTER (EMERGENCY)
Facility: HOSPITAL | Age: 48
Discharge: HOME | End: 2018-08-12
Attending: PHYSICIAN ASSISTANT | Admitting: PHYSICIAN ASSISTANT
Payer: COMMERCIAL

## 2018-08-12 ENCOUNTER — APPOINTMENT (OUTPATIENT)
Dept: GENERAL RADIOLOGY | Facility: HOSPITAL | Age: 48
End: 2018-08-12
Attending: PHYSICIAN ASSISTANT
Payer: COMMERCIAL

## 2018-08-12 VITALS
DIASTOLIC BLOOD PRESSURE: 84 MMHG | OXYGEN SATURATION: 100 % | SYSTOLIC BLOOD PRESSURE: 157 MMHG | TEMPERATURE: 98.2 F | RESPIRATION RATE: 16 BRPM

## 2018-08-12 DIAGNOSIS — S46.812A TRAPEZIUS STRAIN, LEFT, INITIAL ENCOUNTER: ICD-10-CM

## 2018-08-12 DIAGNOSIS — S46.012A ROTATOR CUFF STRAIN, LEFT, INITIAL ENCOUNTER: ICD-10-CM

## 2018-08-12 PROCEDURE — G0463 HOSPITAL OUTPT CLINIC VISIT: HCPCS

## 2018-08-12 PROCEDURE — 99213 OFFICE O/P EST LOW 20 MIN: CPT | Performed by: PHYSICIAN ASSISTANT

## 2018-08-12 PROCEDURE — 73030 X-RAY EXAM OF SHOULDER: CPT | Mod: TC,LT

## 2018-08-12 RX ORDER — CYCLOBENZAPRINE HCL 10 MG
TABLET ORAL
Qty: 20 TABLET | Refills: 0 | Status: SHIPPED | OUTPATIENT
Start: 2018-08-12 | End: 2019-06-25

## 2018-08-12 RX ORDER — PREDNISONE 20 MG/1
TABLET ORAL
Qty: 10 TABLET | Refills: 0 | Status: ON HOLD | OUTPATIENT
Start: 2018-08-12 | End: 2018-08-28

## 2018-08-12 ASSESSMENT — ENCOUNTER SYMPTOMS
CARDIOVASCULAR NEGATIVE: 1
MYALGIAS: 1
NEUROLOGICAL NEGATIVE: 1
PSYCHIATRIC NEGATIVE: 1
CONSTITUTIONAL NEGATIVE: 1
NECK PAIN: 0
NECK STIFFNESS: 1

## 2018-08-12 NOTE — ED PROVIDER NOTES
"  History     Chief Complaint   Patient presents with     Shoulder Injury     hurt shoulder jumping of jet ski and \"pulled something\"     The history is provided by the patient. No  was used.     Jose J Marley is a 48 year old male who has left posterior neck pain and left posterior shoulder pain x 1.5 weeks. States it started after jumping off of a jet ski. He feels he \"pulled something.\" denies direct injury     Problem List:    Patient Active Problem List    Diagnosis Date Noted     Family history of premature CAD 04/03/2018     Priority: Medium     ACP (advance care planning) 11/15/2016     Priority: Medium     Advance Care Planning 11/15/2016: ACP Review of Chart / Resources Provided:  Reviewed chart for advance care plan.  Jose J Marley has no plan or code status on file however states presence of ACP document. Copy requested. Confirmed code status reflects current choices pending receipt of document/advance care plan review.  Confirmed/documented legally designated decision makers.  Added by TOMI MEANS             Morbid obesity (H) 12/02/2015     Priority: Medium     ZEB (obstructive sleep apnea) 11/30/2015     Priority: Medium     Preventative health care 04/04/2013     Priority: Medium     Mixed hyperlipidemia 08/04/2008     Priority: Medium     Essential hypertension 12/20/2006     Priority: Medium     Problem list name updated by automated process. Provider to review          Past Medical History:    Past Medical History:   Diagnosis Date     Obesity, unspecified 01/01/2011     Prediabetes      Pure hypercholesterolemia 08/04/2008     Sleep apnea      Unspecified essential hypertension 12/20/2006       Past Surgical History:    Past Surgical History:   Procedure Laterality Date     EXCISE MASS FINGER Right 4/21/2016    Procedure: EXCISE MASS FINGER;  Surgeon: Biaml Mitchell MD;  Location: [facility]     HERNIA REPAIR  2011    umbilical     HERNIA REPAIR  1972    " inguinal       Family History:    Family History   Problem Relation Age of Onset     Myocardial Infarction Maternal Grandmother 67     cause of death     Hypertension Mother      Hyperlipidemia Mother      Myocardial Infarction Father 45     Coronary Artery Disease Early Onset Father      Hypertension Paternal Half-Brother        Social History:  Marital Status:   [2]  Social History   Substance Use Topics     Smoking status: Never Smoker     Smokeless tobacco: Never Used     Alcohol use Yes      Comment: Rarely         Medications:      aspirin 81 MG tablet   atorvastatin (LIPITOR) 10 MG tablet   cyclobenzaprine (FLEXERIL) 10 MG tablet   fish oil-omega-3 fatty acids (FISH OIL) 1000 MG capsule   GARLIC   gemfibrozil (LOPID) 600 MG tablet   IBUPROFEN PO   lisinopril (PRINIVIL/ZESTRIL) 20 MG tablet   metoprolol succinate (TOPROL-XL) 100 MG 24 hr tablet   Multiple Vitamins-Minerals (MULTIVITAMIN OR)   predniSONE (DELTASONE) 20 MG tablet         Review of Systems   Constitutional: Negative.    Cardiovascular: Negative.    Musculoskeletal: Positive for myalgias and neck stiffness. Negative for neck pain.   Neurological: Negative.    Psychiatric/Behavioral: Negative.        Physical Exam   BP: 157/84  Heart Rate: 68  Temp: 98.2  F (36.8  C)  Resp: 16  SpO2: 100 %      Physical Exam   Constitutional: He is oriented to person, place, and time. He appears well-developed and well-nourished. No distress.   Neck: Neck supple.   Left posterior neck muscles have moderate TTP. No e/e/e/e. +AFROM with pain, 4/5 strength with pain.   Cardiovascular: Normal rate.    Pulmonary/Chest: Effort normal.   Musculoskeletal:   Left trapezius and supraspinatus muscles have moderate TTP. No e/e/e/e. M/n/v intact. +AFROM w/ pain   Neurological: He is alert and oriented to person, place, and time.   Skin: He is not diaphoretic.   Psychiatric: He has a normal mood and affect.   Nursing note and vitals reviewed.      ED Course     ED Course      Procedures            Results for orders placed or performed during the hospital encounter of 08/12/18 (from the past 24 hour(s))   XR Shoulder Left G/E 3 Views    Narrative    EXAM:XR SHOULDER LT G/E 3 VW     CLINICAL HISTORY: Patient Age  48 years Additional clinical info:  pain;      COMPARISON: None      TECHNIQUE: 4 views      Impression    IMPRESSION: Tiny bony excrescence arising from the inferomedial aspect  of the coracoid. This could be due to ligamentous injury. The left  shoulder is otherwise normal    JUSTINA KING MD         Assessments & Plan (with Medical Decision Making)     I have reviewed the nursing notes.    I have reviewed the findings, diagnosis, plan and need for follow up with the patient.      Discharge Medication List as of 8/12/2018 12:14 PM      START taking these medications    Details   cyclobenzaprine (FLEXERIL) 10 MG tablet Take half to one tablet every 8 hours as needed for muscle pain, not before driving, Disp-20 tablet, R-0, E-Prescribe      predniSONE (DELTASONE) 20 MG tablet Take two tablets (= 40mg) each day for 5 (five) days, Disp-10 tablet, R-0, E-Prescribe             Final diagnoses:   Trapezius strain, left, initial encounter   Rotator cuff strain, left, initial encounter           Patient verbally educated and given appropriate education sheets for the diagnoses and has no questions.  Take medications as directed.   Follow up with your Primary Care provider if symptoms increase or if further concerns develop, return to the ER  Safia Marcus Certified  Physician Assistant  8/12/2018  3:38 PM  URGENT CARE CLINIC      8/12/2018   [facility]     Safia Marcus PA  08/12/18 1966

## 2018-08-12 NOTE — ED AVS SNAPSHOT
[facility]    [address] [address]    Phone:  [phone]                                       Jose J Marley   MRN: [id number]    Department:  [facility]   Date of Visit:  8/12/2018           After Visit Summary Signature Page     I have received my discharge instructions, and my questions have been answered. I have discussed any challenges I see with this plan with the nurse or doctor.    ..........................................................................................................................................  Patient/Patient Representative Signature      ..........................................................................................................................................  Patient Representative Print Name and Relationship to Patient    ..................................................               ................................................  Date                                            Time    ..........................................................................................................................................  Reviewed by Signature/Title    ...................................................              ..............................................  Date                                                            Time

## 2018-08-12 NOTE — ED AVS SNAPSHOT
[facility]    [address] [address]    Phone:  [phone]                                       Jose J Marley   MRN: [id number]    Department:  [facility]   Date of Visit:  8/12/2018           Patient Information     Date Of Birth          1970        Your diagnoses for this visit were:     Trapezius strain, left, initial encounter     Rotator cuff strain, left, initial encounter        You were seen by Safia Marcus PA.      Follow-up Information     Follow up with KRISTINE Crockett MD.    Specialty:  Family Practice    Why:  If symptoms worsen    Contact information:    [address]  [facility] [address] [phone]          Follow up with [facility].    Specialty:  EMERGENCY MEDICINE    Why:  if further concerns develop    Contact information:    [address] [address] [phone]    Additional information:    From [place]: Take [address]. Turn left at [address]. Turn left at the first stoplight on [address]. At the first stop sign, take a right onto [address]. Take a left into the parking lot and continue through until you reach the North enterance of the building.       From [place]: Take [address]. Take the [address] ramp towards [place]. Turn left onto [address]. Take a slight right onto [address]. Turn left at the first stoplight on [address]. At the first stop sign, take a right onto [address]. Take a left into the parking lot and continue through until you reach the North enterance of the building.       From [place]: Take [address]. Take a right at [address]. At the first stop sign, take a right onto [address]. Take a left into the parking lot and continue through until you reach the North enterance of the building.       Discharge References/Attachments     MUSCLE STRAIN, EXTREMITY (ENGLISH)     EXTERNAL ROTATION, SHOULDER (FLEXIBILITY) (ENGLISH)      Your next 10 appointments already scheduled     Aug 28, 2018   Procedure with Santosh Sandy MD   [facility], [place], Same Day Surgery (--)    [address]  [place] [address] [phone]                 Review of your medicines      START taking        Dose / Directions Last dose taken    cyclobenzaprine 10 MG tablet   Commonly known as:  FLEXERIL   Quantity:  20 tablet        Take half to one tablet every 8 hours as needed for muscle pain, not before driving   Refills:  0        predniSONE 20 MG tablet   Commonly known as:  DELTASONE   Quantity:  10 tablet        Take two tablets (= 40mg) each day for 5 (five) days   Refills:  0          Our records show that you are taking the medicines listed below. If these are incorrect, please call your family doctor or clinic.        Dose / Directions Last dose taken    aspirin 81 MG tablet        Take  by mouth daily.   Refills:  0        atorvastatin 10 MG tablet   Commonly known as:  LIPITOR   Quantity:  90 tablet        Take 1 tablet by mouth  daily   Refills:  3        fish oil-omega-3 fatty acids 1000 MG capsule   Dose:  1 g        Take 1 g by mouth daily   Refills:  0        GARLIC        daily.   Refills:  0        gemfibrozil 600 MG tablet   Commonly known as:  LOPID   Quantity:  180 tablet        Take 1 tablet by mouth two  times daily   Refills:  3        IBUPROFEN PO   Dose:  800 mg        Take 800 mg by mouth every 6 hours as needed for moderate pain   Refills:  0        lisinopril 20 MG tablet   Commonly known as:  PRINIVIL/ZESTRIL   Dose:  20 mg   Quantity:  120 tablet        Take 1 tablet (20 mg) by mouth 2 times daily   Refills:  3        metoprolol succinate 100 MG 24 hr tablet   Commonly known as:  TOPROL-XL   Quantity:  90 tablet        TAKE 1 TABLET BY MOUTH  DAILY   Refills:  3        MULTIVITAMIN PO        Take  by mouth daily.   Refills:  0                Prescriptions were sent or  printed at these locations (2 Prescriptions)                   [id number] - [place], [place] - [address] AT [facility] [address]   [address], [place] [address]    Telephone:  [phone]   Fax:  [phone]   Hours:                  E-Prescribed (2 of 2)         cyclobenzaprine (FLEXERIL) 10 MG tablet               predniSONE (DELTASONE) 20 MG tablet                Procedures and tests performed during your visit     XR Shoulder Left G/E 3 Views      Orders Needing Specimen Collection     None      Pending Results     No orders found from 8/10/2018 to 8/13/2018.            Pending Culture Results     No orders found from 8/10/2018 to 8/13/2018.            Thank you for choosing [facility]       Thank you for choosing [facility] for your care. Our goal is always to provide you with excellent care. Hearing back from our patients is one way we can continue to improve our services. Please take a few minutes to complete the written survey that you may receive in the mail after you visit with us. Thank you!        [employer] Information     [employer] gives you secure access to your electronic health record. If you see a primary care provider, you can also send messages to your care team and make appointments. If you have questions, please call your primary care clinic.  If you do not have a primary care provider, please call [phone] and they will assist you.        Care EveryWhere ID     This is your Care EveryWhere ID. This could be used by other organizations to access your [facility] medical records  [id number]        Equal Access to Services     SUKHJINDER ZAMAN AH: Hadii victor m Rider, waaxda luqadaha, qaybta kaalmada parth, nidia guevara agapitodre kemp. So [facility] [phone].    ATENCIÓN: Si habla español, tiene a paul disposición servicios gratuitos de asistencia lingüística. Llame al [phone].    We comply with applicable federal civil rights laws and [place] laws.  We do not discriminate on the basis of race, color, national origin, age, disability, sex, sexual orientation, or gender identity.            After Visit Summary       This is your record. Keep this with you and show to your community pharmacist(s) and doctor(s) at your next visit.

## 2018-08-12 NOTE — ED TRIAGE NOTES
Pt reports to triage c/o left shoulder and neck pain.  Pt injured shoulder two weeks ago jumping of jet ski.  Has been taking ibuprofen.  Cms intact.  Pt denies chest pain or sob

## 2018-08-12 NOTE — ED NOTES
Patient presents with pain in Lt shoulder up into neck area.  Patient states he pulled something X 2 weeks ago and it is getting worse.

## 2018-08-13 RX ORDER — LISINOPRIL 20 MG/1
TABLET ORAL
Qty: 180 TABLET | Refills: 1 | Status: SHIPPED | OUTPATIENT
Start: 2018-08-13 | End: 2019-05-22

## 2018-08-27 ENCOUNTER — ANESTHESIA EVENT (OUTPATIENT)
Dept: SURGERY | Facility: CLINIC | Age: 48
DRG: 621 | End: 2018-08-27
Payer: COMMERCIAL

## 2018-08-27 NOTE — ANESTHESIA PREPROCEDURE EVALUATION
Anesthesia Evaluation     . Pt has had prior anesthetic. Type: General    No history of anesthetic complications          ROS/MED HX    ENT/Pulmonary:     (+)sleep apnea, ZEB risk factors hypertension, obese, doesn't use CPAP , . .    Neurologic:  - neg neurologic ROS     Cardiovascular:     (+) hypertension-range: on lisinopril, ---. Taking blood thinners : Instructions Given to patient: on apirin. . . :. .       METS/Exercise Tolerance:  >4 METS   Hematologic:  - neg hematologic  ROS       Musculoskeletal:         GI/Hepatic:  - neg GI/hepatic ROS       Renal/Genitourinary:         Endo:     (+) Obesity, .      Psychiatric:  - neg psychiatric ROS       Infectious Disease:  - neg infectious disease ROS       Malignancy:      - no malignancy   Other:                                    Anesthesia Plan      History & Physical Review      ASA Status:  3 .    NPO Status:  > 8 hours    Plan for General with Intravenous induction. Maintenance will be Balanced.    PONV prophylaxis:  Ondansetron (or other 5HT-3) and Dexamethasone or Solumedrol  Additional equipment: Videolaryngoscope and 2nd IV      Postoperative Care  Postoperative pain management:  Multi-modal analgesia.      Consents                          .

## 2018-08-28 ENCOUNTER — ANESTHESIA (OUTPATIENT)
Dept: SURGERY | Facility: CLINIC | Age: 48
DRG: 621 | End: 2018-08-28
Payer: COMMERCIAL

## 2018-08-28 ENCOUNTER — HOSPITAL ENCOUNTER (INPATIENT)
Facility: CLINIC | Age: 48
LOS: 2 days | Discharge: HOME | DRG: 621 | End: 2018-08-30
Attending: SURGERY | Admitting: SURGERY
Payer: COMMERCIAL

## 2018-08-28 DIAGNOSIS — I10 ESSENTIAL HYPERTENSION: ICD-10-CM

## 2018-08-28 DIAGNOSIS — E66.01 MORBID OBESITY (H): Primary | ICD-10-CM

## 2018-08-28 LAB
CREAT SERPL-MCNC: 0.68 MG/DL (ref 0.66–1.25)
CREAT SERPL-MCNC: 0.8 MG/DL (ref 0.66–1.25)
GFR SERPL CREATININE-BSD FRML MDRD: >90 ML/MIN/1.7M2
GFR SERPL CREATININE-BSD FRML MDRD: >90 ML/MIN/1.7M2
GLUCOSE BLDC GLUCOMTR-MCNC: 106 MG/DL (ref 70–99)
PLATELET # BLD AUTO: 272 10E9/L (ref 150–450)
POTASSIUM SERPL-SCNC: 4 MMOL/L (ref 3.4–5.3)

## 2018-08-28 PROCEDURE — 25000132 ZZH RX MED GY IP 250 OP 250 PS 637: Performed by: SURGERY

## 2018-08-28 PROCEDURE — 82565 ASSAY OF CREATININE: CPT | Performed by: ANESTHESIOLOGY

## 2018-08-28 PROCEDURE — 25000125 ZZHC RX 250: Performed by: STUDENT IN AN ORGANIZED HEALTH CARE EDUCATION/TRAINING PROGRAM

## 2018-08-28 PROCEDURE — 25000128 H RX IP 250 OP 636: Performed by: STUDENT IN AN ORGANIZED HEALTH CARE EDUCATION/TRAINING PROGRAM

## 2018-08-28 PROCEDURE — 25000128 H RX IP 250 OP 636: Performed by: PHYSICIAN ASSISTANT

## 2018-08-28 PROCEDURE — 27210794 ZZH OR GENERAL SUPPLY STERILE: Performed by: SURGERY

## 2018-08-28 PROCEDURE — 82565 ASSAY OF CREATININE: CPT | Performed by: SURGERY

## 2018-08-28 PROCEDURE — 36000062 ZZH SURGERY LEVEL 4 1ST 30 MIN - UMMC: Performed by: SURGERY

## 2018-08-28 PROCEDURE — C9399 UNCLASSIFIED DRUGS OR BIOLOG: HCPCS | Performed by: STUDENT IN AN ORGANIZED HEALTH CARE EDUCATION/TRAINING PROGRAM

## 2018-08-28 PROCEDURE — 37000008 ZZH ANESTHESIA TECHNICAL FEE, 1ST 30 MIN: Performed by: SURGERY

## 2018-08-28 PROCEDURE — 25000566 ZZH SEVOFLURANE, EA 15 MIN: Performed by: SURGERY

## 2018-08-28 PROCEDURE — 25000128 H RX IP 250 OP 636: Performed by: SURGERY

## 2018-08-28 PROCEDURE — 25000125 ZZHC RX 250: Performed by: SURGERY

## 2018-08-28 PROCEDURE — 00000146 ZZHCL STATISTIC GLUCOSE BY METER IP

## 2018-08-28 PROCEDURE — 37000009 ZZH ANESTHESIA TECHNICAL FEE, EACH ADDTL 15 MIN: Performed by: SURGERY

## 2018-08-28 PROCEDURE — 36000064 ZZH SURGERY LEVEL 4 EA 15 ADDTL MIN - UMMC: Performed by: SURGERY

## 2018-08-28 PROCEDURE — 88305 TISSUE EXAM BY PATHOLOGIST: CPT | Performed by: SURGERY

## 2018-08-28 PROCEDURE — 12000008 ZZH R&B INTERMEDIATE UMMC

## 2018-08-28 PROCEDURE — 25000128 H RX IP 250 OP 636: Performed by: ANESTHESIOLOGY

## 2018-08-28 PROCEDURE — 40000170 ZZH STATISTIC PRE-PROCEDURE ASSESSMENT II: Performed by: SURGERY

## 2018-08-28 PROCEDURE — 71000015 ZZH RECOVERY PHASE 1 LEVEL 2 EA ADDTL HR: Performed by: SURGERY

## 2018-08-28 PROCEDURE — 85049 AUTOMATED PLATELET COUNT: CPT | Performed by: SURGERY

## 2018-08-28 PROCEDURE — 40000275 ZZH STATISTIC RCP TIME EA 10 MIN: Performed by: OPTOMETRIST

## 2018-08-28 PROCEDURE — 71000014 ZZH RECOVERY PHASE 1 LEVEL 2 FIRST HR: Performed by: SURGERY

## 2018-08-28 PROCEDURE — 0DB64Z3 EXCISION OF STOMACH, PERCUTANEOUS ENDOSCOPIC APPROACH, VERTICAL: ICD-10-PCS | Performed by: SURGERY

## 2018-08-28 PROCEDURE — 25000132 ZZH RX MED GY IP 250 OP 250 PS 637: Performed by: ANESTHESIOLOGY

## 2018-08-28 PROCEDURE — 94660 CPAP INITIATION&MGMT: CPT | Performed by: OPTOMETRIST

## 2018-08-28 PROCEDURE — 36415 COLL VENOUS BLD VENIPUNCTURE: CPT | Performed by: SURGERY

## 2018-08-28 PROCEDURE — 84132 ASSAY OF SERUM POTASSIUM: CPT | Performed by: ANESTHESIOLOGY

## 2018-08-28 PROCEDURE — 36415 COLL VENOUS BLD VENIPUNCTURE: CPT | Performed by: ANESTHESIOLOGY

## 2018-08-28 RX ORDER — SODIUM CHLORIDE, SODIUM LACTATE, POTASSIUM CHLORIDE, CALCIUM CHLORIDE 600; 310; 30; 20 MG/100ML; MG/100ML; MG/100ML; MG/100ML
INJECTION, SOLUTION INTRAVENOUS CONTINUOUS
Status: DISCONTINUED | OUTPATIENT
Start: 2018-08-28 | End: 2018-08-28

## 2018-08-28 RX ORDER — FENTANYL CITRATE 50 UG/ML
25-50 INJECTION, SOLUTION INTRAMUSCULAR; INTRAVENOUS
Status: DISCONTINUED | OUTPATIENT
Start: 2018-08-28 | End: 2018-08-28 | Stop reason: HOSPADM

## 2018-08-28 RX ORDER — PROCHLORPERAZINE MALEATE 5 MG
10 TABLET ORAL EVERY 6 HOURS PRN
Status: DISCONTINUED | OUTPATIENT
Start: 2018-08-28 | End: 2018-08-30 | Stop reason: HOSPADM

## 2018-08-28 RX ORDER — METOCLOPRAMIDE 10 MG/1
10 TABLET ORAL EVERY 6 HOURS PRN
Status: DISCONTINUED | OUTPATIENT
Start: 2018-08-28 | End: 2018-08-30 | Stop reason: HOSPADM

## 2018-08-28 RX ORDER — ONDANSETRON 4 MG/1
4 TABLET, ORALLY DISINTEGRATING ORAL EVERY 30 MIN PRN
Status: DISCONTINUED | OUTPATIENT
Start: 2018-08-28 | End: 2018-08-28 | Stop reason: HOSPADM

## 2018-08-28 RX ORDER — MORPHINE SULFATE 2 MG/ML
2-4 INJECTION, SOLUTION INTRAMUSCULAR; INTRAVENOUS
Status: DISCONTINUED | OUTPATIENT
Start: 2018-08-28 | End: 2018-08-30 | Stop reason: HOSPADM

## 2018-08-28 RX ORDER — HYDRALAZINE HYDROCHLORIDE 20 MG/ML
5 INJECTION INTRAMUSCULAR; INTRAVENOUS ONCE
Status: COMPLETED | OUTPATIENT
Start: 2018-08-28 | End: 2018-08-28

## 2018-08-28 RX ORDER — METOPROLOL SUCCINATE 100 MG/1
100 TABLET, EXTENDED RELEASE ORAL DAILY
Status: DISCONTINUED | OUTPATIENT
Start: 2018-08-28 | End: 2018-08-28

## 2018-08-28 RX ORDER — HYDROMORPHONE HYDROCHLORIDE 1 MG/ML
.3-.5 INJECTION, SOLUTION INTRAMUSCULAR; INTRAVENOUS; SUBCUTANEOUS EVERY 5 MIN PRN
Status: DISCONTINUED | OUTPATIENT
Start: 2018-08-28 | End: 2018-08-28 | Stop reason: HOSPADM

## 2018-08-28 RX ORDER — AMOXICILLIN 250 MG
1 CAPSULE ORAL 2 TIMES DAILY
Status: DISCONTINUED | OUTPATIENT
Start: 2018-08-28 | End: 2018-08-30 | Stop reason: HOSPADM

## 2018-08-28 RX ORDER — LIDOCAINE 40 MG/G
CREAM TOPICAL
Status: DISCONTINUED | OUTPATIENT
Start: 2018-08-28 | End: 2018-08-30 | Stop reason: HOSPADM

## 2018-08-28 RX ORDER — METOPROLOL TARTRATE 50 MG
50 TABLET ORAL 2 TIMES DAILY
Status: DISCONTINUED | OUTPATIENT
Start: 2018-08-28 | End: 2018-08-28

## 2018-08-28 RX ORDER — NALOXONE HYDROCHLORIDE 0.4 MG/ML
.1-.4 INJECTION, SOLUTION INTRAMUSCULAR; INTRAVENOUS; SUBCUTANEOUS
Status: ACTIVE | OUTPATIENT
Start: 2018-08-28 | End: 2018-08-29

## 2018-08-28 RX ORDER — ONDANSETRON 2 MG/ML
4 INJECTION INTRAMUSCULAR; INTRAVENOUS EVERY 30 MIN PRN
Status: DISCONTINUED | OUTPATIENT
Start: 2018-08-28 | End: 2018-08-28 | Stop reason: HOSPADM

## 2018-08-28 RX ORDER — LIDOCAINE 40 MG/G
CREAM TOPICAL
Status: DISCONTINUED | OUTPATIENT
Start: 2018-08-28 | End: 2018-08-28 | Stop reason: HOSPADM

## 2018-08-28 RX ORDER — PROPOFOL 10 MG/ML
INJECTION, EMULSION INTRAVENOUS PRN
Status: DISCONTINUED | OUTPATIENT
Start: 2018-08-28 | End: 2018-08-28

## 2018-08-28 RX ORDER — NALOXONE HYDROCHLORIDE 0.4 MG/ML
.1-.4 INJECTION, SOLUTION INTRAMUSCULAR; INTRAVENOUS; SUBCUTANEOUS
Status: DISCONTINUED | OUTPATIENT
Start: 2018-08-28 | End: 2018-08-30 | Stop reason: HOSPADM

## 2018-08-28 RX ORDER — LABETALOL HYDROCHLORIDE 5 MG/ML
10 INJECTION, SOLUTION INTRAVENOUS
Status: DISCONTINUED | OUTPATIENT
Start: 2018-08-28 | End: 2018-08-28 | Stop reason: HOSPADM

## 2018-08-28 RX ORDER — ONDANSETRON 2 MG/ML
INJECTION INTRAMUSCULAR; INTRAVENOUS PRN
Status: DISCONTINUED | OUTPATIENT
Start: 2018-08-28 | End: 2018-08-28

## 2018-08-28 RX ORDER — METHOCARBAMOL 750 MG/1
750 TABLET, FILM COATED ORAL 4 TIMES DAILY PRN
Status: DISCONTINUED | OUTPATIENT
Start: 2018-08-28 | End: 2018-08-28

## 2018-08-28 RX ORDER — OXYCODONE HYDROCHLORIDE 5 MG/1
5 TABLET ORAL EVERY 4 HOURS PRN
Qty: 20 TABLET | Refills: 0 | Status: SHIPPED | OUTPATIENT
Start: 2018-08-28 | End: 2018-09-13

## 2018-08-28 RX ORDER — ACETAMINOPHEN 325 MG/1
650 TABLET ORAL EVERY 4 HOURS PRN
Qty: 100 TABLET | Refills: 1 | Status: SHIPPED | OUTPATIENT
Start: 2018-08-31 | End: 2021-10-01

## 2018-08-28 RX ORDER — LISINOPRIL 20 MG/1
20 TABLET ORAL DAILY
Status: DISCONTINUED | OUTPATIENT
Start: 2018-08-28 | End: 2018-08-29

## 2018-08-28 RX ORDER — ACETAMINOPHEN 325 MG/1
650 TABLET ORAL EVERY 4 HOURS PRN
Status: DISCONTINUED | OUTPATIENT
Start: 2018-08-31 | End: 2018-08-30 | Stop reason: HOSPADM

## 2018-08-28 RX ORDER — ONDANSETRON 4 MG/1
4 TABLET, ORALLY DISINTEGRATING ORAL EVERY 6 HOURS PRN
Status: DISCONTINUED | OUTPATIENT
Start: 2018-08-28 | End: 2018-08-30 | Stop reason: HOSPADM

## 2018-08-28 RX ORDER — SODIUM CHLORIDE 9 MG/ML
INJECTION, SOLUTION INTRAVENOUS CONTINUOUS
Status: DISCONTINUED | OUTPATIENT
Start: 2018-08-28 | End: 2018-08-29

## 2018-08-28 RX ORDER — FENTANYL CITRATE 50 UG/ML
INJECTION, SOLUTION INTRAMUSCULAR; INTRAVENOUS PRN
Status: DISCONTINUED | OUTPATIENT
Start: 2018-08-28 | End: 2018-08-28

## 2018-08-28 RX ORDER — METOCLOPRAMIDE HYDROCHLORIDE 5 MG/ML
10 INJECTION INTRAMUSCULAR; INTRAVENOUS EVERY 6 HOURS PRN
Status: DISCONTINUED | OUTPATIENT
Start: 2018-08-28 | End: 2018-08-30 | Stop reason: HOSPADM

## 2018-08-28 RX ORDER — SODIUM CHLORIDE, SODIUM LACTATE, POTASSIUM CHLORIDE, CALCIUM CHLORIDE 600; 310; 30; 20 MG/100ML; MG/100ML; MG/100ML; MG/100ML
INJECTION, SOLUTION INTRAVENOUS CONTINUOUS
Status: DISCONTINUED | OUTPATIENT
Start: 2018-08-28 | End: 2018-08-30 | Stop reason: HOSPADM

## 2018-08-28 RX ORDER — BUPIVACAINE HYDROCHLORIDE 2.5 MG/ML
INJECTION, SOLUTION EPIDURAL; INFILTRATION; INTRACAUDAL PRN
Status: DISCONTINUED | OUTPATIENT
Start: 2018-08-28 | End: 2018-08-28 | Stop reason: HOSPADM

## 2018-08-28 RX ORDER — OXYCODONE HYDROCHLORIDE 5 MG/1
5-10 TABLET ORAL
Status: DISCONTINUED | OUTPATIENT
Start: 2018-08-28 | End: 2018-08-30 | Stop reason: HOSPADM

## 2018-08-28 RX ORDER — NALOXONE HYDROCHLORIDE 0.4 MG/ML
.1-.4 INJECTION, SOLUTION INTRAMUSCULAR; INTRAVENOUS; SUBCUTANEOUS
Status: DISCONTINUED | OUTPATIENT
Start: 2018-08-28 | End: 2018-08-29

## 2018-08-28 RX ORDER — AMOXICILLIN 250 MG
2 CAPSULE ORAL 2 TIMES DAILY
Status: DISCONTINUED | OUTPATIENT
Start: 2018-08-28 | End: 2018-08-30 | Stop reason: HOSPADM

## 2018-08-28 RX ORDER — ACETAMINOPHEN 325 MG/1
975 TABLET ORAL ONCE
Status: COMPLETED | OUTPATIENT
Start: 2018-08-28 | End: 2018-08-28

## 2018-08-28 RX ORDER — ACETAMINOPHEN 325 MG/1
975 TABLET ORAL EVERY 8 HOURS
Status: DISCONTINUED | OUTPATIENT
Start: 2018-08-28 | End: 2018-08-30 | Stop reason: HOSPADM

## 2018-08-28 RX ORDER — GABAPENTIN 300 MG/1
300 CAPSULE ORAL ONCE
Status: COMPLETED | OUTPATIENT
Start: 2018-08-28 | End: 2018-08-28

## 2018-08-28 RX ORDER — DEXAMETHASONE SODIUM PHOSPHATE 4 MG/ML
INJECTION, SOLUTION INTRA-ARTICULAR; INTRALESIONAL; INTRAMUSCULAR; INTRAVENOUS; SOFT TISSUE PRN
Status: DISCONTINUED | OUTPATIENT
Start: 2018-08-28 | End: 2018-08-28

## 2018-08-28 RX ORDER — KETOROLAC TROMETHAMINE 30 MG/ML
30 INJECTION, SOLUTION INTRAMUSCULAR; INTRAVENOUS EVERY 6 HOURS
Status: COMPLETED | OUTPATIENT
Start: 2018-08-28 | End: 2018-08-29

## 2018-08-28 RX ORDER — HYOSCYAMINE SULFATE 0.125 MG
0.125-0.25 TABLET ORAL EVERY 4 HOURS PRN
Qty: 60 TABLET | Refills: 1 | Status: SHIPPED | OUTPATIENT
Start: 2018-08-28 | End: 2021-10-01

## 2018-08-28 RX ORDER — CEFAZOLIN SODIUM 2 G/100ML
2 INJECTION, SOLUTION INTRAVENOUS
Status: COMPLETED | OUTPATIENT
Start: 2018-08-28 | End: 2018-08-28

## 2018-08-28 RX ORDER — ONDANSETRON 4 MG/1
4 TABLET, ORALLY DISINTEGRATING ORAL EVERY 4 HOURS PRN
Qty: 30 TABLET | Refills: 0 | Status: SHIPPED | OUTPATIENT
Start: 2018-08-28 | End: 2018-09-13

## 2018-08-28 RX ORDER — CEFAZOLIN SODIUM 1 G/3ML
1 INJECTION, POWDER, FOR SOLUTION INTRAMUSCULAR; INTRAVENOUS SEE ADMIN INSTRUCTIONS
Status: DISCONTINUED | OUTPATIENT
Start: 2018-08-28 | End: 2018-08-28 | Stop reason: HOSPADM

## 2018-08-28 RX ORDER — ONDANSETRON 2 MG/ML
4 INJECTION INTRAMUSCULAR; INTRAVENOUS EVERY 6 HOURS PRN
Status: DISCONTINUED | OUTPATIENT
Start: 2018-08-28 | End: 2018-08-30 | Stop reason: HOSPADM

## 2018-08-28 RX ORDER — LIDOCAINE HYDROCHLORIDE 20 MG/ML
INJECTION, SOLUTION INFILTRATION; PERINEURAL PRN
Status: DISCONTINUED | OUTPATIENT
Start: 2018-08-28 | End: 2018-08-28

## 2018-08-28 RX ORDER — CYCLOBENZAPRINE HCL 5 MG
5 TABLET ORAL 3 TIMES DAILY PRN
Status: DISCONTINUED | OUTPATIENT
Start: 2018-08-28 | End: 2018-08-30 | Stop reason: HOSPADM

## 2018-08-28 RX ORDER — SODIUM CHLORIDE, SODIUM LACTATE, POTASSIUM CHLORIDE, CALCIUM CHLORIDE 600; 310; 30; 20 MG/100ML; MG/100ML; MG/100ML; MG/100ML
INJECTION, SOLUTION INTRAVENOUS CONTINUOUS
Status: DISCONTINUED | OUTPATIENT
Start: 2018-08-28 | End: 2018-08-28 | Stop reason: HOSPADM

## 2018-08-28 RX ADMIN — Medication 0.3 MG: at 12:48

## 2018-08-28 RX ADMIN — DEXAMETHASONE SODIUM PHOSPHATE 8 MG: 4 INJECTION, SOLUTION INTRA-ARTICULAR; INTRALESIONAL; INTRAMUSCULAR; INTRAVENOUS; SOFT TISSUE at 10:54

## 2018-08-28 RX ADMIN — FENTANYL CITRATE 25 MCG: 50 INJECTION INTRAMUSCULAR; INTRAVENOUS at 12:14

## 2018-08-28 RX ADMIN — MIDAZOLAM 2 MG: 1 INJECTION INTRAMUSCULAR; INTRAVENOUS at 10:30

## 2018-08-28 RX ADMIN — LIDOCAINE HYDROCHLORIDE 100 MG: 20 INJECTION, SOLUTION INFILTRATION; PERINEURAL at 10:45

## 2018-08-28 RX ADMIN — PROPOFOL 40 MG: 10 INJECTION, EMULSION INTRAVENOUS at 11:09

## 2018-08-28 RX ADMIN — ONDANSETRON 4 MG: 2 INJECTION INTRAMUSCULAR; INTRAVENOUS at 10:45

## 2018-08-28 RX ADMIN — ACETAMINOPHEN 975 MG: 325 TABLET, FILM COATED ORAL at 08:56

## 2018-08-28 RX ADMIN — FENTANYL CITRATE 25 MCG: 50 INJECTION INTRAMUSCULAR; INTRAVENOUS at 12:37

## 2018-08-28 RX ADMIN — SODIUM CHLORIDE, POTASSIUM CHLORIDE, SODIUM LACTATE AND CALCIUM CHLORIDE: 600; 310; 30; 20 INJECTION, SOLUTION INTRAVENOUS at 23:03

## 2018-08-28 RX ADMIN — PROPOFOL 200 MG: 10 INJECTION, EMULSION INTRAVENOUS at 10:46

## 2018-08-28 RX ADMIN — Medication 0.4 MG: at 13:23

## 2018-08-28 RX ADMIN — GABAPENTIN 300 MG: 300 CAPSULE ORAL at 08:56

## 2018-08-28 RX ADMIN — FENTANYL CITRATE 50 MCG: 50 INJECTION, SOLUTION INTRAMUSCULAR; INTRAVENOUS at 11:13

## 2018-08-28 RX ADMIN — METOPROLOL TARTRATE 50 MG: 100 TABLET, FILM COATED ORAL at 22:58

## 2018-08-28 RX ADMIN — ROCURONIUM BROMIDE 100 MG: 10 INJECTION INTRAVENOUS at 10:48

## 2018-08-28 RX ADMIN — MORPHINE SULFATE 2 MG: 2 INJECTION, SOLUTION INTRAMUSCULAR; INTRAVENOUS at 18:35

## 2018-08-28 RX ADMIN — HYDROMORPHONE HYDROCHLORIDE 0.2 MG: 10 INJECTION, SOLUTION INTRAMUSCULAR; INTRAVENOUS; SUBCUTANEOUS at 12:15

## 2018-08-28 RX ADMIN — KETOROLAC TROMETHAMINE 30 MG: 30 INJECTION, SOLUTION INTRAMUSCULAR at 13:32

## 2018-08-28 RX ADMIN — Medication 0.3 MG: at 13:06

## 2018-08-28 RX ADMIN — SODIUM CHLORIDE, POTASSIUM CHLORIDE, SODIUM LACTATE AND CALCIUM CHLORIDE: 600; 310; 30; 20 INJECTION, SOLUTION INTRAVENOUS at 10:44

## 2018-08-28 RX ADMIN — FENTANYL CITRATE 25 MCG: 50 INJECTION INTRAMUSCULAR; INTRAVENOUS at 12:25

## 2018-08-28 RX ADMIN — CEFAZOLIN SODIUM 2 G: 2 INJECTION, SOLUTION INTRAVENOUS at 10:54

## 2018-08-28 RX ADMIN — MORPHINE SULFATE 2 MG: 2 INJECTION, SOLUTION INTRAMUSCULAR; INTRAVENOUS at 15:32

## 2018-08-28 RX ADMIN — ACETAMINOPHEN 975 MG: 325 TABLET, FILM COATED ORAL at 15:32

## 2018-08-28 RX ADMIN — HYDROMORPHONE HYDROCHLORIDE 0.3 MG: 10 INJECTION, SOLUTION INTRAMUSCULAR; INTRAVENOUS; SUBCUTANEOUS at 11:59

## 2018-08-28 RX ADMIN — ACETAMINOPHEN 975 MG: 325 TABLET, FILM COATED ORAL at 23:01

## 2018-08-28 RX ADMIN — SUGAMMADEX 200 MG: 100 INJECTION, SOLUTION INTRAVENOUS at 11:47

## 2018-08-28 RX ADMIN — FENTANYL CITRATE 100 MCG: 50 INJECTION, SOLUTION INTRAMUSCULAR; INTRAVENOUS at 10:44

## 2018-08-28 RX ADMIN — FENTANYL CITRATE 25 MCG: 50 INJECTION INTRAMUSCULAR; INTRAVENOUS at 12:19

## 2018-08-28 RX ADMIN — KETOROLAC TROMETHAMINE 30 MG: 30 INJECTION, SOLUTION INTRAMUSCULAR at 20:07

## 2018-08-28 RX ADMIN — FENTANYL CITRATE 25 MCG: 50 INJECTION INTRAMUSCULAR; INTRAVENOUS at 12:42

## 2018-08-28 RX ADMIN — FENTANYL CITRATE 25 MCG: 50 INJECTION INTRAMUSCULAR; INTRAVENOUS at 12:31

## 2018-08-28 RX ADMIN — SODIUM CHLORIDE, POTASSIUM CHLORIDE, SODIUM LACTATE AND CALCIUM CHLORIDE: 600; 310; 30; 20 INJECTION, SOLUTION INTRAVENOUS at 13:00

## 2018-08-28 RX ADMIN — HYDRALAZINE HYDROCHLORIDE 5 MG: 20 INJECTION INTRAMUSCULAR; INTRAVENOUS at 13:20

## 2018-08-28 RX ADMIN — FENTANYL CITRATE 50 MCG: 50 INJECTION, SOLUTION INTRAMUSCULAR; INTRAVENOUS at 11:27

## 2018-08-28 RX ADMIN — LISINOPRIL 20 MG: 20 TABLET ORAL at 15:32

## 2018-08-28 RX ADMIN — OXYCODONE HYDROCHLORIDE 5 MG: 5 TABLET ORAL at 23:06

## 2018-08-28 RX ADMIN — ENOXAPARIN SODIUM 40 MG: 40 INJECTION SUBCUTANEOUS at 08:56

## 2018-08-28 ASSESSMENT — ACTIVITIES OF DAILY LIVING (ADL)
ADLS_ACUITY_SCORE: 10
ADLS_ACUITY_SCORE: 10

## 2018-08-28 ASSESSMENT — PAIN DESCRIPTION - DESCRIPTORS
DESCRIPTORS: PATIENT UNABLE TO DESCRIBE

## 2018-08-28 NOTE — IP AVS SNAPSHOT
[address] [address]    Phone:  [phone]                                       After Visit Summary   8/28/2018    Jose J Marley    MRN: [id number]           After Visit Summary Signature Page     I have received my discharge instructions, and my questions have been answered. I have discussed any challenges I see with this plan with the nurse or doctor.    ..........................................................................................................................................  Patient/Patient Representative Signature      ..........................................................................................................................................  Patient Representative Print Name and Relationship to Patient    ..................................................               ................................................  Date                                            Time    ..........................................................................................................................................  Reviewed by Signature/Title    ...................................................              ..............................................  Date                                                            Time          22EPIC Rev 08/18

## 2018-08-28 NOTE — PHARMACY-CONSULT NOTE
Bariatric Consult    Medications evaluated as requested per Bariatric Consult.     The following changes have been made based on the Bariatric Medication Management Policy. Metoprolol XL 100mg daily change to metoprolol 50mg BID    The pharmacist will continue to follow as new medications are ordered.

## 2018-08-28 NOTE — IP AVS SNAPSHOT
MRN:[id number]                      After Visit Summary   8/28/2018    Jose J Marley    MRN: [id number]           Thank you!     Thank you for choosing [facility] for your care. Our goal is always to provide you with excellent care. Hearing back from our patients is one way we can continue to improve our services. Please take a few minutes to complete the written survey that you may receive in the mail after you visit with us. Thank you!        Patient Information     Date Of Birth          1970        Designated Caregiver       Most Recent Value    Caregiver    Will someone help with your care after discharge? yes    Name of designated caregiver aureliano wife      About your hospital stay     You were admitted on:  August 28, 2018 You last received care in the:  [address] [facility] [place]    You were discharged on:  August 30, 2018        Reason for your hospital stay       Morbid obesity (H)  (primary encounter diagnosis)                  Who to Call     For medical emergencies, please call 911.  For non-urgent questions about your medical care, please call your primary care provider or clinic, [phone]  For questions related to your surgery, please call your surgery clinic        Attending Provider     Provider Specialty    Santosh Sandy MD Surgery       Primary Care Provider Office Phone # Fax #    R Arnold Crockett [phone]      After Care Instructions     Activity       No driving or alcohol while on narcotics            Diet       Follow this diet upon discharge: post-op diet is bariatric clear liquids for 1 DAY and then advance to full liquids two days after surgery.            Discharge Instructions       Follow up  Follow-up with your surgeon team (Dr. Sandy) in 1-2 weeks. This appointment was previously made for you and is scheduled as below. If you have any questions about appointment scheduling, please call [phone] and choose option #1.    You will  receive a call from our clinic nurse after surgery.  If you have any questions and would like to speak with a nurse please call [phone] and choose option #3 to speak with a triage nurse.    Appointments located at [facility]:  [address]  [address]  [place] [address]    Diet  If surgery was with Dr Sandy: post-op diet is bariatric clear liquids for 1 DAY and then advance to full liquids two days after surgery.      Activity  - No lifting >10 pounds for 4-6 weeks. Discuss with your surgeon at follow up appointment.  - May shower starting postoperative day #1 but no scrubbing incisions. No bathing or soaking incisions for 2 weeks or until incisions completely healed.  - No driving for at least 12 hours after taking narcotic pain medication.    Wound Care  Keep clean and dry. Steri strips or glue will fall off on their own.    When to call  Call [phone] and choose option #3 to speak with a triage nurse if you are having troubles during regular business hours.  Call [phone] and ask to speak with surgery resident if you are having troubles in the evenings, at night, or on weekends.   Please call if you experience increasing abdominal pain, nausea, vomiting, increasing drainage from your wounds, chills, or fever >101.5    Medications  After surgery it is ok to swallow medications smaller than 1/4 inch (size of pencil eraser) for all procedures.  If medication is larger than 1/4 inch then it will need to be crushed, cut or in liquid form for 1-2 months after surgery. This can be discussed with surgeon team at the 1 month follow up appointment and will depend on patient tolerance.    If you still have your gallbladder you will be given a prescription called Actigall or Ursodiol in a capsule form to prevent gallstones during rapid weight loss (this will be addressed at your first follow up appointment in 1-2 weeks).  This capsule can be opened and put into your liquids or food (when  your diet progresses). You will need to take this medications for 6 months after surgery.    You should stop taking your oral diabetes medications until instructed by a doctor to restart (because you will have a greater risk of low blood sugar after your surgery).    You should stop taking any statins for high cholesterol or any vitamins/minerals until instructed by a doctor to restart.    You should not take any NSAIDs (ex: aspirin, ibuprofen, etc.) until cleared by your surgeon.    It is recommended you take over the counter Tylenol in addition to oxycodone (narcotic medication) for pain; see discharge medications for suggestion for Tylenol as this helps with overall pain control. Once you are no longer needing oxycodone for pain control, you can decrease the Tylenol dosage and frequency and then stop taking it. Do not take more than 4,000 mg of acetaminophen (Tylenol) from all sources to prevent liver damage.     It is recommended that you take stool softener while taking narcotic pain medication to prevent constipation (senna-docusate works well, or any other over-the-counter medication you would like for stool softening).                  Follow-up Appointments     Adult [facility] Follow-up and recommended labs and tests       Follow up with Dr. Sandy Bariatric surgery clinic in 1 week. Phone follow up will be arranged.    Appointments on [facility] and/or [facility] (with [facility] or [facility] provider or service). Call [phone] if you haven't heard regarding these appointments within 7 days of discharge.            Follow Up and recommended labs and tests       Follow up with primary care provider, KRISTINE Crockett, within 7 days to evaluate medication change.  No follow up labs or test are needed.                  Your next 10 appointments already scheduled     Sep 13, 2018 12:30 PM CDT   (Arrive by 12:15 PM)   Return Bariatric Nutrition Visit with SENG [facility] ([facility]  "[facility] [place])    [address] [address]   [phone]            Sep 13, 2018  1:20 PM CDT   (Arrive by 1:05 PM)   Bariatric Post Op Global Visit with Santosh Sandy MD   [facility] ([facility] [place])    [address] [address]   [phone]            Oct 04, 2018 12:30 PM CDT   (Arrive by 12:15 PM)   New Bariatric Nutrition Visit with Raissa Martines RD   [facility] ([facility])    [address] [address]   [phone]            Oct 04, 2018  1:20 PM CDT   (Arrive by 1:05 PM)   Bariatric Post Op Global Visit with Santosh Sandy MD   [facility] ([facility] [place])    [address] [address]   [phone]              Pending Results     Date and Time Order Name Status Description    8/28/2018 1146 Surgical pathology exam In process             Statement of Approval     Ordered          08/30/18 1031  I have reviewed and agree with all the recommendations and orders detailed in this document.  EFFECTIVE NOW     Approved and electronically signed by:  Camilo Barajas MD             Admission Information     Date & Time Provider Department Dept. Phone    8/28/2018 Santosh Sandy MD [address] [facility] [place] [phone]      Your Vitals Were     Blood Pressure Temperature Respirations Height Weight Pulse Oximetry    146/82 (BP Location: Right arm) 98.1  F (36.7  C) (Oral) 18 1.676 m (5' 6\") 108.6 kg (239 lb 6.7 oz) 98%    BMI (Body Mass Index)                   38.64 kg/m2           MyChart Information     [employer] gives you secure access to your electronic health record. If you see a primary care provider, you can also send messages to your care team and make appointments. If you have questions, please " call your primary care clinic.  If you do not have a primary care provider, please call [phone] and they will assist you.        Care EveryWhere ID     This is your Care EveryWhere ID. This could be used by other organizations to access your [facility] medical records  [id number]        Equal Access to Services     SUKHJINDER ZAMAN : Zohreh Rider, waaxda luqadaha, qaybta kaalmajahaira alba, nidia kemp. So [facility] [phone].    ATENCIÓN: Si habla español, tiene a paul disposición servicios gratuitos de asistencia lingüística. Llame al [phone].    We comply with applicable federal civil rights laws and [place] laws. We do not discriminate on the basis of race, color, national origin, age, disability, sex, sexual orientation, or gender identity.               Review of your medicines      START taking        Dose / Directions    acetaminophen 325 MG tablet   Commonly known as:  TYLENOL   Used for:  Morbid obesity (H)        Dose:  650 mg   Start taking on:  8/31/2018   Take 2 tablets (650 mg) by mouth every 4 hours as needed for other (multimodal surgical pain management along with NSAIDS and opioid medication as indicated based on pain control and physical function.)   Quantity:  100 tablet   Refills:  1       hyoscyamine 0.125 MG tablet   Commonly known as:  ANASPAZ/LEVSIN   Used for:  Morbid obesity (H)        Dose:  0.125-0.25 mg   Take 1-2 tablets (125-250 mcg) by mouth every 4 hours as needed for cramping   Quantity:  60 tablet   Refills:  1       metoprolol 10 mg/mL Susp   Commonly known as:  LOPRESSOR   Used for:  Essential hypertension        Dose:  50 mg   Take 5 mLs (50 mg) by mouth 2 times daily   Quantity:  400 mL   Refills:  0       omeprazole 20 MG CR capsule   Commonly known as:  priLOSEC   Used for:  Morbid obesity (H)        Dose:  20 mg   Take 1 capsule (20 mg) by mouth 2 times daily   Quantity:  20 capsule   Refills:  1       ondansetron 4 MG ODT  tab   Commonly known as:  ZOFRAN-ODT   Used for:  Morbid obesity (H)        Dose:  4 mg   Take 1 tablet (4 mg) by mouth every 4 hours as needed for nausea or vomiting   Quantity:  30 tablet   Refills:  0       oxyCODONE IR 5 MG tablet   Commonly known as:  ROXICODONE   Used for:  Morbid obesity (H)        Dose:  5 mg   Take 1 tablet (5 mg) by mouth every 4 hours as needed for breakthrough pain or other (pain control or improvement in physical function. Hold dose for analgesic side effects.)   Quantity:  20 tablet   Refills:  0         CONTINUE these medicines which have NOT CHANGED        Dose / Directions    cyclobenzaprine 10 MG tablet   Commonly known as:  FLEXERIL        Take half to one tablet every 8 hours as needed for muscle pain, not before driving   Quantity:  20 tablet   Refills:  0       lisinopril 20 MG tablet   Commonly known as:  PRINIVIL/ZESTRIL   Used for:  Essential hypertension, Pre-diabetes        TAKE 1 TABLET BY MOUTH TWO  TIMES DAILY   Quantity:  180 tablet   Refills:  1       metoprolol succinate 100 MG 24 hr tablet   Commonly known as:  TOPROL-XL   Used for:  Essential hypertension        TAKE 1 TABLET BY MOUTH  DAILY   Quantity:  90 tablet   Refills:  3         STOP taking     aspirin 81 MG tablet           atorvastatin 10 MG tablet   Commonly known as:  LIPITOR           fish oil-omega-3 fatty acids 1000 MG capsule           GARLIC           gemfibrozil 600 MG tablet   Commonly known as:  LOPID           IBUPROFEN PO           MULTIVITAMIN PO           predniSONE 20 MG tablet   Commonly known as:  DELTASONE                Where to get your medicines      These medications were sent to [facility] [place], [place] - [address]  [address] [address]     Phone:  [phone]     hyoscyamine 0.125 MG tablet    metoprolol 10 mg/mL Susp    omeprazole 20 MG CR capsule    ondansetron 4 MG ODT tab         Some of these will need a paper prescription  and others can be bought over the counter. Ask your nurse if you have questions.     Bring a paper prescription for each of these medications     acetaminophen 325 MG tablet    oxyCODONE IR 5 MG tablet                Protect others around you: Learn how to safely use, store and throw away your medicines at www.disposemymeds.org.        Information about OPIOIDS     PRESCRIPTION OPIOIDS: WHAT YOU NEED TO KNOW   We gave you an opioid (narcotic) pain medicine. It is important to manage your pain, but opioids are not always the best choice. You should first try all the other options your care team gave you. Take this medicine for as short a time (and as few doses) as possible.    Some activities can increase your pain, such as bandage changes or therapy sessions. It may help to take your pain medicine 30 to 60 minutes before these activities. Reduce your stress by getting enough sleep, working on hobbies you enjoy and practicing relaxation or meditation. Talk to your care team about ways to manage your pain beyond prescription opioids.    These medicines have risks:    DO NOT drive when on new or higher doses of pain medicine. These medicines can affect your alertness and reaction times, and you could be arrested for driving under the influence (DUI). If you need to use opioids long-term, talk to your care team about driving.    DO NOT operate heavy machinery    DO NOT do any other dangerous activities while taking these medicines.    DO NOT drink any alcohol while taking these medicines.     If the opioid prescribed includes acetaminophen, DO NOT take with any other medicines that contain acetaminophen. Read all labels carefully. Look for the word  acetaminophen  or  Tylenol.  Ask your pharmacist if you have questions or are unsure.    You can get addicted to pain medicines, especially if you have a history of addiction (chemical, alcohol or substance dependence). Talk to your care team about ways to reduce this  risk.    All opioids tend to cause constipation. Drink plenty of water and eat foods that have a lot of fiber, such as fruits, vegetables, prune juice, apple juice and high-fiber cereal. Take a laxative (Miralax, milk of magnesia, Colace, Senna) if you don t move your bowels at least every other day. Other side effects include upset stomach, sleepiness, dizziness, throwing up, tolerance (needing more of the medicine to have the same effect), physical dependence and slowed breathing.    Store your pills in a secure place, locked if possible. We will not replace any lost or stolen medicine. If you don t finish your medicine, please throw away (dispose) as directed by your pharmacist. The [place] [employer]ution [employer] has more information about safe disposal: https://www.pca.[facility].[place]/living-green/managing-unwanted-medications             Medication List: This is a list of all your medications and when to take them. Check marks below indicate your daily home schedule. Keep this list as a reference.      Medications           Morning Afternoon Evening Bedtime As Needed    acetaminophen 325 MG tablet   Commonly known as:  TYLENOL   Take 2 tablets (650 mg) by mouth every 4 hours as needed for other (multimodal surgical pain management along with NSAIDS and opioid medication as indicated based on pain control and physical function.)   Start taking on:  8/31/2018   Last time this was given:  975 mg on 8/30/2018  7:29 AM                                cyclobenzaprine 10 MG tablet   Commonly known as:  FLEXERIL   Take half to one tablet every 8 hours as needed for muscle pain, not before driving                                hyoscyamine 0.125 MG tablet   Commonly known as:  ANASPAZ/LEVSIN   Take 1-2 tablets (125-250 mcg) by mouth every 4 hours as needed for cramping                                lisinopril 20 MG tablet   Commonly known as:  PRINIVIL/ZESTRIL   TAKE 1 TABLET BY MOUTH TWO  TIMES DAILY   Last time this  was given:  20 mg on 8/30/2018  7:29 AM                                metoprolol 10 mg/mL Susp   Commonly known as:  LOPRESSOR   Take 5 mLs (50 mg) by mouth 2 times daily   Last time this was given:  50 mg on 8/30/2018  7:29 AM                                metoprolol succinate 100 MG 24 hr tablet   Commonly known as:  TOPROL-XL   TAKE 1 TABLET BY MOUTH  DAILY                                omeprazole 20 MG CR capsule   Commonly known as:  priLOSEC   Take 1 capsule (20 mg) by mouth 2 times daily                                ondansetron 4 MG ODT tab   Commonly known as:  ZOFRAN-ODT   Take 1 tablet (4 mg) by mouth every 4 hours as needed for nausea or vomiting                                oxyCODONE IR 5 MG tablet   Commonly known as:  ROXICODONE   Take 1 tablet (5 mg) by mouth every 4 hours as needed for breakthrough pain or other (pain control or improvement in physical function. Hold dose for analgesic side effects.)   Last time this was given:  5 mg on 8/29/2018  6:26 PM

## 2018-08-28 NOTE — ANESTHESIA CARE TRANSFER NOTE
Patient: Jose J Marley    Procedure(s):  Laparoscopic Sleeve Gastrectomy  - Wound Class: II-Clean Contaminated    Diagnosis: Morbid Obesity   Diagnosis Additional Information: No value filed.    Anesthesia Type:   General     Note:  Airway :Face Mask  Patient transferred to:PACU  Comments: Extubated in OR 3, transferred to PACU with oxygen, hemodynamically stable. Upon arrival to PACU, pain is moderate, no nausea. SpO2 98% on 4L O2 via face mask.     Valerie HermanHandoff Report: Identifed the Patient, Identified the Reponsible Provider, Reviewed the pertinent medical history, Discussed the surgical course, Reviewed Intra-OP anesthesia mangement and issues during anesthesia, Set expectations for post-procedure period and Allowed opportunity for questions and acknowledgement of understanding      Vitals: (Last set prior to Anesthesia Care Transfer)    CRNA VITALS  8/28/2018 1125 - 8/28/2018 1207      8/28/2018             Pulse: 74    SpO2: 98 %    Resp Rate (observed): (!)  1                Electronically Signed By: Valerie Herman MD  August 28, 2018  12:07 PM

## 2018-08-28 NOTE — ANESTHESIA POSTPROCEDURE EVALUATION
Patient: Jose J Marley    Procedure(s):  Laparoscopic Sleeve Gastrectomy  - Wound Class: II-Clean Contaminated    Diagnosis:Morbid Obesity   Diagnosis Additional Information: No value filed.    Anesthesia Type:  General    Note:  Anesthesia Post Evaluation    Patient location during evaluation: PACU  Patient participation: Able to fully participate in evaluation  Level of consciousness: awake and alert  Pain management: adequate  Airway patency: patent  Cardiovascular status: acceptable and hemodynamically stable  Respiratory status: acceptable  Hydration status: acceptable  PONV: none     Anesthetic complications: None          Last vitals:  Vitals:    08/28/18 0828 08/28/18 1202 08/28/18 1215   BP: (!) 144/91 153/90 158/84   Resp: 16 16    Temp: 36.8  C (98.2  F) 36.4  C (97.5  F) 36.5  C (97.7  F)   SpO2: 99% 100%          Electronically Signed By: Miki Rangel MD  August 28, 2018  12:29 PM

## 2018-08-28 NOTE — OP NOTE
[facility], [place]    Operative Note    Pre-operative diagnosis: Morbid Obesity    Post-operative diagnosis * No post-op diagnosis entered *   Procedure: Procedure(s):  Laparoscopic Sleeve Gastrectomy  - Wound Class: II-Clean Contaminated   Surgeon: Surgeon(s) and Role:     * Santosh Sandy MD - Primary     * Rishi Lara MD - Assisting   Assistant Surgeon  Anesthesia: Rishi Lara MD; [please note that Dr. Lara was scrubbed and assisted during the operation due to the unavailability of a qualified surgical resident.]  General    Estimated blood loss: Less than 10 ml   Drains: None   Specimens:   ID Type Source Tests Collected by Time Destination   A : Partial Gastrectomy Tissue Other SURGICAL PATHOLOGY EXAM Santosh Sandy MD 8/28/2018 11:29 AM       Findings: None.   Complications: None.   Implants: None.         BOUGIE SIZE: 40 FR  DISTANCE FROM PYLORUS: 10 CM  STAPLE LINE REINFORCEMENT: NO  STAPLE LINE OVERSEW: NO  COMORBIDITIES:   Past Medical History:   Diagnosis Date     Obesity, unspecified 01/01/2011     Prediabetes      Pure hypercholesterolemia 08/04/2008     Sleep apnea      Unspecified essential hypertension 12/20/2006       INDICATIONS FOR PROCEDURE  Jose J Marley is a 48 year old male who is morbidly obese.  Numerous weight loss attempts without surgery have been without success.     After understanding the risks and benefits of proceeding with a laparoscopic vertical sleeve gastrectomy, he agreed to an operation as outlined by me.    I reviewed the risks of surgery with Jose J Marley.    These include, but are not limited to, death, myocardial infarction, pneumonia, urinary tract infection, deep venous thrombosis with or without pulmonary embolus, abdominal infection from bowel injury or abscess, bowel obstruction, wound infection, and bleeding.    More specific risks related to vertical sleeve gastrectomy were detailed at the bariatric  "informational seminar and include the followin.) leak at the vertical sleeve staple line, 2.) stricture in the sleeve, 3.) nausea, vomiting, and dehydration for several months, 4.) adhesions causing bowel obstruction, 5.) rapid weight loss causing a higher rate of gallstone formation during the first 6 months after surgery, 6.) decreased absorption of vitamins because of the reduced stomach size, 7.) weight regain if inappropriate food intake occurs.    The BMI that we are treating this patient for was measured at the initial consultation visit in our bariatric program and it was: 41.3 kg/m2 (as calculated just below).      The initial consult height, weight, and BMI are as follows:    Height: 5' 6\"   BARIATRIC WEIGHT TRACKING 2018   Initial Weight 255 lbs 11 oz       Our weight loss surgery program requires weight loss prior to bariatric surgery and currently the height, weight, and BMI are as follows:    Height: 167.6 cm (5' 6\"), Weight: 108.6 kg (239 lb 6.7 oz), and currently the Body mass index is 38.64 kg/(m^2).    Due to the patient's comorbidity conditions of HTN and prediabetes, in association with elevated body mass index, bariatric surgery has been recommended and is being performed today.    Moreover, as the surgeon performing this procedure, I certify that the following are true in regards to this patient at or prior to the day of surgery:    1. The patient's body mass index (BMI) is or has been greater than or equal to 35 kg/m2.  2. The patient has at least one co-morbidity related to obesity (as outlined above).  3. The patient has been previously unsuccessful with medical treatment for obesity.  Please note that some of this information has been documented as part of a comprehensive pre-bariatric surgery process in the outpatient clinic and is NOT immediately available in the inpatient encounter for this bariatric operation.      OPERATIVE PROCEDURE:     Jose J Marley was brought to the " operating room and prepared in routine fashion. Under the benefits of general anesthesia, a left upper quadrant Veress needle was inserted and pneumoperitoneum was established using carbon dioxide gas to a maximum pressure of 15 mmHg. A total of five ports were placed into the abdomen.     A liver retractor was placed through the rightmost port and this provided a view of the upper stomach. The operation was started by dividing the short gastric vessels off the greater curvature of the stomach. This dissection was carried up to the angle of His, and ligasure dissector was used for hemostasis.       A bougie (size noted above) was passed into the stomach and I used 5 blue loads  of the [employer] Flex lienar cutting stapler device to create a vertical sleeve gastrectomy with the bougie as a template. The bougie was removed.    The sleeve gastrectomy specimen (partial gastrectomy) was now removed from the abdomen through the 15 mm port.     Hemostasis was secured, and the liver retractor and all ports were removed from the abdomen under direct visualization.       I was present for all critical components of the operation and all needle and sponge counts were correct x2 at the end of the procedure.    Santosh Sandy MD  Surgery  [phone] (hospital )

## 2018-08-28 NOTE — PROGRESS NOTES
SURGERY POST-OP CHECK NOTE  08/28/2018 3:26 PM    Patient: Jose J Marley  MRN: [id number]    Subjective  No acute events postoperatively. Pain well controlled. Tolerating water. No nausea, vomiting, chest pain, shortness of breath. Voided independently. Not yet ambulated.     Objective  Temp:  [96  F (35.6  C)-98.2  F (36.8  C)] 96  F (35.6  C)  Heart Rate:  [59-75] 75  Resp:  [12-25] 25  BP: (144-170)/(81-97) 164/81  SpO2:  [94 %-100 %] 94 %    General: AAOx4, NAD, lying in bed, appears comfortable  CV: Non cyanotic   Pulm: Breathing comfortably  Abd: soft, nontender to palpation, non-distended, incisions c/d/i   Extremities: warm, well-perfused without edema  Neuro: facial movement grossly symmetric, moving all extremities spontaneously without apparent deficit    Assessment/Plan  Jose J Marley is a 48 year old male POD#0 s/p Procedure(s):  Laparoscopic Sleeve Gastrectomy  - Wound Class: II-Clean Contaminated, doing well.     Camilo Barajas MD  Surgery PGY1

## 2018-08-29 PROCEDURE — 12000008 ZZH R&B INTERMEDIATE UMMC

## 2018-08-29 PROCEDURE — 25000132 ZZH RX MED GY IP 250 OP 250 PS 637: Performed by: SURGERY

## 2018-08-29 PROCEDURE — 25000125 ZZHC RX 250: Performed by: STUDENT IN AN ORGANIZED HEALTH CARE EDUCATION/TRAINING PROGRAM

## 2018-08-29 PROCEDURE — 25000132 ZZH RX MED GY IP 250 OP 250 PS 637: Performed by: STUDENT IN AN ORGANIZED HEALTH CARE EDUCATION/TRAINING PROGRAM

## 2018-08-29 PROCEDURE — 25000125 ZZHC RX 250: Performed by: SURGERY

## 2018-08-29 PROCEDURE — 25000128 H RX IP 250 OP 636: Performed by: STUDENT IN AN ORGANIZED HEALTH CARE EDUCATION/TRAINING PROGRAM

## 2018-08-29 PROCEDURE — 25000128 H RX IP 250 OP 636: Performed by: SURGERY

## 2018-08-29 RX ORDER — LISINOPRIL 20 MG/1
20 TABLET ORAL 2 TIMES DAILY
Status: DISCONTINUED | OUTPATIENT
Start: 2018-08-29 | End: 2018-08-30 | Stop reason: HOSPADM

## 2018-08-29 RX ORDER — KETOROLAC TROMETHAMINE 30 MG/ML
30 INJECTION, SOLUTION INTRAMUSCULAR; INTRAVENOUS EVERY 6 HOURS
Status: DISCONTINUED | OUTPATIENT
Start: 2018-08-29 | End: 2018-08-30 | Stop reason: HOSPADM

## 2018-08-29 RX ADMIN — LIDOCAINE HYDROCHLORIDE 30 ML: 20 SOLUTION ORAL; TOPICAL at 17:32

## 2018-08-29 RX ADMIN — OXYCODONE HYDROCHLORIDE 5 MG: 5 TABLET ORAL at 18:26

## 2018-08-29 RX ADMIN — ENOXAPARIN SODIUM 40 MG: 40 INJECTION SUBCUTANEOUS at 07:16

## 2018-08-29 RX ADMIN — METOPROLOL TARTRATE 50 MG: 100 TABLET, FILM COATED ORAL at 07:16

## 2018-08-29 RX ADMIN — KETOROLAC TROMETHAMINE 30 MG: 30 INJECTION, SOLUTION INTRAMUSCULAR at 03:12

## 2018-08-29 RX ADMIN — LISINOPRIL 20 MG: 20 TABLET ORAL at 17:32

## 2018-08-29 RX ADMIN — LISINOPRIL 20 MG: 20 TABLET ORAL at 05:17

## 2018-08-29 RX ADMIN — PANTOPRAZOLE SODIUM 40 MG: 40 INJECTION, POWDER, FOR SOLUTION INTRAVENOUS at 21:17

## 2018-08-29 RX ADMIN — ACETAMINOPHEN 975 MG: 325 TABLET, FILM COATED ORAL at 07:16

## 2018-08-29 RX ADMIN — SENNOSIDES AND DOCUSATE SODIUM 1 TABLET: 8.6; 5 TABLET ORAL at 07:47

## 2018-08-29 RX ADMIN — KETOROLAC TROMETHAMINE 30 MG: 30 INJECTION, SOLUTION INTRAMUSCULAR at 07:16

## 2018-08-29 RX ADMIN — OXYCODONE HYDROCHLORIDE 5 MG: 5 TABLET ORAL at 13:14

## 2018-08-29 RX ADMIN — METOPROLOL TARTRATE 50 MG: 100 TABLET, FILM COATED ORAL at 21:18

## 2018-08-29 RX ADMIN — KETOROLAC TROMETHAMINE 30 MG: 30 INJECTION, SOLUTION INTRAMUSCULAR at 21:17

## 2018-08-29 ASSESSMENT — ACTIVITIES OF DAILY LIVING (ADL)
ADLS_ACUITY_SCORE: 9

## 2018-08-29 NOTE — DISCHARGE SUMMARY
"[facility] Discharge Summary    Date of Admission: 8/28/2018  Date of Discharge: 8/30/2018    Admission Diagnosis:  1. Morbid Obesity    Discharge Diagnosis:  1. Same as above    Consultations:  None    Procedures:  1. Laparoscopic sleeve gastrectomy on 8/28/2018 by Dr Vika Kimball HPI:  Per chart review: Jose J Marley is a 48 year old male who is morbidly obese.  Numerous weight loss attempts without surgery have been without success.     Hospital Course:  The patient was admitted and underwent the above procedure. The patient tolerated the procedure well. There were no complications. The patient's diet was slowly advanced as bowel function returned. Pain was controlled with oral pain medication and the patient was able to ambulate and void without difficulty. The patient received appropriate education post operatively. On POD #1 the patient was feeling chest pressure/heart burns with swallowing and stayed one more night for monitoring. On POD2, chest pressure/heart burn resolved with protonix and patient educated to pills that are less than pencil eraser size. Patient expressed understanding and agreement. On the day of discharge, patient was tolerating po, pain well controlled on oral pain medication, voiding independently, and ambulating. He was discharged in stable condition on POD2.     Discharge Physical Exam:  /77  Temp 97.7  F (36.5  C)  Resp 18  Ht 1.676 m (5' 6\")  Wt 108.6 kg (239 lb 6.7 oz)  SpO2 99%  BMI 38.64 kg/m2    Gen: NAD  Lungs: non-labored breathing  CV: regular rhythm, normal rate   Abd: obese, soft, nondistended, nontender, incisions are c/d/i  Ext: no peripheral edema  Neuro: AOx3    Meds:     Review of your medicines      START taking       Dose / Directions    acetaminophen 325 MG tablet   Commonly known as:  TYLENOL   Used for:  Morbid obesity (H)        Dose:  650 mg   Start taking on:  8/31/2018   Take 2 tablets (650 mg) by " mouth every 4 hours as needed for other (multimodal surgical pain management along with NSAIDS and opioid medication as indicated based on pain control and physical function.)   Quantity:  100 tablet   Refills:  1       hyoscyamine 0.125 MG tablet   Commonly known as:  ANASPAZ/LEVSIN   Used for:  Morbid obesity (H)        Dose:  0.125-0.25 mg   Take 1-2 tablets (125-250 mcg) by mouth every 4 hours as needed for cramping   Quantity:  60 tablet   Refills:  1       metoprolol 10 mg/mL Susp   Commonly known as:  LOPRESSOR   Used for:  Essential hypertension        Dose:  50 mg   Take 5 mLs (50 mg) by mouth 2 times daily   Quantity:  400 mL   Refills:  0       omeprazole 20 MG CR capsule   Commonly known as:  priLOSEC   Used for:  Morbid obesity (H)        Dose:  20 mg   Take 1 capsule (20 mg) by mouth 2 times daily   Quantity:  20 capsule   Refills:  1       ondansetron 4 MG ODT tab   Commonly known as:  ZOFRAN-ODT   Used for:  Morbid obesity (H)        Dose:  4 mg   Take 1 tablet (4 mg) by mouth every 4 hours as needed for nausea or vomiting   Quantity:  30 tablet   Refills:  0       oxyCODONE IR 5 MG tablet   Commonly known as:  ROXICODONE   Used for:  Morbid obesity (H)        Dose:  5 mg   Take 1 tablet (5 mg) by mouth every 4 hours as needed for breakthrough pain or other (pain control or improvement in physical function. Hold dose for analgesic side effects.)   Quantity:  20 tablet   Refills:  0         CONTINUE these medicines which have NOT CHANGED       Dose / Directions    cyclobenzaprine 10 MG tablet   Commonly known as:  FLEXERIL        Take half to one tablet every 8 hours as needed for muscle pain, not before driving   Quantity:  20 tablet   Refills:  0       lisinopril 20 MG tablet   Commonly known as:  PRINIVIL/ZESTRIL   Used for:  Essential hypertension, Pre-diabetes        TAKE 1 TABLET BY MOUTH TWO  TIMES DAILY   Quantity:  180 tablet   Refills:  1       metoprolol succinate 100 MG 24 hr tablet    Commonly known as:  TOPROL-XL   Used for:  Essential hypertension        TAKE 1 TABLET BY MOUTH  DAILY   Quantity:  90 tablet   Refills:  3         STOP taking          aspirin 81 MG tablet           atorvastatin 10 MG tablet   Commonly known as:  LIPITOR           fish oil-omega-3 fatty acids 1000 MG capsule           GARLIC           gemfibrozil 600 MG tablet   Commonly known as:  LOPID           IBUPROFEN PO           MULTIVITAMIN PO           predniSONE 20 MG tablet   Commonly known as:  DELTASONE                Where to get your medicines      These medications were sent to [facility] - [place], [place] - [address]  [address] [address]     Phone:  [phone]      hyoscyamine 0.125 MG tablet     metoprolol 10 mg/mL Susp     omeprazole 20 MG CR capsule     ondansetron 4 MG ODT tab         Some of these will need a paper prescription and others can be bought over the counter. Ask your nurse if you have questions.     Bring a paper prescription for each of these medications      acetaminophen 325 MG tablet     oxyCODONE IR 5 MG tablet             Additional instructions:  After Care     Future Labs/Procedures    Activity     Comments:    No driving or alcohol while on narcotics    Diet     Comments:    Follow this diet upon discharge: post-op diet is bariatric clear liquids for 1 DAY and then advance to full liquids two days after surgery.    Discharge Instructions     Comments:    Follow up  Follow-up with your surgeon team (Dr. Sandy) in 1-2 weeks. This appointment was previously made for you and is scheduled as below. If you have any questions about appointment scheduling, please call [phone] and choose option #1.    You will receive a call from our clinic nurse after surgery.  If you have any questions and would like to speak with a nurse please call [phone] and choose option #3 to speak with a triage nurse.    Appointments located at [place]  [place] [facility]:  [address]  [place] [address]    Diet  If surgery was with Dr Sandy: post-op diet is bariatric clear liquids for 1 DAY and then advance to full liquids two days after surgery.      Activity  - No lifting >10 pounds for 4-6 weeks. Discuss with your surgeon at follow up appointment.  - May shower starting postoperative day #1 but no scrubbing incisions. No bathing or soaking incisions for 2 weeks or until incisions completely healed.  - No driving for at least 12 hours after taking narcotic pain medication.    Wound Care  Keep clean and dry. Steri strips or glue will fall off on their own.    When to call  Call [phone] and choose option #3 to speak with a triage nurse if you are having troubles during regular business hours.  Call [phone] and ask to speak with surgery resident if you are having troubles in the evenings, at night, or on weekends.   Please call if you experience increasing abdominal pain, nausea, vomiting, increasing drainage from your wounds, chills, or fever >101.5    Medications  After surgery it is ok to swallow medications smaller than 1/4 inch (size of pencil eraser) for all procedures.  If medication is larger than 1/4 inch then it will need to be crushed, cut or in liquid form for 1-2 months after surgery. This can be discussed with surgeon team at the 1 month follow up appointment and will depend on patient tolerance.    If you still have your gallbladder you will be given a prescription called Actigall or Ursodiol in a capsule form to prevent gallstones during rapid weight loss (this will be addressed at your first follow up appointment in 1-2 weeks).  This capsule can be opened and put into your liquids or food (when your diet progresses). You will need to take this medications for 6 months after surgery.    You should stop taking your oral diabetes medications until instructed by a doctor to restart (because you will have a greater risk of low  blood sugar after your surgery).    You should stop taking any statins for high cholesterol or any vitamins/minerals until instructed by a doctor to restart.    You should not take any NSAIDs (ex: aspirin, ibuprofen, etc.) until cleared by your surgeon.    It is recommended you take over the counter Tylenol in addition to oxycodone (narcotic medication) for pain; see discharge medications for suggestion for Tylenol as this helps with overall pain control. Once you are no longer needing oxycodone for pain control, you can decrease the Tylenol dosage and frequency and then stop taking it. Do not take more than 4,000 mg of acetaminophen (Tylenol) from all sources to prevent liver damage.     It is recommended that you take stool softener while taking narcotic pain medication to prevent constipation (senna-docusate works well, or any other over-the-counter medication you would like for stool softening).              Follow Up:  -Follow up with Dr Sandy in clinic in 1 week(s) after discharge. You should be called to make an appointment within 3 business days. If you are not contacted, call [phone] to make an appointment.

## 2018-08-29 NOTE — PLAN OF CARE
Problem: Patient Care Overview  Goal: Individualization & Mutuality  Outcome: No Change  Vitals:    08/28/18 1900 08/28/18 2017 08/28/18 2306 08/29/18 0316   BP:  155/87 147/82 165/84   BP Location:  Right arm Right arm Right arm   Cuff Size:       Resp:  24 25 17   Temp: 99  F (37.2  C) 99.2  F (37.3  C) 99.2  F (37.3  C) 98.9  F (37.2  C)   TempSrc: Oral Oral Oral    SpO2:  95% 100% 100%   Weight:       Height:         Per 12 hours, max temp 99.2. Encouraged deep breathing and coughing and IS, reached 2000 on IS. Lung sounds diminished in bases. Denies SOB. HR 50s-60s. /84 at 0400, notified Dr. Ramirez who ordered home dose of lisinopril 20mg BID, administered, will continue to monitor. O2 sats high 90s on 2 L/min NC. Orders to wean O2 today. On capnography, IPI 8-10. Lap sites have small dried serosanguinous drainage, marked, no further drainage noted. C/o epigastric pain during swallowing at beginning of shift, administered scheduled Toradol x2 and PRN oxycodone 5mg x1, patient stated pain is better. Occasionally tolerating sips of bariatric clears. MIVF infusing. Voiding adequate amount in urinal. BS+, denies passing flatus, no BM. Denies nausea. Up with SBA. Slept in between cares. Continue POC.

## 2018-08-29 NOTE — PLAN OF CARE
Problem: Patient Care Overview  Goal: Plan of Care/Patient Progress Review  Vitals:    08/28/18 1545 08/28/18 1615 08/28/18 1645 08/28/18 1715   BP: 154/86 167/88 (!) 182/95 158/86   BP Location: Right arm Right arm Right arm Right arm   Cuff Size:       Resp: 16 16 16 16   Temp:       TempSrc:       SpO2: 96% 98% 98%    Weight:       Height:       48 years old male came from [facility] after having lap sleeve gastrectomy, 5 lap site  primapore intact with one dry drainage on dressing,  Hypo BS, lungs clear, voided on arrival, up and ambulated down the johnson, independent and stand by, complaining pain, morphine with relief,   Taking sips, resting continue with plan of care.

## 2018-08-29 NOTE — PROGRESS NOTES
"[facility]   MIS Discharge Summary    Date of Admission: 8/28/2018  Date of Discharge: 8/29/2018    Admission Diagnosis:  1. Morbid Obesity    Discharge Diagnosis:  1. Same as above    Consultations:  None    Procedures:  1. Laparoscopic sleeve gastrectomy on 8/28/2018 by Dr Vika Kimball HPI:  Per chart review: Jose J Marley is a 48 year old male who is morbidly obese.  Numerous weight loss attempts without surgery have been without success.     Hospital Course:  The patient was admitted and underwent the above procedure. The patient tolerated the procedure well. There were no complications. The patient's diet was slowly advanced as bowel function returned. Pain was controlled with oral pain medication and the patient was able to ambulate and void without difficulty. The patient received appropriate education post operatively. On POD #1 the patient was discharged to home.    Discharge Physical Exam:  /77  Temp 97.7  F (36.5  C)  Resp 18  Ht 1.676 m (5' 6\")  Wt 108.6 kg (239 lb 6.7 oz)  SpO2 99%  BMI 38.64 kg/m2    Gen: NAD  Lungs: non-labored breathing  CV: regular rhythm, normal rate   Abd: obese, soft, nondistended, appropriately tender, incisions are c/d/i  Ext: no peripheral edema  Neuro: AOx3    Meds:     Review of your medicines      START taking       Dose / Directions    acetaminophen 325 MG tablet   Commonly known as:  TYLENOL   Used for:  Morbid obesity (H)        Dose:  650 mg   Start taking on:  8/31/2018   Take 2 tablets (650 mg) by mouth every 4 hours as needed for other (multimodal surgical pain management along with NSAIDS and opioid medication as indicated based on pain control and physical function.)   Quantity:  100 tablet   Refills:  1       hyoscyamine 0.125 MG tablet   Commonly known as:  ANASPAZ/LEVSIN   Used for:  Morbid obesity (H)        Dose:  0.125-0.25 mg   Take 1-2 tablets (125-250 mcg) by mouth every 4 hours as needed for cramping "   Quantity:  60 tablet   Refills:  1       metoprolol 10 mg/mL Susp   Commonly known as:  LOPRESSOR   Used for:  Essential hypertension        Dose:  50 mg   Take 5 mLs (50 mg) by mouth 2 times daily   Quantity:  400 mL   Refills:  0       omeprazole 20 MG CR capsule   Commonly known as:  priLOSEC   Used for:  Morbid obesity (H)        Dose:  20 mg   Take 1 capsule (20 mg) by mouth 2 times daily   Quantity:  20 capsule   Refills:  1       ondansetron 4 MG ODT tab   Commonly known as:  ZOFRAN-ODT   Used for:  Morbid obesity (H)        Dose:  4 mg   Take 1 tablet (4 mg) by mouth every 4 hours as needed for nausea or vomiting   Quantity:  30 tablet   Refills:  0       oxyCODONE IR 5 MG tablet   Commonly known as:  ROXICODONE   Used for:  Morbid obesity (H)        Dose:  5 mg   Take 1 tablet (5 mg) by mouth every 4 hours as needed for breakthrough pain or other (pain control or improvement in physical function. Hold dose for analgesic side effects.)   Quantity:  20 tablet   Refills:  0         CONTINUE these medicines which have NOT CHANGED       Dose / Directions    cyclobenzaprine 10 MG tablet   Commonly known as:  FLEXERIL        Take half to one tablet every 8 hours as needed for muscle pain, not before driving   Quantity:  20 tablet   Refills:  0       lisinopril 20 MG tablet   Commonly known as:  PRINIVIL/ZESTRIL   Used for:  Essential hypertension, Pre-diabetes        TAKE 1 TABLET BY MOUTH TWO  TIMES DAILY   Quantity:  180 tablet   Refills:  1       metoprolol succinate 100 MG 24 hr tablet   Commonly known as:  TOPROL-XL   Used for:  Essential hypertension        TAKE 1 TABLET BY MOUTH  DAILY   Quantity:  90 tablet   Refills:  3         STOP taking          aspirin 81 MG tablet           atorvastatin 10 MG tablet   Commonly known as:  LIPITOR           fish oil-omega-3 fatty acids 1000 MG capsule           GARLIC           gemfibrozil 600 MG tablet   Commonly known as:  LOPID           IBUPROFEN PO            MULTIVITAMIN PO           predniSONE 20 MG tablet   Commonly known as:  DELTASONE                Where to get your medicines      These medications were sent to [facility] - [place], [place] - [address]  [address] [facility] [address]     Phone:  [phone]      hyoscyamine 0.125 MG tablet     metoprolol 10 mg/mL Susp     omeprazole 20 MG CR capsule     ondansetron 4 MG ODT tab         Some of these will need a paper prescription and others can be bought over the counter. Ask your nurse if you have questions.     Bring a paper prescription for each of these medications      acetaminophen 325 MG tablet     oxyCODONE IR 5 MG tablet             Additional instructions:  After Care     Future Labs/Procedures    Activity     Comments:    No driving or alcohol while on narcotics    Diet     Comments:    Follow this diet upon discharge: post-op diet is bariatric clear liquids for 1 DAY and then advance to full liquids two days after surgery.    Discharge Instructions     Comments:    Follow up  Follow-up with your surgeon team (Dr. Sandy) in 1-2 weeks. This appointment was previously made for you and is scheduled as below. If you have any questions about appointment scheduling, please call [phone] and choose option #1.    You will receive a call from our clinic nurse after surgery.  If you have any questions and would like to speak with a nurse please call [phone] and choose option #3 to speak with a triage nurse.    Appointments located at [facility] clinic:  [address]  [place] [address]    Diet  If surgery was with Dr Sandy: post-op diet is bariatric clear liquids for 1 DAY and then advance to full liquids two days after surgery.      Activity  - No lifting >10 pounds for 4-6 weeks. Discuss with your surgeon at follow up appointment.  - May shower starting postoperative day #1 but no scrubbing incisions. No bathing or soaking incisions for 2  weeks or until incisions completely healed.  - No driving for at least 12 hours after taking narcotic pain medication.    Wound Care  Keep clean and dry. Steri strips or glue will fall off on their own.    When to call  Call [phone] and choose option #3 to speak with a triage nurse if you are having troubles during regular business hours.  Call [phone] and ask to speak with surgery resident if you are having troubles in the evenings, at night, or on weekends.   Please call if you experience increasing abdominal pain, nausea, vomiting, increasing drainage from your wounds, chills, or fever >101.5    Medications  After surgery it is ok to swallow medications smaller than 1/4 inch (size of pencil eraser) for all procedures.  If medication is larger than 1/4 inch then it will need to be crushed, cut or in liquid form for 1-2 months after surgery. This can be discussed with surgeon team at the 1 month follow up appointment and will depend on patient tolerance.    If you still have your gallbladder you will be given a prescription called Actigall or Ursodiol in a capsule form to prevent gallstones during rapid weight loss (this will be addressed at your first follow up appointment in 1-2 weeks).  This capsule can be opened and put into your liquids or food (when your diet progresses). You will need to take this medications for 6 months after surgery.    You should stop taking your oral diabetes medications until instructed by a doctor to restart (because you will have a greater risk of low blood sugar after your surgery).    You should stop taking any statins for high cholesterol or any vitamins/minerals until instructed by a doctor to restart.    You should not take any NSAIDs (ex: aspirin, ibuprofen, etc.) until cleared by your surgeon.    It is recommended you take over the counter Tylenol in addition to oxycodone (narcotic medication) for pain; see discharge medications for suggestion for Tylenol as this  helps with overall pain control. Once you are no longer needing oxycodone for pain control, you can decrease the Tylenol dosage and frequency and then stop taking it. Do not take more than 4,000 mg of acetaminophen (Tylenol) from all sources to prevent liver damage.     It is recommended that you take stool softener while taking narcotic pain medication to prevent constipation (senna-docusate works well, or any other over-the-counter medication you would like for stool softening).              Follow Up:  -Follow up with Dr Sandy in clinic in 1 week(s) after discharge. You should be called to make an appointment within 3 business days. If you are not contacted, call [phone] to make an appointment.

## 2018-08-30 VITALS
RESPIRATION RATE: 18 BRPM | SYSTOLIC BLOOD PRESSURE: 146 MMHG | TEMPERATURE: 98.1 F | DIASTOLIC BLOOD PRESSURE: 82 MMHG | HEIGHT: 66 IN | OXYGEN SATURATION: 98 % | BODY MASS INDEX: 38.48 KG/M2 | WEIGHT: 239.42 LBS

## 2018-08-30 LAB — COPATH REPORT: NORMAL

## 2018-08-30 PROCEDURE — 25000125 ZZHC RX 250: Performed by: SURGERY

## 2018-08-30 PROCEDURE — 25000132 ZZH RX MED GY IP 250 OP 250 PS 637: Performed by: STUDENT IN AN ORGANIZED HEALTH CARE EDUCATION/TRAINING PROGRAM

## 2018-08-30 PROCEDURE — 25000132 ZZH RX MED GY IP 250 OP 250 PS 637: Performed by: SURGERY

## 2018-08-30 PROCEDURE — 25000128 H RX IP 250 OP 636: Performed by: SURGERY

## 2018-08-30 PROCEDURE — 40000275 ZZH STATISTIC RCP TIME EA 10 MIN

## 2018-08-30 PROCEDURE — 25000128 H RX IP 250 OP 636: Performed by: STUDENT IN AN ORGANIZED HEALTH CARE EDUCATION/TRAINING PROGRAM

## 2018-08-30 RX ADMIN — LISINOPRIL 20 MG: 20 TABLET ORAL at 07:29

## 2018-08-30 RX ADMIN — METOPROLOL TARTRATE 50 MG: 100 TABLET, FILM COATED ORAL at 07:29

## 2018-08-30 RX ADMIN — KETOROLAC TROMETHAMINE 30 MG: 30 INJECTION, SOLUTION INTRAMUSCULAR at 03:06

## 2018-08-30 RX ADMIN — ENOXAPARIN SODIUM 40 MG: 40 INJECTION SUBCUTANEOUS at 07:30

## 2018-08-30 RX ADMIN — SENNOSIDES AND DOCUSATE SODIUM 1 TABLET: 8.6; 5 TABLET ORAL at 00:05

## 2018-08-30 RX ADMIN — ACETAMINOPHEN 975 MG: 325 TABLET, FILM COATED ORAL at 07:29

## 2018-08-30 RX ADMIN — PANTOPRAZOLE SODIUM 40 MG: 40 INJECTION, POWDER, FOR SOLUTION INTRAVENOUS at 07:29

## 2018-08-30 RX ADMIN — KETOROLAC TROMETHAMINE 30 MG: 30 INJECTION, SOLUTION INTRAMUSCULAR at 10:20

## 2018-08-30 RX ADMIN — SODIUM CHLORIDE, POTASSIUM CHLORIDE, SODIUM LACTATE AND CALCIUM CHLORIDE: 600; 310; 30; 20 INJECTION, SOLUTION INTRAVENOUS at 00:09

## 2018-08-30 RX ADMIN — SENNOSIDES AND DOCUSATE SODIUM 2 TABLET: 8.6; 5 TABLET ORAL at 07:29

## 2018-08-30 ASSESSMENT — ACTIVITIES OF DAILY LIVING (ADL)
ADLS_ACUITY_SCORE: 9

## 2018-08-30 NOTE — PLAN OF CARE
Problem: Patient Care Overview  Goal: Individualization & Mutuality  Outcome: No Change  Vitals:    08/29/18 1608 08/29/18 1937 08/29/18 2312 08/30/18 0310   BP: 168/88 156/80 127/82 133/70   BP Location: Right arm Right arm Right arm Right arm   Cuff Size:       Resp: 18 20 18 18   Temp: 98.4  F (36.9  C) 99.2  F (37.3  C) 99.1  F (37.3  C) 98.3  F (36.8  C)   TempSrc: Oral Oral Oral Oral   SpO2: 94% 99% 98% 97%   Weight:       Height:         Per 12 hours, max temp 99.2. Denies fevers or chills. Encouraged deep breathing and coughing and IS. Recheck temp 98.3. Otherwise VSS. O2 sats high 90s on RA, wears CPAP at night. Lung sounds clear. Patient c/o increased pressure pain in upper mid abdomen especially when swallowing. Notified provider who ordered Toradol every 6 hours scheduled and IV Protonix daily, and increased LR to 100 mL/hr, patient stated feeling much better, still has some pain with swallowing but pain has lessened. Lap sites steri-stripped, c/d/i. Abdomen rounded, BS+, passing flatus, no BM. Patient ambulated in halls independently x1. Voiding adequate amount of clear catalina urine in urinal. Slept in between cares. Continue POC.

## 2018-08-30 NOTE — PLAN OF CARE
Problem: Pain, Acute (Adult)  Goal: Identify Related Risk Factors and Signs and Symptoms  Related risk factors and signs and symptoms are identified upon initiation of Human Response Clinical Practice Guideline (CPG).   Vitals:    08/29/18 1937 08/29/18 2312 08/30/18 0310 08/30/18 0700   BP: 156/80 127/82 133/70 146/82   BP Location: Right arm Right arm Right arm Right arm   Cuff Size:       Resp: 20 18 18 18   Temp: 99.2  F (37.3  C) 99.1  F (37.3  C) 98.3  F (36.8  C) 98.1  F (36.7  C)   TempSrc: Oral Oral Oral Oral   SpO2: 99% 98% 97% 98%   Weight:       Height:       afebrile vital stable, sating 98% on room air, denies any pain, feels much better today, tolerating PO very good.  +BS passing gas, voiding adequate, discharge script written, teaching has been done,  Script sent to discharge pharmacy, follow up appointment is set up, pt discharge home.

## 2018-08-31 ENCOUNTER — CARE COORDINATION (OUTPATIENT)
Dept: SURGERY | Facility: CLINIC | Age: 48
End: 2018-08-31

## 2018-08-31 NOTE — PROGRESS NOTES
Bariatric Surgery Post op Call      72 Hour Post-Op Follow Up:     Mr. Jose J Marley is a 48 year old male who underwent Sleeve  on  with Dr. Sandy for a history of obesity.  Spoke with Patient.    Support  Patient able to care for self independently    Coughing/Deep Breathing: yes  Pain ratin - doing well    (If patient needs additional pain medication and Tylenol is not contraindicated, then ok  advise patient to take ES Tylenol 2 tablets every 6 hours while symptoms last, not to exceed 6 caplets in 24 hours).     Leg swelling: no  Nausea/Vomiting: no  Passing flatus: yes  Having BM s: yes  Are you taking your chewable multivitamin: no  Activity Level: walking  Fluid Intake: 64oz Confirm drinking full liquid diet  Concerns: no - swallowing.     Staples removed prior to discharge: yes    Taking any weight loss medications prior to surgery? no    Do you have any questions about medications that you were discharged on from the hospital?  no      Were you on diabetes medications before surgery? No     Activity/Restrictions  Patient following lifting restrictions.      Activity/Restrictions  Patient following lifting restrictions. and Following restrictions outlined by surgeon.     Whom and When to Call  Patient acknowledges understanding of how to manage any medication changes and when to seek medical care.      Patient advised that if after hour medical concerns arise to please call [phone] and choose option 4 to speak to the physician on call.      Confirm with patient their follow-up appointment date and time? yes     Time spent with patient: 5

## 2018-08-31 NOTE — PROGRESS NOTES
I attempted to call patient after procedure. Left him a detailed message on all recommendations. Left my number to contact if needing anything. Also left after hour physician number.  Informed him of appt.    Provider: Dr. Sandy  Date: 9/14  Time: 115 pm

## 2018-09-13 ENCOUNTER — OFFICE VISIT (OUTPATIENT)
Dept: SURGERY | Facility: CLINIC | Age: 48
End: 2018-09-13
Payer: COMMERCIAL

## 2018-09-13 VITALS
SYSTOLIC BLOOD PRESSURE: 147 MMHG | HEART RATE: 66 BPM | TEMPERATURE: 98.3 F | OXYGEN SATURATION: 97 % | DIASTOLIC BLOOD PRESSURE: 90 MMHG | BODY MASS INDEX: 36.98 KG/M2 | WEIGHT: 230.1 LBS | HEIGHT: 66 IN

## 2018-09-13 DIAGNOSIS — K31.83 ACHLORHYDRIA, GASTRIC: Primary | ICD-10-CM

## 2018-09-13 RX ORDER — ATORVASTATIN CALCIUM 10 MG/1
TABLET, FILM COATED ORAL
Refills: 0 | COMMUNITY
Start: 2017-10-25 | End: 2019-06-25

## 2018-09-13 RX ORDER — CALCIUM CARBONATE 300MG(750)
TABLET,CHEWABLE ORAL
COMMUNITY

## 2018-09-13 RX ORDER — GEMFIBROZIL 600 MG/1
TABLET, FILM COATED ORAL
Refills: 0 | COMMUNITY
Start: 2017-10-25 | End: 2019-06-25

## 2018-09-13 ASSESSMENT — PAIN SCALES - GENERAL: PAINLEVEL: NO PAIN (0)

## 2018-09-13 NOTE — PATIENT INSTRUCTIONS
Goals:  1) Follow bariatric low-fat full liquid diet through day 13 post-op, then to progress to pureed diet x 2 weeks.  If tolerating, may advance on day 29 post-op to bariatric soft diet(9/25).   2) Work towards 60 gm protein/day.  3) Consume 48-64 oz fluids daily- between meals.  4) Eat slowly (>20 min/meal), chewing well to smooth consistency once on the bariatric soft diet.  5) Limit portions to 1/2 cup/meal.  6) -Take the following after a Sleeve Gastrectomy:    Multivitamin/minerals: adult dose 2 times daily    Iron: 45-60 mg elemental (18-36 mg if low risk) - may partly or fully be covered in multivitamin     Calcium Citrate containing vitamin D: 500 mg 3 times daily or 600 mg 2 times daily    Vitamin B12: sublingual form of at least 500 mcg daily or injection of 1000 mcg monthly       Follow up with RD in one month    Raissa Martines RD, LD  If you need to schedule or reschedule with a dietitian please call [phone].

## 2018-09-13 NOTE — MR AVS SNAPSHOT
MRN:[id number]                      After Visit Summary   9/13/2018    Jose J Marley    MRN: [id number]           Visit Information        Provider Department      9/13/2018 9:30 AM Raissa Martines RD M [facility] Surgical Weight Management        Your next 10 appointments already scheduled     Sep 13, 2018 10:40 AM CDT   (Arrive by 10:25 AM)   Bariatric Post Op Global Visit with MD MAXIM Bedolla [facility] Surgical [facility] Management ([facility])    [address] [place] [id number]   [phone]            Oct 04, 2018 12:30 PM CDT   (Arrive by 12:15 PM)   New Bariatric Nutrition Visit with SENG Stratton [facility] Surgical Weight Management ([facility])    [address] [place] [address]   [phone]            Oct 04, 2018  1:20 PM CDT   (Arrive by 1:05 PM)   Bariatric Post Op Global Visit with MD MAXIM Bedolla [facility] Surgical [facility] Management ([facility])    [address] [place] [address]   [phone]              Care Instructions    Goals:  1) Follow bariatric low-fat full liquid diet through day 13 post-op, then to progress to pureed diet x 2 weeks.  If tolerating, may advance on day 29 post-op to bariatric soft diet(9/25).   2) Work towards 60 gm protein/day.  3) Consume 48-64 oz fluids daily- between meals.  4) Eat slowly (>20 min/meal), chewing well to smooth consistency once on the bariatric soft diet.  5) Limit portions to 1/2 cup/meal.  6) -Take the following after a Sleeve Gastrectomy:    Multivitamin/minerals: adult dose 2 times daily    Iron: 45-60 mg elemental (18-36 mg if low risk) - may partly or fully be covered in multivitamin     Calcium Citrate containing vitamin D: 500 mg 3 times daily or 600 mg 2 times daily    Vitamin B12: sublingual form of at least 500 mcg daily or injection of 1000  mcg monthly       Follow up with SENG in one month    Raissa Martines RD, CLINT  If you need to schedule or reschedule with a dietitian please call [phone].          [employer] Information     [employer] gives you secure access to your electronic health record. If you see a primary care provider, you can also send messages to your care team and make appointments. If you have questions, please call your primary care clinic.  If you do not have a primary care provider, please call [phone] and they will assist you.      [employer] is an electronic gateway that provides easy, online access to your medical records. With [employer], you can request a clinic appointment, read your test results, renew a prescription or communicate with your care team.     To access your existing account, please contact your [facility] or call [phone] for assistance.        Care EveryWhere ID     This is your Care EveryWhere ID. This could be used by other organizations to access your [facility] medical records  [id number]        Equal Access to Services     SUKHJINDER ZAMAN : Hadii aad ku hadasho Sorobiali, waaxda luqadaha, qaybta kaalmada adedreyajahaira, nidia correia . So [facility] [phone].    ATENCIÓN: Si habla español, tiene a paul disposición servicios gratuitos de asistencia lingüística. Llame al [phone].    We comply with applicable federal civil rights laws and [place] laws. We do not discriminate on the basis of race, color, national origin, age, disability, sex, sexual orientation, or gender identity.

## 2018-09-13 NOTE — LETTER
"9/13/2018       RE: Jose J Marley  [address] [place] [address]     Dear Colleague,    Thank you for referring your patient, Jose J Marley, to the [facility]NA[facility] at [facility]. Please see a copy of my visit note below.    Postoperative bariatric surgery visit.    Patient underwent sleeve gastrectomy 2 weeks ago.      Tolerating liquids: y  Lightheadedness: n  Abdominal pain: n  Bowel movements: y  Fevers/shakes/chills: n    /90  Pulse 66  Temp 98.3  F (36.8  C) (Oral)  Ht 1.664 m (5' 5.5\")  Wt 104.4 kg (230 lb 1.6 oz)  SpO2 97%  BMI 37.71 kg/m2  NAD  Overall looks well  Incisions c/d/i; abd s/nt/nd    Plan:  1. RD visit today.  2. Start vitamin supplements per RD directions.  3. Advance diet per RD directions.  4. Follow-up: 3 weeks.      Physician Attestation  I, Santosh Sandy, saw and evaluated this patient as part of a shared visit.  I have reviewed and discussed with the advanced practice provider and/or resident their history, physical and plan.    I personally reviewed the vital signs, medications, labs and imaging.    My key history or physical exam findings: doing well overall.    Key management decisions made by me: f/u in 4 weeks.      Again, thank you for allowing me to participate in the care of your patient.      Sincerely,    Santosh Sandy MD      "

## 2018-09-13 NOTE — NURSING NOTE
"(   Chief Complaint   Patient presents with     Surgical Followup     2 wk post op S/P LSG 8/28/18    )    ( Weight: 230 lb 1.6 oz )  ( Height: 5' 5.5\" )  ( BMI (Calculated): 37.79 )  ( Initial Weight: 255 lb 11.2 oz )  ( Cumulative weight loss (lbs): 25.6 )  ( Last Visits Weight: 225 lb 11.2 oz )  ( Wt change since last visit (lbs): 4.4 )  (   )  (   )    ( BP: 147/90 )  (   )  ( Temp: 98.3  F (36.8  C) )  ( Temp src: Oral )  ( Pulse: 66 )  (   )  ( SpO2: 97 % )    (   Patient Active Problem List   Diagnosis     Preventative health care     Essential hypertension     Mixed hyperlipidemia     ZEB (obstructive sleep apnea)     Morbid obesity (H)     Family history of premature CAD     Morbid (severe) obesity due to excess calories (H)    )  (   Current Outpatient Prescriptions   Medication Sig Dispense Refill     acetaminophen (TYLENOL) 325 MG tablet Take 2 tablets (650 mg) by mouth every 4 hours as needed for other (multimodal surgical pain management along with NSAIDS and opioid medication as indicated based on pain control and physical function.) 100 tablet 1     Cholecalciferol (VITAMIN D3) 1000 units CHEW        lisinopril (PRINIVIL/ZESTRIL) 20 MG tablet TAKE 1 TABLET BY MOUTH TWO  TIMES DAILY 180 tablet 1     metoprolol succinate (TOPROL-XL) 100 MG 24 hr tablet TAKE 1 TABLET BY MOUTH  DAILY 90 tablet 3     Multiple Vitamins-Minerals (MULTIVITAMIN ADULT PO) Take by mouth daily       omeprazole (PRILOSEC) 20 MG CR capsule Take 1 capsule (20 mg) by mouth 2 times daily 20 capsule 1     aspirin 81 MG tablet Take 81 mg by mouth daily       atorvastatin (LIPITOR) 10 MG tablet   0     cyclobenzaprine (FLEXERIL) 10 MG tablet Take half to one tablet every 8 hours as needed for muscle pain, not before driving (Patient not taking: Reported on 9/13/2018) 20 tablet 0     gemfibrozil (LOPID) 600 MG tablet TK 1 T PO BID  0     hyoscyamine (ANASPAZ/LEVSIN) 0.125 MG tablet Take 1-2 tablets (125-250 mcg) by mouth every 4 hours as " needed for cramping (Patient not taking: Reported on 9/13/2018) 60 tablet 1     metoprolol (LOPRESSOR) 10 mg/mL SUSP Take 5 mLs (50 mg) by mouth 2 times daily (Patient not taking: Reported on 9/13/2018) 400 mL 0    )  ( Diabetes Eval:    )    ( Pain Eval:  No Pain (0) )    ( Wound Eval:       )    (   History   Smoking Status     Never Smoker   Smokeless Tobacco     Never Used    )    ( Signed By:  Juan Luis Hopkins; September 13, 2018; 10:01 AM )

## 2018-09-13 NOTE — PROGRESS NOTES
Nutrition Assessment  Reason For Visit:  Jose J Marley is a 48 year old male presenting today for nutrition follow-up, 2 weeks s/p SG with Dr Sandy(8/28/18).  Patient referred by Dr Sandy(9/13/18).    Anthropometrics  Initial Consult Weight: 255.7 lbs  Day of Surgery Weight: 239.4 lbs  Current Weight: 230.1 lbs  Weight loss: -25.6 lbs from initial consult; -9.3 lbs from day of surgery    Current Vitamins/Minerals: multivitamin with iron, calcium, vitamin D, reaction to sublingual B12 per pt    Nutrition History  Pt reports consuming and tolerating bariatric clear and low-fat full liquid diets. Fluid intake appears adequate, consuming 48-64 oz/day.    Yogurt, applesauce, pudding, protein shakes(1 per day)    Nutrition Prescription:  Grams Protein: 60 (minimum)  Amount of Fluid: 48-64 oz    Nutrition Diagnosis  Food and nutrition-related knowledge deficit r/t lack of prior exposure to diet advancements beyond bariatric low-fat full liquid diet aeb pt unable to verbalize understanding of bariatric pureed and soft diets.    Intervention  Intervention At Appointment:  Materials/education provided on bariatric pureed and soft diets, protein intake, fluid intake, eating pace, portion control, avoiding excess sugar and fat, recommended vitamin/mineral supplements.    Patient Understanding: good  Expected Compliance: good    Goals:  1) Follow bariatric low-fat full liquid diet through day 13 post-op, then to progress to pureed diet x 2 weeks.  If tolerating, may advance on day 29 post-op to bariatric soft diet(9/25).   2) Work towards 60 gm protein/day.  3) Consume 48-64 oz fluids daily- between meals.  4) Eat slowly (>20 min/meal), chewing well to smooth consistency once on the bariatric soft diet.  5) Limit portions to 1/2 cup/meal.  6) -Take the following after a Sleeve Gastrectomy:    Multivitamin/minerals: adult dose 2 times daily    Iron: 45-60 mg elemental (18-36 mg if low risk) - may partly or fully be covered  in multivitamin     Calcium Citrate containing vitamin D: 500 mg 3 times daily or 600 mg 2 times daily    Vitamin B12: sublingual form of at least 500 mcg daily or injection of 1000 mcg monthly       Follow-Up: 3 weeks or prn    Time spent with patient: 15 minutes.  Raissa Martines, RD, LD

## 2018-09-13 NOTE — MR AVS SNAPSHOT
After Visit Summary   9/13/2018    Jose J Marley    MRN: [id number]           Patient Information     Date Of Birth          1970        Visit Information        Provider Department      9/13/2018 10:40 AM Santosh Sanyd MD M [facility] Surgical Weight Management        Today's Diagnoses     Achlorhydria, gastric    -  1       Follow-ups after your visit        Your next 10 appointments already scheduled     Oct 04, 2018 12:30 PM CDT   (Arrive by 12:15 PM)   New Bariatric Nutrition Visit with SENG Stratton [facility] Surgical Weight Management ([facility])    [address] [place] [address] [phone]            Oct 04, 2018  1:20 PM CDT   (Arrive by 1:05 PM)   Bariatric Post Op Global Visit with MD MAXIM Bedolla [facility] Surgical Weight Management ([facility])    [address] [place] [address] [phone]              Who to contact     Please call your clinic at [phone] to:    Ask questions about your health    Make or cancel appointments    Discuss your medicines    Learn about your test results    Speak to your doctor            Additional Information About Your Visit        i-nexusharMarkit Information     Webupo gives you secure access to your electronic health record. If you see a primary care provider, you can also send messages to your care team and make appointments. If you have questions, please call your primary care clinic.  If you do not have a primary care provider, please call [phone] and they will assist you.      Webupo is an electronic gateway that provides easy, online access to your medical records. With Webupo, you can request a clinic appointment, read your test results, renew a prescription or communicate with your care team.     To access your existing account, please contact your [facility] or call  "[phone] for assistance.        Care EveryWhere ID     This is your Care EveryWhere ID. This could be used by other organizations to access your [place] medical records  [id number]        Your Vitals Were     Pulse Temperature Height Pulse Oximetry BMI (Body Mass Index)       66 98.3  F (36.8  C) (Oral) 5' 5.5\" 97% 37.71 kg/m2        Blood Pressure from Last 3 Encounters:   09/13/18 147/90   08/30/18 146/82   08/12/18 157/84    Weight from Last 3 Encounters:   09/13/18 230 lb 1.6 oz   08/28/18 239 lb 6.7 oz   08/09/18 243 lb              Today, you had the following     No orders found for display       Primary Care Provider Office Phone # Fax #    R Arnold Crockett [phone]       [address] [place] [address]        Equal Access to Services     [facility] : Hadii aad ku hadasho Sobubba, waaxda luqadaha, qaybta kaalmada adeegyada, nidia correia . So [facility] [phone].    ATENCIÓN: Si habla español, tiene a paul disposición servicios gratuitos de asistencia lingüística. [place] [facility] [phone].    We comply with applicable federal civil rights laws and [place] laws. We do not discriminate on the basis of race, color, national origin, age, disability, sex, sexual orientation, or gender identity.            Thank you!     Thank you for choosing [facility] SURGICAL WEIGHT MANAGEMENT  for your care. Our goal is always to provide you with excellent care. Hearing back from our patients is one way we can continue to improve our services. Please take a few minutes to complete the written survey that you may receive in the mail after your visit with us. Thank you!             Your Updated Medication List - Protect others around you: Learn how to safely use, store and throw away your medicines at www.disposemymeds.org.          This list is accurate as of 9/13/18 11:59 PM.  Always use your most recent med list.                   Brand Name Dispense Instructions for " use Diagnosis    acetaminophen 325 MG tablet    TYLENOL    100 tablet    Take 2 tablets (650 mg) by mouth every 4 hours as needed for other (multimodal surgical pain management along with NSAIDS and opioid medication as indicated based on pain control and physical function.)    Morbid obesity (H)       aspirin 81 MG tablet      Take 81 mg by mouth daily        atorvastatin 10 MG tablet    LIPITOR          cyclobenzaprine 10 MG tablet    FLEXERIL    20 tablet    Take half to one tablet every 8 hours as needed for muscle pain, not before driving        gemfibrozil 600 MG tablet    LOPID     TK 1 T PO BID        hyoscyamine 0.125 MG tablet    ANASPAZ/LEVSIN    60 tablet    Take 1-2 tablets (125-250 mcg) by mouth every 4 hours as needed for cramping    Morbid obesity (H)       lisinopril 20 MG tablet    PRINIVIL/ZESTRIL    180 tablet    TAKE 1 TABLET BY MOUTH TWO  TIMES DAILY    Essential hypertension, Pre-diabetes       metoprolol 10 mg/mL Susp    LOPRESSOR    400 mL    Take 5 mLs (50 mg) by mouth 2 times daily    Essential hypertension       metoprolol succinate 100 MG 24 hr tablet    TOPROL-XL    90 tablet    TAKE 1 TABLET BY MOUTH  DAILY    Essential hypertension       MULTIVITAMIN ADULT PO      Take by mouth daily        omeprazole 20 MG CR capsule    priLOSEC    20 capsule    Take 1 capsule (20 mg) by mouth 2 times daily    Morbid obesity (H)       Vitamin D3 1000 units Chew

## 2018-09-13 NOTE — LETTER
[facility] SURGICAL WEIGHT MANAGEMENT  [address]  4th Floor  [facility] [address]  Phone: [phone]  Fax: [phone]    September 13, 2018        Jose J Marley  [address]  [place] [address]          To whom it may concern:    RE: Jose J Marley    Patient was seen and treated today at our clinic. He had recent abdominal surgery and is currently recovering. He is cleared to return to work Monday September 24, 2018, with restrictions of no heavy lifting over 10-20 lbs    Please contact me for questions or concerns.      Sincerely,        Santosh Sandy MD

## 2018-09-13 NOTE — LETTER
9/13/2018       RE: Jose J Marley  [address] [place] [address]     Dear Colleague,    Thank you for referring your patient, Jose J Marley, to the [facility]. Please see a copy of my visit note below.    Nutrition Assessment  Reason For Visit:  Jose J Marley is a 48 year old male presenting today for nutrition follow-up, 2 weeks s/p SG with Dr Sandy(8/28/18).  Patient referred by Dr Sandy(9/13/18).    Anthropometrics  Initial Consult Weight: 255.7 lbs  Day of Surgery Weight: 239.4 lbs  Current Weight: 230.1 lbs  Weight loss: -25.6 lbs from initial consult; -9.3 lbs from day of surgery    Current Vitamins/Minerals: multivitamin with iron, calcium, vitamin D, reaction to sublingual B12 per pt    Nutrition History  Pt reports consuming and tolerating bariatric clear and low-fat full liquid diets. Fluid intake appears adequate, consuming 48-64 oz/day.    Yogurt, applesauce, pudding, protein shakes(1 per day)    Nutrition Prescription:  Grams Protein: 60 (minimum)  Amount of Fluid: 48-64 oz    Nutrition Diagnosis  Food and nutrition-related knowledge deficit r/t lack of prior exposure to diet advancements beyond bariatric low-fat full liquid diet aeb pt unable to verbalize understanding of bariatric pureed and soft diets.    Intervention  Intervention At Appointment:  Materials/education provided on bariatric pureed and soft diets, protein intake, fluid intake, eating pace, portion control, avoiding excess sugar and fat, recommended vitamin/mineral supplements.    Patient Understanding: good  Expected Compliance: good    Goals:  1) Follow bariatric low-fat full liquid diet through day 13 post-op, then to progress to pureed diet x 2 weeks.  If tolerating, may advance on day 29 post-op to bariatric soft diet(9/25).   2) Work towards 60 gm protein/day.  3) Consume 48-64 oz fluids daily- between meals.  4) Eat slowly (>20  min/meal), chewing well to smooth consistency once on the bariatric soft diet.  5) Limit portions to 1/2 cup/meal.  6) -Take the following after a Sleeve Gastrectomy:    Multivitamin/minerals: adult dose 2 times daily    Iron: 45-60 mg elemental (18-36 mg if low risk) - may partly or fully be covered in multivitamin     Calcium Citrate containing vitamin D: 500 mg 3 times daily or 600 mg 2 times daily    Vitamin B12: sublingual form of at least 500 mcg daily or injection of 1000 mcg monthly       Follow-Up: 3 weeks or prn    Time spent with patient: 15 minutes.  Raissa Martines, RD, LD

## 2018-09-13 NOTE — PROGRESS NOTES
"Postoperative bariatric surgery visit.    Patient underwent sleeve gastrectomy 2 weeks ago.      Tolerating liquids: y  Lightheadedness: n  Abdominal pain: n  Bowel movements: y  Fevers/shakes/chills: n    /90  Pulse 66  Temp 98.3  F (36.8  C) (Oral)  Ht 1.664 m (5' 5.5\")  Wt 104.4 kg (230 lb 1.6 oz)  SpO2 97%  BMI 37.71 kg/m2  NAD  Overall looks well  Incisions c/d/i; abd s/nt/nd    Plan:  1. RD visit today.  2. Start vitamin supplements per RD directions.  3. Advance diet per RD directions.  4. Follow-up: 3 weeks.      Physician Attestation  I, Santosh Sandy, saw and evaluated this patient as part of a shared visit.  I have reviewed and discussed with the advanced practice provider and/or resident their history, physical and plan.    I personally reviewed the vital signs, medications, labs and imaging.    My key history or physical exam findings: doing well overall.    Key management decisions made by me: f/u in 4 weeks.    Santosh Sandy MD  Date of Service (when I saw the patient): 9/13/18                "

## 2018-09-28 ENCOUNTER — CARE COORDINATION (OUTPATIENT)
Dept: SURGERY | Facility: CLINIC | Age: 48
End: 2018-09-28

## 2018-09-28 NOTE — PROGRESS NOTES
Called and left patient a message in regards to seeing Lala YOUSIF on Thursday the 4th instead of Dr. Sandy but MD would be available if anything needed to be discussed with him. Also let him know time had not changed and left time and date of appt. Also left contact number if he had any questions or concerns.

## 2018-10-01 ENCOUNTER — CARE COORDINATION (OUTPATIENT)
Dept: SURGERY | Facility: CLINIC | Age: 48
End: 2018-10-01

## 2018-10-01 NOTE — PROGRESS NOTES
Returned patients call and left appropriate numbers for him to call back in regards to insurance questions and any clinic questions.

## 2018-10-04 ENCOUNTER — ALLIED HEALTH/NURSE VISIT (OUTPATIENT)
Dept: SURGERY | Facility: CLINIC | Age: 48
End: 2018-10-04
Payer: COMMERCIAL

## 2018-10-04 ENCOUNTER — OFFICE VISIT (OUTPATIENT)
Dept: GASTROENTEROLOGY | Facility: CLINIC | Age: 48
End: 2018-10-04
Payer: COMMERCIAL

## 2018-10-04 VITALS — HEIGHT: 66 IN | WEIGHT: 224.38 LBS | BODY MASS INDEX: 36.06 KG/M2

## 2018-10-04 VITALS
OXYGEN SATURATION: 98 % | DIASTOLIC BLOOD PRESSURE: 84 MMHG | SYSTOLIC BLOOD PRESSURE: 140 MMHG | HEART RATE: 60 BPM | TEMPERATURE: 98 F

## 2018-10-04 DIAGNOSIS — E66.9 OBESITY: Primary | ICD-10-CM

## 2018-10-04 DIAGNOSIS — Z71.3 NUTRITIONAL COUNSELING: ICD-10-CM

## 2018-10-04 DIAGNOSIS — Z98.84 S/P LAPAROSCOPIC SLEEVE GASTRECTOMY: ICD-10-CM

## 2018-10-04 DIAGNOSIS — Z98.84 STATUS POST BARIATRIC SURGERY: Primary | ICD-10-CM

## 2018-10-04 RX ORDER — URSODIOL 300 MG/1
300 CAPSULE ORAL 2 TIMES DAILY
Qty: 60 CAPSULE | Refills: 4 | Status: SHIPPED | OUTPATIENT
Start: 2018-10-04 | End: 2021-10-01

## 2018-10-04 NOTE — MR AVS SNAPSHOT
After Visit Summary   10/4/2018    Jose J Marley    MRN: [id number]           Patient Information     Date Of Birth          1970        Visit Information        Provider Department      10/4/2018 1:20 PM Nurse,  Surgery General Surgery        Today's Diagnoses     Status post bariatric surgery    -  1      Care Instructions    Medical Plan:    1. RD visit today.  2. Start vitamin supplements per RD directions.  3. Advance diet per RD directions.  4. Follow-up: 8 weeks  5. Actigall prescription sent to pharmacy   6. B12 SL taking OTC     It was a pleasure meeting with you today.     Thank you for allowing us the privilege of caring for you. We hope we provided you with the excellent service you deserve.     Please let us know if there is anything else we can do for you so that we can be sure you are leaving completely satisfied with your care experience.    Main follow-up parameters for all bariatric patients     Patients who have undergone Bariatric surgery:    1. Follow-up interval during the first year: ~1 week, 1 month, 3 month, 6 month,12 months.  Every 6 months until 5 years; and then annually thereafter.  The goal of follow-up sessions is to ensure that food intake behavior (choice of food and eating speed) and exercise/activity level are set appropriately.    2. Yearly lab tests ordered by our clinic.      3. The bariatric team should be aware and potentially evaluate all adverse gastrointestinal symptoms (dysphagia, abdominal pain, nausea, vomiting, diarrhea, heartburn, reflux, etc), which can be a sign of complication.  The bariatric surgeons perform general surgery procedures in addition to bariatric surgery (laparoscopic cholecystectomy, etc.)    4. Inability to tolerate textured food (chicken, steak, fish, eggs, beans) is NOT normal and may need to be evaluated by endoscopy performed by the bariatric surgeon.                         Follow-ups after your visit        Who to  contact     Please call your clinic at [phone] to:    Ask questions about your health    Make or cancel appointments    Discuss your medicines    Learn about your test results    Speak to your doctor            Additional Information About Your Visit        Daily Sales Exchange[employer]Resonergy Information     Spowit gives you secure access to your electronic health record. If you see a primary care provider, you can also send messages to your care team and make appointments. If you have questions, please call your primary care clinic.  If you do not have a primary care provider, please call [phone] and they will assist you.      Spowit is an electronic gateway that provides easy, online access to your medical records. With Spowit, you can request a clinic appointment, read your test results, renew a prescription or communicate with your care team.     To access your existing account, please contact your [facility] or call [phone] for assistance.        Care EveryWhere ID     This is your Care EveryWhere ID. This could be used by other organizations to access your [place] medical records  [id number]        Your Vitals Were     Pulse Temperature Pulse Oximetry             60 98  F (36.7  C) (Oral) 98%          Blood Pressure from Last 3 Encounters:   10/04/18 140/84   09/13/18 147/90   08/30/18 146/82    Weight from Last 3 Encounters:   10/04/18 224 lb 6 oz   09/13/18 230 lb 1.6 oz   08/28/18 239 lb 6.7 oz              Today, you had the following     No orders found for display         Today's Medication Changes          These changes are accurate as of 10/4/18  3:03 PM.  If you have any questions, ask your nurse or doctor.               Start taking these medicines.        Dose/Directions    ursodiol 300 MG capsule   Commonly known as:  ACTIGALL   Used for:  Status post bariatric surgery   Started by:  Nurse,  Surgery        Dose:  300 mg   Take 1 capsule (300 mg) by mouth 2 times daily    Quantity:  60 capsule   Refills:  4            Where to get your medicines      These medications were sent to [id number] - [place], [place] - [address] AT [facility] OF  [address], [facility] [address]     Phone:  [phone]     ursodiol 300 MG capsule                Primary Care Provider Office Phone # Fax #    R Arnold Crockett [phone]       [address] [facility] [address]        Equal Access to Services     [facility] : Hadii aad ku hadasho Soomaali, waaxda luqadaha, qaybta kaalmada adeegyada, waxcynthia maurer hayjasmina correia . So [facility] [phone].    ATENCIÓN: Si habla español, tiene a paul disposición servicios gratuitos de asistencia lingüística. [facility] [phone].    We comply with applicable federal civil rights laws and [place] laws. We do not discriminate on the basis of race, color, national origin, age, disability, sex, sexual orientation, or gender identity.            Thank you!     Thank you for choosing GENERAL SURGERY  for your care. Our goal is always to provide you with excellent care. Hearing back from our patients is one way we can continue to improve our services. Please take a few minutes to complete the written survey that you may receive in the mail after your visit with us. Thank you!             Your Updated Medication List - Protect others around you: Learn how to safely use, store and throw away your medicines at www.disposemymeds.org.          This list is accurate as of 10/4/18  3:03 PM.  Always use your most recent med list.                   Brand Name Dispense Instructions for use Diagnosis    acetaminophen 325 MG tablet    TYLENOL    100 tablet    Take 2 tablets (650 mg) by mouth every 4 hours as needed for other (multimodal surgical pain management along with NSAIDS and opioid medication as indicated based on pain control and physical function.)    Morbid obesity (H)       aspirin 81 MG tablet      Take 81  mg by mouth daily        atorvastatin 10 MG tablet    LIPITOR          cyclobenzaprine 10 MG tablet    FLEXERIL    20 tablet    Take half to one tablet every 8 hours as needed for muscle pain, not before driving        gemfibrozil 600 MG tablet    LOPID     TK 1 T PO BID        hyoscyamine 0.125 MG tablet    ANASPAZ/LEVSIN    60 tablet    Take 1-2 tablets (125-250 mcg) by mouth every 4 hours as needed for cramping    Morbid obesity (H)       lisinopril 20 MG tablet    PRINIVIL/ZESTRIL    180 tablet    TAKE 1 TABLET BY MOUTH TWO  TIMES DAILY    Essential hypertension, Pre-diabetes       metoprolol 10 mg/mL Susp    LOPRESSOR    400 mL    Take 5 mLs (50 mg) by mouth 2 times daily    Essential hypertension       metoprolol succinate 100 MG 24 hr tablet    TOPROL-XL    90 tablet    TAKE 1 TABLET BY MOUTH  DAILY    Essential hypertension       MULTIVITAMIN ADULT PO      Take by mouth daily        omeprazole 20 MG CR capsule    priLOSEC    20 capsule    Take 1 capsule (20 mg) by mouth 2 times daily    Morbid obesity (H)       ursodiol 300 MG capsule    ACTIGALL    60 capsule    Take 1 capsule (300 mg) by mouth 2 times daily    Status post bariatric surgery       Vitamin D3 1000 units Chew

## 2018-10-04 NOTE — PATIENT INSTRUCTIONS
Medical Plan:    1. RD visit today.  2. Start vitamin supplements per RD directions.  3. Advance diet per RD directions.  4. Follow-up: 8 weeks  5. Actigall prescription sent to pharmacy   6. B12 SL taking OTC     It was a pleasure meeting with you today.     Thank you for allowing us the privilege of caring for you. We hope we provided you with the excellent service you deserve.     Please let us know if there is anything else we can do for you so that we can be sure you are leaving completely satisfied with your care experience.    Main follow-up parameters for all bariatric patients     Patients who have undergone Bariatric surgery:    1. Follow-up interval during the first year: ~1 week, 1 month, 3 month, 6 month,12 months.  Every 6 months until 5 years; and then annually thereafter.  The goal of follow-up sessions is to ensure that food intake behavior (choice of food and eating speed) and exercise/activity level are set appropriately.    2. Yearly lab tests ordered by our clinic.      3. The bariatric team should be aware and potentially evaluate all adverse gastrointestinal symptoms (dysphagia, abdominal pain, nausea, vomiting, diarrhea, heartburn, reflux, etc), which can be a sign of complication.  The bariatric surgeons perform general surgery procedures in addition to bariatric surgery (laparoscopic cholecystectomy, etc.)    4. Inability to tolerate textured food (chicken, steak, fish, eggs, beans) is NOT normal and may need to be evaluated by endoscopy performed by the bariatric surgeon.

## 2018-10-04 NOTE — MR AVS SNAPSHOT
After Visit Summary   10/4/2018    Jose J Marley    MRN: [id number]           Patient Information     Date Of Birth          1970        Visit Information        Provider Department      10/4/2018 12:30 PM Audrey Noel RD [facility]        Care Instructions    Was nice meeting you today:    Goals:  1) Follow soft diet for 4 weeks, then to progress to bariatric regular diet. (on October 24)  2) Consume 60 grams of protein/day.  3) Increase and Sip on 48-64 oz of fluids/day- between meals only.  4) Eat slowly (>20 min/meal), chewing foods well (to applesauce-like consistency).  5) Limit portions to 1/2 cup/meal.  6) Take the following supplements:     Multivitamin/minerals with iron: adult dose 2 times daily    Calcium Citrate containing vitamin D: 500 mg 3 times daily or 600 mg 2 times daily    Vitamin B12: sublingual form of at least 500 mcg daily or injection of 1000 mcg monthly     Get a total of 5,000 international units of vitamin D3    Audrey Noel MS, RD, LD  [phone].            Follow-ups after your visit        Your next 10 appointments already scheduled     Oct 04, 2018  1:20 PM CDT   Nurse Visit with [facility] Surgery Nurse   General Surgery ([facility])    [address] [place] [id number] [phone]              Who to contact     Please call your clinic at [phone] to:    Ask questions about your health    Make or cancel appointments    Discuss your medicines    Learn about your test results    Speak to your doctor            Additional Information About Your Visit        Greendizer[employer] Information     [employer] gives you secure access to your electronic health record. If you see a primary care provider, you can also send messages to your care team and make appointments. If you have questions, please call your primary care clinic.  If you do not have a primary care provider, please call [phone]  and they will assist you.      Abcam is an electronic gateway that provides easy, online access to your medical records. With Abcam, you can request a clinic appointment, read your test results, renew a prescription or communicate with your care team.     To access your existing account, please contact your [facility] or call [phone] for assistance.        Care EveryWhere ID     This is your Care EveryWhere ID. This could be used by other organizations to access your [facility] medical records  [id number]         Blood Pressure from Last 3 Encounters:   09/13/18 147/90   08/30/18 146/82   08/12/18 157/84    Weight from Last 3 Encounters:   09/13/18 104.4 kg (230 lb 1.6 oz)   08/28/18 108.6 kg (239 lb 6.7 oz)   08/09/18 110.2 kg (243 lb)              Today, you had the following     No orders found for display       Primary Care Provider Office Phone # Fax #    R Arnold Crockett [phone]       [address] [place] [address]        Equal Access to Services     [facility]: Hadii aad ku hadasho Soomaali, waaxda luqadaha, qaybta kaalmada adegertrudis, nidia kemp. So [facility] [phone].    ATENCIÓN: Si habla español, tiene a paul disposición servicios gratuitos de asistencia lingüística. [facility] [phone].    We comply with applicable federal civil rights laws and [place] laws. We do not discriminate on the basis of race, color, national origin, age, disability, sex, sexual orientation, or gender identity.            Thank you!     Thank you for choosing [facility]  for your care. Our goal is always to provide you with excellent care. Hearing back from our patients is one way we can continue to improve our services. Please take a few minutes to complete the written survey that you may receive in the mail after your visit with us. Thank you!             Your Updated Medication List - Protect  others around you: Learn how to safely use, store and throw away your medicines at www.disposemymeds.org.          This list is accurate as of 10/4/18 12:38 PM.  Always use your most recent med list.                   Brand Name Dispense Instructions for use Diagnosis    acetaminophen 325 MG tablet    TYLENOL    100 tablet    Take 2 tablets (650 mg) by mouth every 4 hours as needed for other (multimodal surgical pain management along with NSAIDS and opioid medication as indicated based on pain control and physical function.)    Morbid obesity (H)       aspirin 81 MG tablet      Take 81 mg by mouth daily        atorvastatin 10 MG tablet    LIPITOR          cyclobenzaprine 10 MG tablet    FLEXERIL    20 tablet    Take half to one tablet every 8 hours as needed for muscle pain, not before driving        gemfibrozil 600 MG tablet    LOPID     TK 1 T PO BID        hyoscyamine 0.125 MG tablet    ANASPAZ/LEVSIN    60 tablet    Take 1-2 tablets (125-250 mcg) by mouth every 4 hours as needed for cramping    Morbid obesity (H)       lisinopril 20 MG tablet    PRINIVIL/ZESTRIL    180 tablet    TAKE 1 TABLET BY MOUTH TWO  TIMES DAILY    Essential hypertension, Pre-diabetes       metoprolol 10 mg/mL Susp    LOPRESSOR    400 mL    Take 5 mLs (50 mg) by mouth 2 times daily    Essential hypertension       metoprolol succinate 100 MG 24 hr tablet    TOPROL-XL    90 tablet    TAKE 1 TABLET BY MOUTH  DAILY    Essential hypertension       MULTIVITAMIN ADULT PO      Take by mouth daily        omeprazole 20 MG CR capsule    priLOSEC    20 capsule    Take 1 capsule (20 mg) by mouth 2 times daily    Morbid obesity (H)       Vitamin D3 1000 units Chew

## 2018-10-04 NOTE — LETTER
10/4/2018       RE: Jose J Marley  [address] [place] [address]     Dear Colleague,    Thank you for referring your patient, Jose J Marley, to the [facility] at [facility]. Please see a copy of my visit note below.    Nutrition Assessment  Reason For Visit:  Jose J Marley is a 48 year old male presenting today for nutrition follow-up, 1 month s/p SG with Dr Sandy(8/28/18).  Patient referred by Dr Sandy(9/13/18) and Albania Haskins (10/4/2018).    Anthropometrics  Initial Consult Weight: 255.7 lbs  Day of Surgery Weight: 239.4 lbs  Current Weight: 224.6 lbs  Weight loss: -31.1 lbs from initial consult; -14.8 lbs from day of surgery    Current Vitamins/Minerals: multivitamin with iron, calcium with vitamin D, sublingual B12 (tolerating now and stated that initially he got a rash or hives when he took B12 right after surgery, but now not having issues).     Nutrition History  Pt reports consuming and tolerating bariatric soft diet. Reported that he is working on trying to consume adequate fluid. Does drink 32 oz per day +11 oz protein drink and sometimes 1 c unsweetened almond milk, so drinks ~43 oz-51 oz fluids per day. See diet recall below as well as progress towards previous goals.    Diet recall:  Breakfast: 1/2 premier shake OR 1-2 scrambled eggs and sausage or chavez OR malt o meal with cinnamon  Lunch: leftovers (1/2 c spaghetti with meat sauce) and sf pudding or applesauce cup or tuna pouch and applesauce  Dinner: spaghetti with meat sauce or 3 oz burger no bun with 1/4 c green beans/cooked carrot/broccoli sometimes sometime a bite potato  Snacks: none or string cheese and sometimes other 1/2 of premier shake if did not have a whole one at breakfast  Beverages: 32 oz water, 11 oz premier protein drink, sometimes 1 c almond milk. denies soda/coffee tea    Physical activity: walking 10,000 steps per day (taking walks around  work bldg on breaks and/or before and after work.    Progress towards Goals:  1) Follow bariatric low-fat full liquid diet through day 13 post-op, then to progress to pureed diet x 2 weeks.  If tolerating, may advance on day 29 post-op to bariatric soft diet(9/25). -Meeting  2) Work towards 60 gm protein/day.-Meeting  3) Consume 48-64 oz fluids daily- between meals.-Not meeting consistently. Drinks ~43-51 oz per day.  4) Eat slowly (>20 min/meal), chewing well to smooth consistency once on the bariatric soft diet.-Meeting  5) Limit portions to 1/2 cup/meal.-Some meals are up to ~1/2c-1 c of food per meal  6) -Take the following after a Sleeve Gastrectomy:    Multivitamin/minerals: adult dose 2 times daily-Meeting    Iron: 45-60 mg elemental (18-36 mg if low risk) - may partly or fully be covered in multivitamin -his MVI contains iron.    Calcium Citrate containing vitamin D: 500 mg 3 times daily or 600 mg 2 times daily-taking calcium citrate but he was uncertain how much ca his supplement contains    Vitamin B12: sublingual form of at least 500 mcg daily or injection of 1000 mcg monthly -Meeting    Nutrition Prescription:  Grams Protein: 60 (minimum)  Amount of Fluid: 48-64 oz    Nutrition Diagnosis  Food and nutrition-related knowledge deficit r/t lack of prior exposure to diet advancements beyond bariatric soft aeb pt unable to verbalize understanding of bariatric regular diets.    Intervention  Intervention At Appointment:  Materials/education provided on bariatric regular diet and reviewed some bariatric soft diet recommendations and tips, protein intake, fluid intake, eating pace, portion control, avoiding excess sugar and fat, recommended vitamin/mineral supplements.    Patient Understanding: good  Expected Compliance: good    Goals:  1) Follow soft diet for 4 weeks, then to progress to bariatric regular diet. (on October 24)  2) Consume 60 grams of protein/day.  3) Increase and Sip on 48-64 oz of fluids/day-  between meals only.  4) Eat slowly (>20 min/meal), chewing foods well (to applesauce-like consistency).  5) Limit portions to 1/2 cup/meal.  6) Take the following supplements:     Multivitamin/minerals with iron: adult dose 2 times daily    Calcium Citrate containing vitamin D: 500 mg 3 times daily or 600 mg 2 times daily    Vitamin B12: sublingual form of at least 500 mcg daily or injection of 1000 mcg monthly     Get a total of 5,000 international units of vitamin D3    Follow-Up: 3 months post op or prn    Time spent with patient: 30 minutes.  Audrey Noel, MS, RD, LD

## 2018-10-04 NOTE — PATIENT INSTRUCTIONS
Was nice meeting you today:    Goals:  1) Follow soft diet for 4 weeks, then to progress to bariatric regular diet. (on October 24)  2) Consume 60 grams of protein/day.  3) Increase and Sip on 48-64 oz of fluids/day- between meals only.  4) Eat slowly (>20 min/meal), chewing foods well (to applesauce-like consistency).  5) Limit portions to 1/2 cup/meal.  6) Take the following supplements:     Multivitamin/minerals with iron: adult dose 2 times daily    Calcium Citrate containing vitamin D: 500 mg 3 times daily or 600 mg 2 times daily    Vitamin B12: sublingual form of at least 500 mcg daily or injection of 1000 mcg monthly     Get a total of 5,000 international units of vitamin D3    Audrey Noel MS, RD, LD  [phone].

## 2018-10-04 NOTE — PROGRESS NOTES
[facility]: Nurse (RN) Visit Note  SITUATION/BACKGROUND                                                      Postoperative bariatric surgery visit.    Jose J Marley is a 48 year old male, who underwent sleeve gastrectomy 5 weeks ago.    Initial weight 255.7  Today's weight 224.6    Tolerating liquids: yes  Lightheadedness: none  Abdominal pain: none  Bowel movements: moderate constipation, managing with stool softeners, improving over the last week  Fevers/shakes/chills: none   GERD: none    Has not restarted lipitor and gemfibrozil, plans to follow up with primary care provider in November to reassess.      ASSESSMENT      Vital Signs:  /84 (BP Location: Right arm, Patient Position: Sitting, Cuff Size: Adult Regular)  Pulse 60  Temp 98  F (36.7  C) (Oral)  SpO2 98%     Exam:  NAD  Overall looks well  Incisions c/d/i; soft, non-tender      RECOMMENDATION/PLAN                                                      Medical Plan:    1. RD visit today.  2. Start vitamin supplements per RD directions.  3. Advance diet per RD directions.  4. Follow-up: 8 weeks  5. Actigall prescription sent to pharmacy   6. B12 SL taking OTC      Today's visit was:  Ordered or requested by: PASCUAL Snider. Supervising provider: PASCUAL Snider who was in clinic and available if needed.         Lala Sarmiento RN

## 2018-10-04 NOTE — PROGRESS NOTES
Nutrition Assessment  Reason For Visit:  Jose J Marley is a 48 year old male presenting today for nutrition follow-up, 1 month s/p SG with Dr Sandy(8/28/18).  Patient referred by Dr Sandy(9/13/18) and Albania Haskins (10/4/2018).    Anthropometrics  Initial Consult Weight: 255.7 lbs  Day of Surgery Weight: 239.4 lbs  Current Weight: 224.6 lbs  Weight loss: -31.1 lbs from initial consult; -14.8 lbs from day of surgery    Current Vitamins/Minerals: multivitamin with iron, calcium with vitamin D, sublingual B12 (tolerating now and stated that initially he got a rash or hives when he took B12 right after surgery, but now not having issues).     Nutrition History  Pt reports consuming and tolerating bariatric soft diet. Reported that he is working on trying to consume adequate fluid. Does drink 32 oz per day +11 oz protein drink and sometimes 1 c unsweetened almond milk, so drinks ~43 oz-51 oz fluids per day. See diet recall below as well as progress towards previous goals.    Diet recall:  Breakfast: 1/2 premier shake OR 1-2 scrambled eggs and sausage or chavez OR malt o meal with cinnamon  Lunch: leftovers (1/2 c spaghetti with meat sauce) and sf pudding or applesauce cup or tuna pouch and applesauce  Dinner: spaghetti with meat sauce or 3 oz burger no bun with 1/4 c green beans/cooked carrot/broccoli sometimes sometime a bite potato  Snacks: none or string cheese and sometimes other 1/2 of premier shake if did not have a whole one at breakfast  Beverages: 32 oz water, 11 oz premier protein drink, sometimes 1 c almond milk. denies soda/coffee tea    Physical activity: walking 10,000 steps per day (taking walks around work bldg on breaks and/or before and after work.    Progress towards Goals:  1) Follow bariatric low-fat full liquid diet through day 13 post-op, then to progress to pureed diet x 2 weeks.  If tolerating, may advance on day 29 post-op to bariatric soft diet(9/25). -Meeting  2) Work towards 60 gm  protein/day.-Meeting  3) Consume 48-64 oz fluids daily- between meals.-Not meeting consistently. Drinks ~43-51 oz per day.  4) Eat slowly (>20 min/meal), chewing well to smooth consistency once on the bariatric soft diet.-Meeting  5) Limit portions to 1/2 cup/meal.-Some meals are up to ~1/2c-1 c of food per meal  6) -Take the following after a Sleeve Gastrectomy:    Multivitamin/minerals: adult dose 2 times daily-Meeting    Iron: 45-60 mg elemental (18-36 mg if low risk) - may partly or fully be covered in multivitamin -his MVI contains iron.    Calcium Citrate containing vitamin D: 500 mg 3 times daily or 600 mg 2 times daily-taking calcium citrate but he was uncertain how much ca his supplement contains    Vitamin B12: sublingual form of at least 500 mcg daily or injection of 1000 mcg monthly -Meeting    Nutrition Prescription:  Grams Protein: 60 (minimum)  Amount of Fluid: 48-64 oz    Nutrition Diagnosis  Food and nutrition-related knowledge deficit r/t lack of prior exposure to diet advancements beyond bariatric soft aeb pt unable to verbalize understanding of bariatric regular diets.    Intervention  Intervention At Appointment:  Materials/education provided on bariatric regular diet and reviewed some bariatric soft diet recommendations and tips, protein intake, fluid intake, eating pace, portion control, avoiding excess sugar and fat, recommended vitamin/mineral supplements.    Patient Understanding: good  Expected Compliance: good    Goals:  1) Follow soft diet for 4 weeks, then to progress to bariatric regular diet. (on October 24)  2) Consume 60 grams of protein/day.  3) Increase and Sip on 48-64 oz of fluids/day- between meals only.  4) Eat slowly (>20 min/meal), chewing foods well (to applesauce-like consistency).  5) Limit portions to 1/2 cup/meal.  6) Take the following supplements:     Multivitamin/minerals with iron: adult dose 2 times daily    Calcium Citrate containing vitamin D: 500 mg 3 times  daily or 600 mg 2 times daily    Vitamin B12: sublingual form of at least 500 mcg daily or injection of 1000 mcg monthly     Get a total of 5,000 international units of vitamin D3    Follow-Up: 3 months post op or prn    Time spent with patient: 30 minutes.  Audrey Noel, MS, RD, LD

## 2018-10-08 DIAGNOSIS — Z98.84 STATUS POST BARIATRIC SURGERY: ICD-10-CM

## 2018-10-08 RX ORDER — URSODIOL 300 MG/1
300 CAPSULE ORAL 2 TIMES DAILY
Qty: 180 CAPSULE | Status: CANCELLED | OUTPATIENT
Start: 2018-10-08

## 2018-12-29 DIAGNOSIS — I10 ESSENTIAL HYPERTENSION: ICD-10-CM

## 2018-12-31 RX ORDER — METOPROLOL SUCCINATE 100 MG/1
TABLET, EXTENDED RELEASE ORAL
Qty: 90 TABLET | Refills: 0 | Status: SHIPPED | OUTPATIENT
Start: 2018-12-31 | End: 2019-05-22

## 2019-02-15 ENCOUNTER — MEDICAL CORRESPONDENCE (OUTPATIENT)
Dept: HEALTH INFORMATION MANAGEMENT | Facility: CLINIC | Age: 49
End: 2019-02-15

## 2019-02-21 DIAGNOSIS — E78.00 PURE HYPERCHOLESTEROLEMIA: ICD-10-CM

## 2019-02-21 RX ORDER — ATORVASTATIN CALCIUM 10 MG/1
TABLET, FILM COATED ORAL
Qty: 90 TABLET | Refills: 3 | Status: SHIPPED | OUTPATIENT
Start: 2019-02-21 | End: 2020-03-23

## 2019-02-21 NOTE — TELEPHONE ENCOUNTER
The source prescription was discontinued on 8/28/2018 by Camilo Barajas MD for the following reason: Stop at Discharge.

## 2019-02-21 NOTE — TELEPHONE ENCOUNTER
Lipitor  Last Written Prescription Date: Patient Reported on 10/25/17  Last Fill Quantity: NA # of Refills: NA  Last Office Visit: 1/25/18

## 2019-05-22 DIAGNOSIS — I10 ESSENTIAL HYPERTENSION: ICD-10-CM

## 2019-05-22 DIAGNOSIS — R73.03 PRE-DIABETES: ICD-10-CM

## 2019-05-22 NOTE — TELEPHONE ENCOUNTER
Lisinopril       Last Written Prescription Date:  8/13/2018  Last Fill Quantity: 180,   # refills: 1  Last Office Visit: 10/4/2018  Future Office visit:

## 2019-05-22 NOTE — TELEPHONE ENCOUNTER
Metoprolol       Last Written Prescription Date:  12/31/2018  Last Fill Quantity: 90,   # refills: 0  Last Office Visit: 10/4/2018  Future Office visit:

## 2019-05-22 NOTE — LETTER
May 23, 2019      Jose J Marley  [address] [place] [address]        Dear Jose J,     APPOINTMENT REMINDER:   Our records indicates that it is time for you to be seen for a yearly follow up appointment.     Your current medication request for Metoprolol will be approved for one refill but you will need to be seen before any additional refills can be approved.  Taking care of your health is important to us, and ongoing visits with your provider are vital to your care.    We look forward to seeing you in the near future.  You may call our office at [phone] to schedule a visit.     Please disregard this notice if you have already made an appointment.        Sincerely,  KRISTINE Crockett MD

## 2019-05-23 DIAGNOSIS — E78.2 ELEVATED TRIGLYCERIDES WITH HIGH CHOLESTEROL: ICD-10-CM

## 2019-05-23 RX ORDER — LISINOPRIL 20 MG/1
TABLET ORAL
Qty: 180 TABLET | Refills: 1 | Status: SHIPPED | OUTPATIENT
Start: 2019-05-23 | End: 2019-12-04

## 2019-05-23 RX ORDER — METOPROLOL SUCCINATE 100 MG/1
TABLET, EXTENDED RELEASE ORAL
Qty: 90 TABLET | Refills: 0 | Status: SHIPPED | OUTPATIENT
Start: 2019-05-23 | End: 2019-09-07

## 2019-05-24 ENCOUNTER — MYC MEDICAL ADVICE (OUTPATIENT)
Dept: FAMILY MEDICINE | Facility: OTHER | Age: 49
End: 2019-05-24

## 2019-05-24 DIAGNOSIS — Z98.84 S/P GASTRIC BYPASS: ICD-10-CM

## 2019-05-24 DIAGNOSIS — I10 ESSENTIAL HYPERTENSION: ICD-10-CM

## 2019-05-24 DIAGNOSIS — E66.01 MORBID (SEVERE) OBESITY DUE TO EXCESS CALORIES (H): ICD-10-CM

## 2019-05-24 DIAGNOSIS — R73.03 PRE-DIABETES: ICD-10-CM

## 2019-05-24 DIAGNOSIS — Z00.00 ROUTINE HISTORY AND PHYSICAL EXAMINATION OF ADULT: Primary | ICD-10-CM

## 2019-05-24 DIAGNOSIS — E78.2 MIXED HYPERLIPIDEMIA: ICD-10-CM

## 2019-05-24 RX ORDER — GEMFIBROZIL 600 MG/1
TABLET, FILM COATED ORAL
Qty: 60 TABLET | Refills: 0 | Status: SHIPPED | OUTPATIENT
Start: 2019-05-24 | End: 2019-07-15

## 2019-05-24 NOTE — TELEPHONE ENCOUNTER
gemfibrozil (LOPID) 600 MG tablet (Discontinued)    Reason for Discontinue: Stop at Discharge  Last Written Prescription Date:  1/25/18  Last Fill Quantity: 180,   # refills: 3  Last Office Visit: 1/25/18  Future Office visit:    Next 5 appointments (look out 90 days)    Jun 11, 2019  2:30 PM CDT  (Arrive by 2:15 PM)  MyChart Short with KRISTINE Crockett MD  [facility] ([facility] ) [address]  [place] [address]  [phone]           Routing refill request to provider for review/approval because:  pcp advise

## 2019-05-24 NOTE — TELEPHONE ENCOUNTER
gemfibrozil (LOPID) 600 MG tablet    Last Written Prescription Date:  10/25/2017  Last Fill Quantity: ?,   # refills: ?  Last Office Visit: 01/25/208  Future Office visit:    Next 5 appointments (look out 90 days)    Jun 11, 2019  2:30 PM CDT  (Arrive by 2:15 PM)  Francine Crockett MD  [facility] [address]  [place] [address]  [phone]           Routing refill request to provider for review/approval because:

## 2019-06-11 NOTE — TELEPHONE ENCOUNTER
lisinopril      Last Written Prescription Date:  5/11/18  Last Fill Quantity: 120,   # refills: 3  Last Office Visit: 4/3/18  Future Office visit: none       yes

## 2019-06-13 NOTE — PROGRESS NOTES
SUBJECTIVE:   CC: Jose J Marley is an 49 year old male who presents for preventive health visit.     Healthy Habits:    Do you get at least three servings of calcium containing foods daily (dairy, green leafy vegetables, etc.)? yes    Amount of exercise or daily activities, outside of work: 3-5 miles per week of walking     Problems taking medications regularly No    Medication side effects: No    Have you had an eye exam in the past two years? yes    Do you see a dentist twice per year? no    Do you have sleep apnea, excessive snoring or daytime drowsiness?yes    Rectal bleeding when wiping, no symptoms in 1 week. Patient has been taking a laxative   Hyperlipidemia Follow-Up      Are you having any of the following symptoms? (Select all that apply)  No complaints of shortness of breath, chest pain or pressure.  No increased sweating or nausea with activity.  No left-sided neck or arm pain.  No complaints of pain in calves when walking 1-2 blocks.    Are you regularly taking any medication or supplement to lower your cholesterol?   Yes- lipitor and lopid     Are you having muscle aches or other side effects that you think could be caused by your cholesterol lowering medication?  No      Hypertension Follow-up      Do you check your blood pressure regularly outside of the clinic? Yes     Are you following a low salt diet? No    Are your blood pressures ever more than 140 on the top number (systolic) OR more   than 90 on the bottom number (diastolic), for example 140/90? Yes    Today's PHQ-2 Score:   PHQ-2 ( 1999 [employer]) 6/25/2019 4/4/2013   Q1: Little interest or pleasure in doing things 0 0   Q2: Feeling down, depressed or hopeless 0 0   PHQ-2 Score 0 0       Abuse: Current or Past(Physical, Sexual or Emotional)- No  Do you feel safe in your environment? Yes    Social History     Tobacco Use     Smoking status: Never Smoker     Smokeless tobacco: Never Used   Substance Use Topics     Alcohol use: Yes      Comment: Rarely      If you drink alcohol do you typically have >3 drinks per day or >7 drinks per week? No                      Last PSA:   PSA   Date Value Ref Range Status   11/30/2015 0.73 0 - 4 ug/L Final       Reviewed orders with patient. Reviewed health maintenance and updated orders accordingly - Yes  BP Readings from Last 3 Encounters:   06/25/19 124/75   10/04/18 140/84   09/13/18 147/90    Wt Readings from Last 3 Encounters:   06/25/19 101.6 kg (224 lb)   10/04/18 101.8 kg (224 lb 6 oz)   09/13/18 104.4 kg (230 lb 1.6 oz)                  Patient Active Problem List   Diagnosis     Preventative health care     Essential hypertension     Mixed hyperlipidemia     ZEB (obstructive sleep apnea)     Morbid obesity (H)     Family history of premature CAD     Morbid (severe) obesity due to excess calories (H)     Past Surgical History:   Procedure Laterality Date     EXCISE MASS FINGER Right 4/21/2016    Procedure: EXCISE MASS FINGER;  Surgeon: Bimal Mitchell MD;  Location: [facility]     HERNIA REPAIR  2011    umbilical     HERNIA REPAIR  1972    inguinal     LAPAROSCOPIC GASTRIC SLEEVE N/A 8/28/2018    Procedure: LAPAROSCOPIC GASTRIC SLEEVE;  Laparoscopic Sleeve Gastrectomy ;  Surgeon: Santosh Sandy MD;  Location:  OR       Social History     Tobacco Use     Smoking status: Never Smoker     Smokeless tobacco: Never Used   Substance Use Topics     Alcohol use: Yes     Comment: Rarely      Family History   Problem Relation Age of Onset     Myocardial Infarction Maternal Grandmother 67        cause of death     Hypertension Mother      Hyperlipidemia Mother      Myocardial Infarction Father 45     Coronary Artery Disease Early Onset Father      Hypertension Paternal Half-Brother          Current Outpatient Medications   Medication Sig Dispense Refill     acetaminophen (TYLENOL) 325 MG tablet Take 2 tablets (650 mg) by mouth every 4 hours as needed for other (multimodal surgical pain management along  with NSAIDS and opioid medication as indicated based on pain control and physical function.) 100 tablet 1     atorvastatin (LIPITOR) 10 MG tablet TAKE 1 TABLET BY MOUTH  DAILY 90 tablet 3     Cholecalciferol (VITAMIN D3) 1000 units CHEW        gemfibrozil (LOPID) 600 MG tablet Take by mouth daily  0     gemfibrozil (LOPID) 600 MG tablet TAKE 1 TABLET BY MOUTH TWO  TIMES DAILY 60 tablet 0     hyoscyamine (ANASPAZ/LEVSIN) 0.125 MG tablet Take 1-2 tablets (125-250 mcg) by mouth every 4 hours as needed for cramping 60 tablet 1     lisinopril (PRINIVIL/ZESTRIL) 20 MG tablet TAKE 1 TABLET BY MOUTH TWO  TIMES DAILY 180 tablet 1     metoprolol (LOPRESSOR) 10 mg/mL SUSP Take 5 mLs (50 mg) by mouth 2 times daily 400 mL 0     metoprolol succinate ER (TOPROL-XL) 100 MG 24 hr tablet TAKE 1 TABLET BY MOUTH  DAILY 90 tablet 0     Multiple Vitamins-Minerals (MULTIVITAMIN ADULT PO) Take by mouth daily       omeprazole (PRILOSEC) 20 MG CR capsule Take 1 capsule (20 mg) by mouth 2 times daily 20 capsule 1     ursodiol (ACTIGALL) 300 MG capsule Take 1 capsule (300 mg) by mouth 2 times daily 60 capsule 4     No Known Allergies    Reviewed and updated as needed this visit by clinical staff  Tobacco  Allergies  Meds  Med Hx  Surg Hx  Fam Hx  Soc Hx        Reviewed and updated as needed this visit by Provider            ROS:  CONSTITUTIONAL: NEGATIVE for fever, chills, change in weight  INTEGUMENTARY/SKIN: NEGATIVE for worrisome rashes, moles or lesions  EYES: NEGATIVE for vision changes or irritation  ENT: NEGATIVE for ear, mouth and throat problems  RESP: NEGATIVE for significant cough or SOB  CV: NEGATIVE for chest pain, palpitations or peripheral edema  GI: NEGATIVE for nausea, abdominal pain, heartburn, or change in bowel habits   male: negative for dysuria, hematuria, decreased urinary stream, erectile dysfunction, urethral discharge  MUSCULOSKELETAL: NEGATIVE for significant arthralgias or myalgia  NEURO: NEGATIVE for  weakness, dizziness or paresthesias  PSYCHIATRIC: NEGATIVE for changes in mood or affect    OBJECTIVE:   /75 (BP Location: Right arm, Patient Position: Chair, Cuff Size: Adult Large)   Pulse 78   Temp 98  F (36.7  C) (Tympanic)   Wt 101.6 kg (224 lb)   SpO2 98%   BMI 36.70 kg/m    EXAM:  GENERAL: healthy, alert and no distress  EYES: Eyes grossly normal to inspection, PERRL and conjunctivae and sclerae normal  HENT: ear canals and TM's normal, nose and mouth without ulcers or lesions  NECK: no adenopathy, no asymmetry, masses, or scars and thyroid normal to palpation  RESP: lungs clear to auscultation - no rales, rhonchi or wheezes  CV: regular rate and rhythm, normal S1 S2, no S3 or S4, no murmur, click or rub, no peripheral edema and peripheral pulses strong  ABDOMEN: soft, nontender, no hepatosplenomegaly, no masses and bowel sounds normal   (male): normal male genitalia without lesions or urethral discharge, no hernia  RECTAL: Inspection notes no obvious hemorrhoid or active bleeding  MS: no gross musculoskeletal defects noted, no edema  SKIN: no suspicious lesions or rashes  NEURO: Normal strength and tone, mentation intact and speech normal  PSYCH: mentation appears normal, affect normal/bright    Diagnostic Test Results:  Results for orders placed or performed in visit on 06/25/19 (from the past 24 hour(s))   Hemoglobin A1c   Result Value Ref Range    Hemoglobin A1C 5.8 (H) 0 - 5.6 %   Lipid Profile   Result Value Ref Range    Cholesterol 171 <200 mg/dL    Triglycerides 146 <150 mg/dL    HDL Cholesterol 43 >39 mg/dL    LDL Cholesterol Calculated 99 <100 mg/dL    Non HDL Cholesterol 128 <130 mg/dL   Comprehensive metabolic panel   Result Value Ref Range    Sodium 142 133 - 144 mmol/L    Potassium 3.9 3.4 - 5.3 mmol/L    Chloride 108 94 - 109 mmol/L    Carbon Dioxide 31 20 - 32 mmol/L    Anion Gap 3 3 - 14 mmol/L    Glucose 87 70 - 99 mg/dL    Urea Nitrogen 16 7 - 30 mg/dL    Creatinine 0.81 0.66 -  1.25 mg/dL    GFR Estimate >90 >60 mL/min/[1.73_m2]    GFR Estimate If Black >90 >60 mL/min/[1.73_m2]    Calcium 9.1 8.5 - 10.1 mg/dL    Bilirubin Total 0.4 0.2 - 1.3 mg/dL    Albumin 4.0 3.4 - 5.0 g/dL    Protein Total 7.7 6.8 - 8.8 g/dL    Alkaline Phosphatase 86 40 - 150 U/L    ALT 19 0 - 70 U/L    AST 10 0 - 45 U/L   CBC with platelets differential   Result Value Ref Range    WBC 7.4 4.0 - 11.0 10e9/L    RBC Count 5.30 4.4 - 5.9 10e12/L    Hemoglobin 12.9 (L) 13.3 - 17.7 g/dL    Hematocrit 40.6 40.0 - 53.0 %    MCV 77 (L) 78 - 100 fl    MCH 24.3 (L) 26.5 - 33.0 pg    MCHC 31.8 31.5 - 36.5 g/dL    RDW 15.1 (H) 10.0 - 15.0 %    Platelet Count 310 150 - 450 10e9/L    Diff Method Automated Method     % Neutrophils 64.5 %    % Lymphocytes 25.5 %    % Monocytes 7.4 %    % Eosinophils 1.6 %    % Basophils 0.7 %    % Immature Granulocytes 0.3 %    Nucleated RBCs 0 0 /100    Absolute Neutrophil 4.8 1.6 - 8.3 10e9/L    Absolute Lymphocytes 1.9 0.8 - 5.3 10e9/L    Absolute Monocytes 0.6 0.0 - 1.3 10e9/L    Absolute Eosinophils 0.1 0.0 - 0.7 10e9/L    Absolute Basophils 0.1 0.0 - 0.2 10e9/L    Abs Immature Granulocytes 0.0 0 - 0.4 10e9/L    Absolute Nucleated RBC 0.0    Estimated Average Glucose   Result Value Ref Range    Estimated Average Glucose 120 mg/dL       ASSESSMENT/PLAN:       ICD-10-CM    1. Routine general medical examination at a health care facility Z00.00    2. Anemia, unspecified type D64.9 CBC with platelets differential   3. Mixed hyperlipidemia E78.2 gemfibrozil (LOPID) 600 MG tablet   4. Morbid obesity (H) E66.01    He has done well following his gastric sleeve procedure.  Recently did have some rectal bleeding he figured out that it was probably a fissure he increase fiber and fluids and has no further rectal pain and has not seen any rectal bleeding.  He does have a little bit of a microcytic anemia.  We will recheck CBC in 6 weeks.  He has been taking Lopid once a day he also is on a statin he knows  "of the wrist but it is really brought down his hyperlipidemia well.  He will continue to work hard with diet and exercise.  His lipids are improved and CMP is good.    COUNSELING:  Reviewed preventive health counseling, as reflected in patient instructions       Regular exercise       Colon cancer screening       Prostate cancer screening    Estimated body mass index is 36.76 kg/m  as calculated from the following:    Height as of 10/4/18: 1.664 m (5' 5.51\").    Weight as of 10/4/18: 101.8 kg (224 lb 6 oz).    Weight management plan: Patient underwent gastric sleeve     reports that he has never smoked. He has never used smokeless tobacco.      Counseling Resources:  ATP IV Guidelines  Pooled Cohorts Equation Calculator  FRAX Risk Assessment  ICSI Preventive Guidelines  Dietary Guidelines for Americans, 2010  USDA's MyPlate  ASA Prophylaxis  Lung CA Screening    R Arnold Crockett MD  [facility] [place]  "

## 2019-06-25 ENCOUNTER — OFFICE VISIT (OUTPATIENT)
Dept: FAMILY MEDICINE | Facility: OTHER | Age: 49
End: 2019-06-25
Attending: FAMILY MEDICINE
Payer: COMMERCIAL

## 2019-06-25 VITALS
OXYGEN SATURATION: 98 % | WEIGHT: 224 LBS | SYSTOLIC BLOOD PRESSURE: 124 MMHG | BODY MASS INDEX: 36.7 KG/M2 | HEART RATE: 78 BPM | DIASTOLIC BLOOD PRESSURE: 75 MMHG | TEMPERATURE: 98 F

## 2019-06-25 DIAGNOSIS — E66.01 MORBID (SEVERE) OBESITY DUE TO EXCESS CALORIES (H): ICD-10-CM

## 2019-06-25 DIAGNOSIS — E78.2 MIXED HYPERLIPIDEMIA: ICD-10-CM

## 2019-06-25 DIAGNOSIS — Z98.84 S/P GASTRIC BYPASS: ICD-10-CM

## 2019-06-25 DIAGNOSIS — D64.9 ANEMIA, UNSPECIFIED TYPE: ICD-10-CM

## 2019-06-25 DIAGNOSIS — R73.03 PRE-DIABETES: ICD-10-CM

## 2019-06-25 DIAGNOSIS — I10 ESSENTIAL HYPERTENSION: ICD-10-CM

## 2019-06-25 DIAGNOSIS — Z00.00 ROUTINE HISTORY AND PHYSICAL EXAMINATION OF ADULT: ICD-10-CM

## 2019-06-25 DIAGNOSIS — E66.01 MORBID OBESITY (H): ICD-10-CM

## 2019-06-25 DIAGNOSIS — Z00.00 ROUTINE GENERAL MEDICAL EXAMINATION AT A HEALTH CARE FACILITY: Primary | ICD-10-CM

## 2019-06-25 LAB
ALBUMIN SERPL-MCNC: 4 G/DL (ref 3.4–5)
ALP SERPL-CCNC: 86 U/L (ref 40–150)
ALT SERPL W P-5'-P-CCNC: 19 U/L (ref 0–70)
ANION GAP SERPL CALCULATED.3IONS-SCNC: 3 MMOL/L (ref 3–14)
AST SERPL W P-5'-P-CCNC: 10 U/L (ref 0–45)
BASOPHILS # BLD AUTO: 0.1 10E9/L (ref 0–0.2)
BASOPHILS NFR BLD AUTO: 0.7 %
BILIRUB SERPL-MCNC: 0.4 MG/DL (ref 0.2–1.3)
BUN SERPL-MCNC: 16 MG/DL (ref 7–30)
CALCIUM SERPL-MCNC: 9.1 MG/DL (ref 8.5–10.1)
CHLORIDE SERPL-SCNC: 108 MMOL/L (ref 94–109)
CHOLEST SERPL-MCNC: 171 MG/DL
CO2 SERPL-SCNC: 31 MMOL/L (ref 20–32)
CREAT SERPL-MCNC: 0.81 MG/DL (ref 0.66–1.25)
DIFFERENTIAL METHOD BLD: ABNORMAL
EOSINOPHIL # BLD AUTO: 0.1 10E9/L (ref 0–0.7)
EOSINOPHIL NFR BLD AUTO: 1.6 %
ERYTHROCYTE [DISTWIDTH] IN BLOOD BY AUTOMATED COUNT: 15.1 % (ref 10–15)
EST. AVERAGE GLUCOSE BLD GHB EST-MCNC: 120 MG/DL
FOLATE SERPL-MCNC: 31.7 NG/ML
GFR SERPL CREATININE-BSD FRML MDRD: >90 ML/MIN/{1.73_M2}
GLUCOSE SERPL-MCNC: 87 MG/DL (ref 70–99)
HBA1C MFR BLD: 5.8 % (ref 0–5.6)
HCT VFR BLD AUTO: 40.6 % (ref 40–53)
HDLC SERPL-MCNC: 43 MG/DL
HGB BLD-MCNC: 12.9 G/DL (ref 13.3–17.7)
IMM GRANULOCYTES # BLD: 0 10E9/L (ref 0–0.4)
IMM GRANULOCYTES NFR BLD: 0.3 %
LDLC SERPL CALC-MCNC: 99 MG/DL
LYMPHOCYTES # BLD AUTO: 1.9 10E9/L (ref 0.8–5.3)
LYMPHOCYTES NFR BLD AUTO: 25.5 %
MCH RBC QN AUTO: 24.3 PG (ref 26.5–33)
MCHC RBC AUTO-ENTMCNC: 31.8 G/DL (ref 31.5–36.5)
MCV RBC AUTO: 77 FL (ref 78–100)
MONOCYTES # BLD AUTO: 0.6 10E9/L (ref 0–1.3)
MONOCYTES NFR BLD AUTO: 7.4 %
NEUTROPHILS # BLD AUTO: 4.8 10E9/L (ref 1.6–8.3)
NEUTROPHILS NFR BLD AUTO: 64.5 %
NONHDLC SERPL-MCNC: 128 MG/DL
NRBC # BLD AUTO: 0 10*3/UL
NRBC BLD AUTO-RTO: 0 /100
PLATELET # BLD AUTO: 310 10E9/L (ref 150–450)
POTASSIUM SERPL-SCNC: 3.9 MMOL/L (ref 3.4–5.3)
PROT SERPL-MCNC: 7.7 G/DL (ref 6.8–8.8)
RBC # BLD AUTO: 5.3 10E12/L (ref 4.4–5.9)
SODIUM SERPL-SCNC: 142 MMOL/L (ref 133–144)
TRIGL SERPL-MCNC: 146 MG/DL
VIT B12 SERPL-MCNC: 1619 PG/ML (ref 193–986)
WBC # BLD AUTO: 7.4 10E9/L (ref 4–11)

## 2019-06-25 PROCEDURE — 80061 LIPID PANEL: CPT | Performed by: FAMILY MEDICINE

## 2019-06-25 PROCEDURE — 82306 VITAMIN D 25 HYDROXY: CPT | Mod: 90 | Performed by: FAMILY MEDICINE

## 2019-06-25 PROCEDURE — 85025 COMPLETE CBC W/AUTO DIFF WBC: CPT | Performed by: FAMILY MEDICINE

## 2019-06-25 PROCEDURE — 82746 ASSAY OF FOLIC ACID SERUM: CPT | Mod: 90 | Performed by: FAMILY MEDICINE

## 2019-06-25 PROCEDURE — 36415 COLL VENOUS BLD VENIPUNCTURE: CPT | Performed by: FAMILY MEDICINE

## 2019-06-25 PROCEDURE — 83036 HEMOGLOBIN GLYCOSYLATED A1C: CPT | Performed by: FAMILY MEDICINE

## 2019-06-25 PROCEDURE — 82607 VITAMIN B-12: CPT | Mod: 90 | Performed by: FAMILY MEDICINE

## 2019-06-25 PROCEDURE — 99396 PREV VISIT EST AGE 40-64: CPT | Performed by: FAMILY MEDICINE

## 2019-06-25 PROCEDURE — 40000788 ZZHCL STATISTIC ESTIMATED AVERAGE GLUCOSE: Performed by: FAMILY MEDICINE

## 2019-06-25 PROCEDURE — 80053 COMPREHEN METABOLIC PANEL: CPT | Performed by: FAMILY MEDICINE

## 2019-06-25 PROCEDURE — 99000 SPECIMEN HANDLING OFFICE-LAB: CPT | Performed by: FAMILY MEDICINE

## 2019-06-25 RX ORDER — GEMFIBROZIL 600 MG/1
TABLET, FILM COATED ORAL DAILY
Refills: 0 | COMMUNITY
Start: 2019-06-25 | End: 2021-10-01

## 2019-06-25 ASSESSMENT — PATIENT HEALTH QUESTIONNAIRE - PHQ9: SUM OF ALL RESPONSES TO PHQ QUESTIONS 1-9: 0

## 2019-06-25 ASSESSMENT — ANXIETY QUESTIONNAIRES
4. TROUBLE RELAXING: NOT AT ALL
3. WORRYING TOO MUCH ABOUT DIFFERENT THINGS: NOT AT ALL
GAD7 TOTAL SCORE: 0
5. BEING SO RESTLESS THAT IT IS HARD TO SIT STILL: NOT AT ALL
1. FEELING NERVOUS, ANXIOUS, OR ON EDGE: NOT AT ALL
6. BECOMING EASILY ANNOYED OR IRRITABLE: NOT AT ALL
2. NOT BEING ABLE TO STOP OR CONTROL WORRYING: NOT AT ALL
7. FEELING AFRAID AS IF SOMETHING AWFUL MIGHT HAPPEN: NOT AT ALL

## 2019-06-25 ASSESSMENT — PAIN SCALES - GENERAL: PAINLEVEL: NO PAIN (0)

## 2019-06-25 NOTE — NURSING NOTE
"Chief Complaint   Patient presents with     Physical       Initial /75 (BP Location: Right arm, Patient Position: Chair, Cuff Size: Adult Large)   Pulse 78   Temp 98  F (36.7  C) (Tympanic)   Wt 101.6 kg (224 lb)   SpO2 98%   BMI 36.70 kg/m   Estimated body mass index is 36.7 kg/m  as calculated from the following:    Height as of 10/4/18: 1.664 m (5' 5.51\").    Weight as of this encounter: 101.6 kg (224 lb).  Medication Reconciliation: complete    TOMI MEANS LPN       "

## 2019-06-26 LAB — DEPRECATED CALCIDIOL+CALCIFEROL SERPL-MC: 26 UG/L (ref 20–75)

## 2019-06-26 ASSESSMENT — ANXIETY QUESTIONNAIRES: GAD7 TOTAL SCORE: 0

## 2019-07-15 DIAGNOSIS — E78.2 ELEVATED TRIGLYCERIDES WITH HIGH CHOLESTEROL: ICD-10-CM

## 2019-07-15 RX ORDER — GEMFIBROZIL 600 MG/1
TABLET, FILM COATED ORAL
Qty: 60 TABLET | Refills: 0 | Status: SHIPPED | OUTPATIENT
Start: 2019-07-15 | End: 2021-10-01

## 2019-07-17 DIAGNOSIS — E78.2 MIXED HYPERLIPIDEMIA: ICD-10-CM

## 2019-07-17 RX ORDER — GEMFIBROZIL 600 MG/1
600 TABLET, FILM COATED ORAL 2 TIMES DAILY
Qty: 60 TABLET | Refills: 1 | OUTPATIENT
Start: 2019-07-17

## 2019-09-07 DIAGNOSIS — I10 ESSENTIAL HYPERTENSION: ICD-10-CM

## 2019-09-11 RX ORDER — METOPROLOL SUCCINATE 100 MG/1
TABLET, EXTENDED RELEASE ORAL
Qty: 90 TABLET | Refills: 1 | Status: SHIPPED | OUTPATIENT
Start: 2019-09-11 | End: 2020-03-23

## 2019-09-11 NOTE — TELEPHONE ENCOUNTER
Metoprolol succinate  Last Written Prescription Date: 5/23/19  Last Fill Quantity: 90 # of Refills: 0  Last Office Visit: 6/25/19

## 2019-10-11 DIAGNOSIS — E78.2 MIXED HYPERLIPIDEMIA: Primary | ICD-10-CM

## 2019-10-14 RX ORDER — GEMFIBROZIL 600 MG/1
TABLET, FILM COATED ORAL
Qty: 60 TABLET | Refills: 1 | Status: SHIPPED | OUTPATIENT
Start: 2019-10-14 | End: 2020-01-16

## 2019-11-04 ENCOUNTER — HOSPITAL ENCOUNTER (EMERGENCY)
Facility: HOSPITAL | Age: 49
Discharge: HOME | End: 2019-11-04
Attending: FAMILY MEDICINE | Admitting: FAMILY MEDICINE
Payer: COMMERCIAL

## 2019-11-04 VITALS
SYSTOLIC BLOOD PRESSURE: 145 MMHG | OXYGEN SATURATION: 98 % | TEMPERATURE: 97.2 F | HEART RATE: 59 BPM | DIASTOLIC BLOOD PRESSURE: 92 MMHG | RESPIRATION RATE: 17 BRPM

## 2019-11-04 DIAGNOSIS — G43.009 NONINTRACTABLE MIGRAINE, UNSPECIFIED MIGRAINE TYPE: ICD-10-CM

## 2019-11-04 DIAGNOSIS — H83.09 VIRAL LABYRINTHITIS, UNSPECIFIED LATERALITY: ICD-10-CM

## 2019-11-04 PROCEDURE — 96361 HYDRATE IV INFUSION ADD-ON: CPT

## 2019-11-04 PROCEDURE — 25000128 H RX IP 250 OP 636: Performed by: FAMILY MEDICINE

## 2019-11-04 PROCEDURE — 96374 THER/PROPH/DIAG INJ IV PUSH: CPT

## 2019-11-04 PROCEDURE — 25800030 ZZH RX IP 258 OP 636: Performed by: FAMILY MEDICINE

## 2019-11-04 PROCEDURE — 99284 EMERGENCY DEPT VISIT MOD MDM: CPT | Mod: 25

## 2019-11-04 PROCEDURE — 96375 TX/PRO/DX INJ NEW DRUG ADDON: CPT

## 2019-11-04 PROCEDURE — 99284 EMERGENCY DEPT VISIT MOD MDM: CPT | Mod: Z6 | Performed by: FAMILY MEDICINE

## 2019-11-04 RX ORDER — KETOROLAC TROMETHAMINE 30 MG/ML
30 INJECTION, SOLUTION INTRAMUSCULAR; INTRAVENOUS ONCE
Status: COMPLETED | OUTPATIENT
Start: 2019-11-04 | End: 2019-11-04

## 2019-11-04 RX ORDER — DIPHENHYDRAMINE HYDROCHLORIDE 50 MG/ML
25 INJECTION INTRAMUSCULAR; INTRAVENOUS ONCE
Status: COMPLETED | OUTPATIENT
Start: 2019-11-04 | End: 2019-11-04

## 2019-11-04 RX ORDER — MECLIZINE HCL 12.5 MG 12.5 MG/1
25 TABLET ORAL 4 TIMES DAILY PRN
Qty: 30 TABLET | Refills: 0 | COMMUNITY
Start: 2019-11-04 | End: 2021-10-01

## 2019-11-04 RX ADMIN — KETOROLAC TROMETHAMINE 30 MG: 30 INJECTION, SOLUTION INTRAMUSCULAR at 17:36

## 2019-11-04 RX ADMIN — PROCHLORPERAZINE EDISYLATE 10 MG: 5 INJECTION INTRAMUSCULAR; INTRAVENOUS at 17:36

## 2019-11-04 RX ADMIN — DIPHENHYDRAMINE HYDROCHLORIDE 25 MG: 50 INJECTION, SOLUTION INTRAMUSCULAR; INTRAVENOUS at 17:35

## 2019-11-04 RX ADMIN — SODIUM CHLORIDE 1000 ML: 9 INJECTION, SOLUTION INTRAVENOUS at 17:34

## 2019-11-04 NOTE — ED NOTES
"50 y/o male presents with report of a \"vice like\" headache, dizzy spells and episodes of blurred vision. Denies blurred vision during this writers assessment. States he has had a headache all day long. Reports last dizzy spell was at 1500ish, reports it lasted for \"a few minutes.\" Has had 3 in the last week. Also reports episodes of arm numbness in the last month.  Postive BEFAST.   "

## 2019-11-04 NOTE — ED AVS SNAPSHOT
[facility]  [address] [place] [address]  Phone:  [phone]                                    Jose J Marley   MRN: [id number]    Department:  [facility]   Date of Visit:  11/4/2019           After Visit Summary Signature Page    I have received my discharge instructions, and my questions have been answered. I have discussed any challenges I see with this plan with the nurse or doctor.    ..........................................................................................................................................  Patient/Patient Representative Signature      ..........................................................................................................................................  Patient Representative Print Name and Relationship to Patient    ..................................................               ................................................  Date                                   Time    ..........................................................................................................................................  Reviewed by Signature/Title    ...................................................              ..............................................  Date                                               Time          22EPIC Rev 08/18

## 2019-11-05 NOTE — ED PROVIDER NOTES
"  History     Chief Complaint   Patient presents with     Headache     States \"my head feels like it's been in a vice all day.\" States took tylenol 1000 mg and aspirin 162 mg.      Dizziness     states has had 2 dizzy spells since Saturday.      Eye Problem     c/o blurry vision that started this afternoon.      HPI  Jose J Marley is a 49 year old male who presents to the ED with a \"vice-like\" headache that started this morning on waking, and was followed by blurred vision.  He has also had intermittent dizzy spells that he describes as spinning, last had one of these about 3 hours PTA.  These spinning episodes have occurred 3 times in the last week, and last for a few minutes each time.  He also reports episodes of arm \"numbness\" over the last month.  He denies a history of migraines, states he rarely gets headaches at all.    Allergies:  No Known Allergies    Problem List:    Patient Active Problem List    Diagnosis Date Noted     Morbid (severe) obesity due to excess calories (H) 08/28/2018     Priority: Medium     Family history of premature CAD 04/03/2018     Priority: Medium     Morbid obesity (H) 12/02/2015     Priority: Medium     ZEB (obstructive sleep apnea) 11/30/2015     Priority: Medium     Preventative health care 04/04/2013     Priority: Medium     Mixed hyperlipidemia 08/04/2008     Priority: Medium     Essential hypertension 12/20/2006     Priority: Medium     Problem list name updated by automated process. Provider to review          Past Medical History:    Past Medical History:   Diagnosis Date     Obesity, unspecified 01/01/2011     Prediabetes      Pure hypercholesterolemia 08/04/2008     Sleep apnea      Unspecified essential hypertension 12/20/2006       Past Surgical History:    Past Surgical History:   Procedure Laterality Date     EXCISE MASS FINGER Right 4/21/2016    Procedure: EXCISE MASS FINGER;  Surgeon: Bimal Mitchell MD;  Location: [facility]     HERNIA REPAIR  2011    umbilical "     HERNIA REPAIR  1972    inguinal     LAPAROSCOPIC GASTRIC SLEEVE N/A 8/28/2018    Procedure: LAPAROSCOPIC GASTRIC SLEEVE;  Laparoscopic Sleeve Gastrectomy ;  Surgeon: Santosh Sandy MD;  Location:  OR       Family History:    Family History   Problem Relation Age of Onset     Myocardial Infarction Maternal Grandmother 67        cause of death     Hypertension Mother      Hyperlipidemia Mother      Myocardial Infarction Father 45     Coronary Artery Disease Early Onset Father      Hypertension Paternal Half-Brother        Social History:  Marital Status:   [2]  Social History     Tobacco Use     Smoking status: Never Smoker     Smokeless tobacco: Never Used   Substance Use Topics     Alcohol use: Yes     Comment: Rarely      Drug use: No        Medications:    meclizine (ANTIVERT) 12.5 MG tablet  acetaminophen (TYLENOL) 325 MG tablet  atorvastatin (LIPITOR) 10 MG tablet  Cholecalciferol (VITAMIN D3) 1000 units CHEW  gemfibrozil (LOPID) 600 MG tablet  gemfibrozil (LOPID) 600 MG tablet  gemfibrozil (LOPID) 600 MG tablet  hyoscyamine (ANASPAZ/LEVSIN) 0.125 MG tablet  lisinopril (PRINIVIL/ZESTRIL) 20 MG tablet  metoprolol (LOPRESSOR) 10 mg/mL SUSP  metoprolol succinate ER (TOPROL-XL) 100 MG 24 hr tablet  Multiple Vitamins-Minerals (MULTIVITAMIN ADULT PO)  omeprazole (PRILOSEC) 20 MG CR capsule  ursodiol (ACTIGALL) 300 MG capsule          Review of Systems   Constitutional: Positive for activity change. Negative for fatigue and fever.   HENT: Negative for congestion, sinus pressure and sinus pain.    Respiratory: Negative for shortness of breath.    Cardiovascular: Negative for chest pain and palpitations.   Gastrointestinal: Negative for abdominal pain, constipation, diarrhea, nausea and vomiting.   Genitourinary: Negative.    Musculoskeletal: Negative.    Skin: Negative.    Neurological: Positive for dizziness, numbness and headaches.        See HPI   Psychiatric/Behavioral: The patient is  nervous/anxious.        Physical Exam   BP: 161/100  Pulse: 61  Heart Rate: 63  Temp: 97  F (36.1  C)  Resp: 16  SpO2: 100 %      Physical Exam  Vitals signs and nursing note reviewed.   Constitutional:       General: He is not in acute distress.     Appearance: He is ill-appearing. He is not diaphoretic.   HENT:      Head: Normocephalic and atraumatic.      Right Ear: A middle ear effusion is present.      Left Ear: A middle ear effusion is present.   Eyes:      Extraocular Movements: Extraocular movements intact.      Pupils: Pupils are equal, round, and reactive to light.   Neck:      Musculoskeletal: Normal range of motion and neck supple.   Cardiovascular:      Rate and Rhythm: Regular rhythm. Bradycardia present.      Pulses: Normal pulses.      Heart sounds: Normal heart sounds. No murmur.   Pulmonary:      Effort: Pulmonary effort is normal. No respiratory distress.      Breath sounds: Normal breath sounds.   Abdominal:      General: Bowel sounds are normal. There is no distension.      Palpations: Abdomen is soft.      Tenderness: There is no tenderness.   Musculoskeletal: Normal range of motion.   Skin:     General: Skin is warm and dry.      Capillary Refill: Capillary refill takes less than 2 seconds.   Neurological:      General: No focal deficit present.      Mental Status: He is alert.      Cranial Nerves: No cranial nerve deficit.      Sensory: No sensory deficit.      Motor: No weakness.      Coordination: Coordination normal.      Gait: Gait normal.   Psychiatric:         Mood and Affect: Mood is anxious. Affect is blunt.         Speech: Speech normal.         Cognition and Memory: Cognition normal.         ED Course        Procedures      No results found for this or any previous visit (from the past 24 hour(s)).    Medications   0.9% sodium chloride BOLUS (0 mLs Intravenous Stopped 11/4/19 1847)   prochlorperazine (COMPAZINE) injection 10 mg (10 mg Intravenous Given 11/4/19 1736)    diphenhydrAMINE (BENADRYL) injection 25 mg (25 mg Intravenous Given 11/4/19 8902)   ketorolac (TORADOL) injection 30 mg (30 mg Intravenous Given 11/4/19 7979)       Assessments & Plan (with Medical Decision Making)   Headache completely gone, no further numbness in arm, dizziness or blurred vision.  Suspect anxiety plays a part in these symptoms, however patient may also have a complex migraine coupled with viral labyrinthitis.  Given rx for Meclizine and instructed to take it q6h PRN.  He should follow up with Dr. Crockett as needed.    I have reviewed the nursing notes.    I have reviewed the findings, diagnosis, plan and need for follow up with the patient.    Discharge Medication List as of 11/4/2019  6:48 PM      START taking these medications    Details   meclizine (ANTIVERT) 12.5 MG tablet Take 2 tablets (25 mg) by mouth 4 times daily as needed for dizziness, Disp-30 tablet, R-0, OTC             Final diagnoses:   Viral labyrinthitis, unspecified laterality   Nonintractable migraine, unspecified migraine type       11/4/2019   [facility]     Diane Craven MD  11/07/19 3244

## 2019-11-05 NOTE — ED NOTES
Discharge instructions given. Verbalized understanding. Denies pain. States headache is completely gone and feels much better. Ambulated out of ED independently.

## 2019-11-07 ASSESSMENT — ENCOUNTER SYMPTOMS
VOMITING: 0
NAUSEA: 0
FATIGUE: 0
NUMBNESS: 1
FEVER: 0
SHORTNESS OF BREATH: 0
ACTIVITY CHANGE: 1
NERVOUS/ANXIOUS: 1
CONSTIPATION: 0
SINUS PAIN: 0
SINUS PRESSURE: 0
HEADACHES: 1
MUSCULOSKELETAL NEGATIVE: 1
DIARRHEA: 0
ABDOMINAL PAIN: 0
PALPITATIONS: 0
DIZZINESS: 1

## 2019-11-27 NOTE — PROGRESS NOTES
"  Referring provider:       2/7/2018   Who referred you? KRISTINE BOYD     I was asked to see the patient regarding obesity by the referring provider above.    I had the pleasure of meeting with your patient Jose J Marley in our weight loss surgery office.  This patient is a 48 year old male who has been undergoing our thorough preoperative screening process in anticipation of potential bariatric surgery.    At initial evaluation we recorded Jose J Marley's height of 5' 5.5\", a weight of 255 lbs 11.2 oz, and calculated Body mass index of 41.9 kg/(m^2).  The patient has been unsuccessful with other methods of permanent weight loss and suffers from multiple weight related medical conditions.  Due to lack of success in achieving weight loss through other methods, he is interested in undergoing bariatric surgery.    Main illnesses of obesity include prediabetes (probably type 2 diabetes since A1c was 6.5% in January), hypertension, obstructive sleep apnea, and hypercholesterolemia.      PREVIOUS WEIGHT LOSS ATTEMPTS:     2/7/2018   I have tried the following methods to lose weight Exercise, Atkins type diet (low carb/high protein), Prescription Medications       CO-MORBIDITIES OF OBESITY INCLUDE:     2/7/2018   I have the following co-morbidities associated with obesity: Pre-Diabetes, High Blood Pressure, High Cholesterol, Sleep Apnea   Do you use a CPAP? Yes       VITALS:  BP (!) 155/92 (BP Location: Left arm, Patient Position: Sitting, Cuff Size: Adult Regular)  Pulse 62  Temp 98.4  F (36.9  C) (Oral)  Ht 5' 6\"  Wt 234 lb 14.4 oz  SpO2 97%  BMI 37.91 kg/m2    PE:  GENERAL: Alert and oriented x3. NAD  HEENT exam: Sclerae not icteric. Hearing good. Head normocephalic and atraumatic.   CARDIOVASCULAR: No JVD  RESPIRATORY: Breathing unlabored  GASTROINTESTINAL: Obese  LOWER EXTREMITIES: no deformities  MUSCULOSKELETAL: Normal gait, Moves all 4 extremities equal and strong  NEUROLOGIC: no gross defect  SKIN: " warm and dry to touch     In summary, he has undergone an appropriate medical evaluation, dietitian evaluation, as well as psychologic screening. The patient appears to be an appropriate candidate for bariatric surgery.    In the office today, I discussed the laparoscopic gastric sleeve surgery.  Risks, benefits and anticipated outcomes were outlined including the risk of death, staple line leak (1-2%), PE, DVT, ulcer, worsening GERD, N/V, stricture, hernia, wound infection, weight regain, and vitamin deficiencies. This patient has a good chance of sustaining permanent weight loss due to this procedure.  This should also allow improvement if not resolution of his/her weight related medical conditions.    At present we are going to present your patient's file for prior authorization to insurance.  Pending prior authorization, I anticipate a surgical date in the near future.  We will keep you updated on any progress.  If you have questions regarding care please feel free to contact me.  Total time spent in the clinic was 20 minutes with greater than 50% in face-to-face consultation.        Sincerely,    Santosh Sandy    Please route or send letter to:  Primary Care Provider (PCP) and Referring Provider   Mag CALZADA

## 2019-12-04 DIAGNOSIS — I10 ESSENTIAL HYPERTENSION: ICD-10-CM

## 2019-12-04 DIAGNOSIS — R73.03 PRE-DIABETES: ICD-10-CM

## 2019-12-06 RX ORDER — LISINOPRIL 20 MG/1
TABLET ORAL
Qty: 180 TABLET | Refills: 0 | Status: SHIPPED | OUTPATIENT
Start: 2019-12-06 | End: 2020-03-23

## 2020-01-15 DIAGNOSIS — E78.2 MIXED HYPERLIPIDEMIA: ICD-10-CM

## 2020-01-16 RX ORDER — GEMFIBROZIL 600 MG/1
TABLET, FILM COATED ORAL
Qty: 120 TABLET | Refills: 3 | Status: SHIPPED | OUTPATIENT
Start: 2020-01-16 | End: 2020-09-30

## 2020-01-21 DIAGNOSIS — G47.33 OBSTRUCTIVE SLEEP APNEA (ADULT) (PEDIATRIC): Primary | ICD-10-CM

## 2020-03-02 ENCOUNTER — HEALTH MAINTENANCE LETTER (OUTPATIENT)
Age: 50
End: 2020-03-02

## 2020-03-22 DIAGNOSIS — I10 ESSENTIAL HYPERTENSION: ICD-10-CM

## 2020-03-22 DIAGNOSIS — R73.03 PRE-DIABETES: ICD-10-CM

## 2020-03-22 DIAGNOSIS — E78.00 PURE HYPERCHOLESTEROLEMIA: ICD-10-CM

## 2020-03-23 RX ORDER — METOPROLOL SUCCINATE 100 MG/1
TABLET, EXTENDED RELEASE ORAL
Qty: 90 TABLET | Refills: 1 | Status: SHIPPED | OUTPATIENT
Start: 2020-03-23 | End: 2020-09-30

## 2020-03-23 RX ORDER — ATORVASTATIN CALCIUM 10 MG/1
TABLET, FILM COATED ORAL
Qty: 90 TABLET | Refills: 3 | Status: SHIPPED | OUTPATIENT
Start: 2020-03-23 | End: 2021-03-22

## 2020-03-23 RX ORDER — LISINOPRIL 20 MG/1
TABLET ORAL
Qty: 180 TABLET | Refills: 0 | Status: SHIPPED | OUTPATIENT
Start: 2020-03-23 | End: 2020-07-02

## 2020-03-23 NOTE — TELEPHONE ENCOUNTER
Metoprolol      Last Written Prescription Date:  9/11/2019  Last Fill Quantity: 90 tab,   # refills: 1  Last Office Visit: 6/25/2019  Future Office visit:       Routing refill request to provider for review/approval because:      Atrovastatin      Last Written Prescription Date:  2/21/2019  Last Fill Quantity: 90tab,   # refills: 3  Last Office Visit: 6/25/2019  Future Office visit:       Routing refill request to provider for review/approval because:      Lisinopril      Last Written Prescription Date:  12/6/2019  Last Fill Quantity: 180 tab,   # refills: 0  Last Office Visit: 6/25/2019  Future Office visit:       Routing refill request to provider for review/approval because:

## 2020-06-30 DIAGNOSIS — I10 ESSENTIAL HYPERTENSION: ICD-10-CM

## 2020-06-30 DIAGNOSIS — R73.03 PRE-DIABETES: ICD-10-CM

## 2020-07-01 NOTE — TELEPHONE ENCOUNTER
Lisinopril       Last Written Prescription Date:  3/23/2020  Last Fill Quantity: 180,   # refills: 0  Last Office Visit: 6/25/2019  Future Office visit:

## 2020-07-02 RX ORDER — LISINOPRIL 20 MG/1
TABLET ORAL
Qty: 180 TABLET | Refills: 0 | Status: SHIPPED | OUTPATIENT
Start: 2020-07-02 | End: 2020-09-30

## 2020-07-02 NOTE — TELEPHONE ENCOUNTER
LISINOPRIL  20MG  TAB     ACE Inhibitors (Including Combos) Protocol Failed      Blood pressure under 140/90 in past 12 months    BP Readings from Last 3 Encounters:   11/04/19 145/92   06/25/19 124/75   10/04/18 140/84         Recent (12 mo) or future (30 days) visit within the authorizing provider's specialty      Normal serum creatinine on file in past 12 months    Creatinine   Date Value Ref Range Status   06/25/2019 0.81 0.66 - 1.25 mg/dL Final         Normal serum potassium on file in past 12 months    Potassium   Date Value Ref Range Status   06/25/2019 3.9 3.4 - 5.3 mmol/L Final

## 2020-07-02 NOTE — TELEPHONE ENCOUNTER
Call to patient notified refill has been sent to pharmacy per Dr. BETTY Crockett. Patient notified Dr. BETTY Crockett has sent an order to the last for BMP to be checked. Patient to schedule appt with the lab prior to coming in to the clinic.

## 2020-09-30 DIAGNOSIS — I10 ESSENTIAL HYPERTENSION: ICD-10-CM

## 2020-09-30 DIAGNOSIS — E78.2 MIXED HYPERLIPIDEMIA: ICD-10-CM

## 2020-09-30 RX ORDER — LISINOPRIL 20 MG/1
TABLET ORAL
Qty: 180 TABLET | Refills: 3 | Status: SHIPPED | OUTPATIENT
Start: 2020-09-30 | End: 2021-09-02

## 2020-09-30 RX ORDER — METOPROLOL SUCCINATE 100 MG/1
TABLET, EXTENDED RELEASE ORAL
Qty: 90 TABLET | Refills: 3 | Status: SHIPPED | OUTPATIENT
Start: 2020-09-30 | End: 2021-09-02

## 2020-09-30 RX ORDER — GEMFIBROZIL 600 MG/1
TABLET, FILM COATED ORAL
Qty: 180 TABLET | Refills: 3 | Status: SHIPPED | OUTPATIENT
Start: 2020-09-30 | End: 2021-10-01

## 2020-09-30 NOTE — TELEPHONE ENCOUNTER
Metoprolol       Last Written Prescription Date:  3/23/2020  Last Fill Quantity: 90,   # refills: 1  Last Office Visit: 6/25/2019  Future Office visit:

## 2020-09-30 NOTE — TELEPHONE ENCOUNTER
Gemfibrozil 600 mg      Last Written Prescription Date:  1-  Last Fill Quantity: 120,   # refills: 3  Last Office Visit: 6-25-19    Lisinopril 20 mg      Last Written Prescription Date:  7-2-2020  Last Fill Quantity: 180,   # refills: 0  Last Office Visit: 6-25-19

## 2020-10-06 ENCOUNTER — NURSE TRIAGE (OUTPATIENT)
Dept: FAMILY MEDICINE | Facility: OTHER | Age: 50
End: 2020-10-06

## 2020-10-06 ENCOUNTER — OFFICE VISIT (OUTPATIENT)
Dept: FAMILY MEDICINE | Facility: OTHER | Age: 50
End: 2020-10-06
Attending: FAMILY MEDICINE
Payer: COMMERCIAL

## 2020-10-06 DIAGNOSIS — R05.9 COUGH: Primary | ICD-10-CM

## 2020-10-06 PROCEDURE — U0003 INFECTIOUS AGENT DETECTION BY NUCLEIC ACID (DNA OR RNA); SEVERE ACUTE RESPIRATORY SYNDROME CORONAVIRUS 2 (SARS-COV-2) (CORONAVIRUS DISEASE [COVID-19]), AMPLIFIED PROBE TECHNIQUE, MAKING USE OF HIGH THROUGHPUT TECHNOLOGIES AS DESCRIBED BY CMS-2020-01-R: HCPCS | Performed by: FAMILY MEDICINE

## 2020-10-06 NOTE — TELEPHONE ENCOUNTER
Pt called, requesting covid testing. Reports cough and fever that started yesterday. Denies SOB, sore throat. Scheduled for curbside testing. Advised to quarantine per recommendations from [facility]/CDC, symptomatic treatment, call back with change or worsening in symptoms. Pt verbalizes understanding.       Reason for Disposition    [1] COVID-19 infection suspected by caller or triager AND [2] mild symptoms (cough, fever, or others) AND [3] no complications or SOB    Additional Information    Negative: SEVERE difficulty breathing (e.g., struggling for each breath, speaks in single words)    Negative: Difficult to awaken or acting confused (e.g., disoriented, slurred speech)    Negative: Bluish (or gray) lips or face now    Negative: Shock suspected (e.g., cold/pale/clammy skin, too weak to stand, low BP, rapid pulse)    Negative: Sounds like a life-threatening emergency to the triager    Negative: [1] COVID-19 exposure AND [2] no symptoms    Negative: COVID-19 and Breastfeeding, questions about    Negative: [1] Adult with possible COVID-19 symptoms AND [2] triager concerned about severity of symptoms or other causes    Negative: SEVERE or constant chest pain or pressure (Exception: mild central chest pain, present only when coughing)    Negative: MODERATE difficulty breathing (e.g., speaks in phrases, SOB even at rest, pulse 100-120)    Negative: Patient sounds very sick or weak to the triager    Negative: MILD difficulty breathing (e.g., minimal/no SOB at rest, SOB with walking, pulse <100)    Negative: Chest pain or pressure    Negative: Fever > 103 F (39.4 C)    Negative: [1] Fever > 101 F (38.3 C) AND [2] age > 60    Negative: [1] Fever > 100.0 F (37.8 C) AND [2] bedridden (e.g., nursing home patient, CVA, chronic illness, recovering from surgery)    Negative: HIGH RISK patient (e.g., age > 64 years, diabetes, heart or lung disease, weak immune system) (Exception: Has already been evaluated by healthcare provider and  "has no new or worsening symptoms)    Negative: Fever present > 3 days (72 hours)    Negative: [1] Fever returns after gone for over 24 hours AND [2] symptoms worse or not improved    Negative: [1] Continuous (nonstop) coughing interferes with work or school AND [2] no improvement using cough treatment per protocol    Answer Assessment - Initial Assessment Questions  1. COVID-19 DIAGNOSIS: \"Who made your Coronavirus (COVID-19) diagnosis?\" \"Was it confirmed by a positive lab test?\" If not diagnosed by a HCP, ask \"Are there lots of cases (community spread) where you live?\" (See public health department website, if unsure)      Not confirmed  2. ONSET: \"When did the COVID-19 symptoms start?\"       yesterday  3. WORST SYMPTOM: \"What is your worst symptom?\" (e.g., cough, fever, shortness of breath, muscle aches)      fever  4. COUGH: \"Do you have a cough?\" If so, ask: \"How bad is the cough?\"        Yes, dry  5. FEVER: \"Do you have a fever?\" If so, ask: \"What is your temperature, how was it measured, and when did it start?\"      Yes, 100.8 oral  6. RESPIRATORY STATUS: \"Describe your breathing?\" (e.g., shortness of breath, wheezing, unable to speak)       no  7. BETTER-SAME-WORSE: \"Are you getting better, staying the same or getting worse compared to yesterday?\"  If getting worse, ask, \"In what way?\"      worse  8. HIGH RISK DISEASE: \"Do you have any chronic medical problems?\" (e.g., asthma, heart or lung disease, weak immune system, etc.)      HTN  9. PREGNANCY: \"Is there any chance you are pregnant?\" \"When was your last menstrual period?\"      no  10. OTHER SYMPTOMS: \"Do you have any other symptoms?\"  (e.g., chills, fatigue, headache, loss of smell or taste, muscle pain, sore throat)        headache    Protocols used: CORONAVIRUS (COVID-19) DIAGNOSED OR [id number] 8.4.20      "

## 2020-10-07 LAB
SARS-COV-2 RNA SPEC QL NAA+PROBE: ABNORMAL
SPECIMEN SOURCE: ABNORMAL

## 2020-12-20 ENCOUNTER — HEALTH MAINTENANCE LETTER (OUTPATIENT)
Age: 50
End: 2020-12-20

## 2021-01-15 ENCOUNTER — TRANSFERRED RECORDS (OUTPATIENT)
Dept: HEALTH INFORMATION MANAGEMENT | Facility: CLINIC | Age: 51
End: 2021-01-15

## 2021-01-18 ENCOUNTER — TELEPHONE (OUTPATIENT)
Dept: FAMILY MEDICINE | Facility: OTHER | Age: 51
End: 2021-01-18

## 2021-01-18 NOTE — TELEPHONE ENCOUNTER
Spoke with patient this is his 3 positive test for the past 3 months he will finish quarantine then test again to see if he still is testing positive  Pamela Martell LPN

## 2021-03-19 DIAGNOSIS — E78.00 PURE HYPERCHOLESTEROLEMIA: ICD-10-CM

## 2021-03-22 RX ORDER — ATORVASTATIN CALCIUM 10 MG/1
TABLET, FILM COATED ORAL
Qty: 90 TABLET | Refills: 3 | Status: SHIPPED | OUTPATIENT
Start: 2021-03-22 | End: 2022-02-15

## 2021-03-22 NOTE — TELEPHONE ENCOUNTER
Lipitor  Last Written Prescription Date: 3/23/20  Last Fill Quantity: 90 # of Refills: 3  Last Office Visit: 6/25/19

## 2021-06-01 ENCOUNTER — MEDICAL CORRESPONDENCE (OUTPATIENT)
Dept: HEALTH INFORMATION MANAGEMENT | Facility: CLINIC | Age: 51
End: 2021-06-01

## 2021-09-01 DIAGNOSIS — I10 ESSENTIAL HYPERTENSION: ICD-10-CM

## 2021-09-02 RX ORDER — GEMFIBROZIL 600 MG/1
TABLET, FILM COATED ORAL
Qty: 180 TABLET | Refills: 3 | Status: SHIPPED | OUTPATIENT
Start: 2021-09-02 | End: 2022-12-16

## 2021-09-02 RX ORDER — LISINOPRIL 20 MG/1
TABLET ORAL
Qty: 180 TABLET | Refills: 3 | Status: SHIPPED | OUTPATIENT
Start: 2021-09-02 | End: 2022-09-14

## 2021-09-02 RX ORDER — METOPROLOL SUCCINATE 100 MG/1
TABLET, EXTENDED RELEASE ORAL
Qty: 90 TABLET | Refills: 3 | Status: SHIPPED | OUTPATIENT
Start: 2021-09-02 | End: 2022-10-12

## 2021-09-02 NOTE — TELEPHONE ENCOUNTER
metoprolol      Last Written Prescription Date:  9/30/20  Last Fill Quantity: 90,   # refills: 3  Last Office Visit: 6/25/19  Future Office visit:       Lisinopril 9/30/20 #180 with 3  Gemfibrozil 9/30/20 #180 with 3

## 2021-09-20 ENCOUNTER — TRANSFERRED RECORDS (OUTPATIENT)
Dept: HEALTH INFORMATION MANAGEMENT | Facility: CLINIC | Age: 51
End: 2021-09-20

## 2021-10-01 ENCOUNTER — OFFICE VISIT (OUTPATIENT)
Dept: FAMILY MEDICINE | Facility: OTHER | Age: 51
End: 2021-10-01
Attending: FAMILY MEDICINE
Payer: COMMERCIAL

## 2021-10-01 VITALS
OXYGEN SATURATION: 100 % | HEIGHT: 66 IN | HEART RATE: 64 BPM | BODY MASS INDEX: 38.25 KG/M2 | TEMPERATURE: 97.9 F | SYSTOLIC BLOOD PRESSURE: 142 MMHG | RESPIRATION RATE: 20 BRPM | DIASTOLIC BLOOD PRESSURE: 82 MMHG | WEIGHT: 238 LBS

## 2021-10-01 DIAGNOSIS — R73.03 PRE-DIABETES: ICD-10-CM

## 2021-10-01 DIAGNOSIS — I10 ESSENTIAL HYPERTENSION: ICD-10-CM

## 2021-10-01 DIAGNOSIS — E78.00 PURE HYPERCHOLESTEROLEMIA: ICD-10-CM

## 2021-10-01 DIAGNOSIS — Z23 NEED FOR PROPHYLACTIC VACCINATION AND INOCULATION AGAINST INFLUENZA: Primary | ICD-10-CM

## 2021-10-01 DIAGNOSIS — G45.9 TIA (TRANSIENT ISCHEMIC ATTACK): ICD-10-CM

## 2021-10-01 DIAGNOSIS — Z00.00 VISIT FOR PREVENTIVE HEALTH EXAMINATION: ICD-10-CM

## 2021-10-01 PROCEDURE — 99396 PREV VISIT EST AGE 40-64: CPT | Mod: 25 | Performed by: FAMILY MEDICINE

## 2021-10-01 PROCEDURE — 90686 IIV4 VACC NO PRSV 0.5 ML IM: CPT | Performed by: FAMILY MEDICINE

## 2021-10-01 PROCEDURE — 90471 IMMUNIZATION ADMIN: CPT | Performed by: FAMILY MEDICINE

## 2021-10-01 RX ORDER — AMLODIPINE BESYLATE 5 MG/1
5 TABLET ORAL DAILY
Qty: 30 TABLET | Refills: 1 | Status: SHIPPED | OUTPATIENT
Start: 2021-10-01 | End: 2021-12-02

## 2021-10-01 RX ORDER — AMOXICILLIN 500 MG
1200 CAPSULE ORAL DAILY
COMMUNITY

## 2021-10-01 RX ORDER — ASPIRIN 81 MG/1
81 TABLET ORAL DAILY
COMMUNITY

## 2021-10-01 ASSESSMENT — MIFFLIN-ST. JEOR: SCORE: 1887.31

## 2021-10-01 ASSESSMENT — PAIN SCALES - GENERAL: PAINLEVEL: NO PAIN (0)

## 2021-10-01 NOTE — PROGRESS NOTES
"    Assessment & Plan           (Z00.00) Visit for preventive health examination  (primary encounter diagnosis)  Comment:   Plan: Here for preventative exam.    (G45.9) TIA (transient ischemic attack)  Comment:   Plan: MR Brain w/o & w Contrast, US Carotid         Bilateral, ESR: Erythrocyte sedimentation rate        We will assume these 32nd episodes of arm numbness is a TIA.  We will get brain MRI MRA carotid Doppler and sed rate.  He also has hypertension working increase his treatment to add a third medication he will keep with his lisinopril and metoprolol but add amlodipine.  He has prediabetes we will check a CMP and an A1c plus check lipids.  Discussed proper diet for weight loss.  We will see him in 2 weeks to recheck blood pressure.  And will also get an MRI MRA of the brain and carotid Doppler and he will come in for future labs.  Flu shot was given today.    (I10) Essential hypertension  Comment:   Plan: amLODIPine (NORVASC) 5 MG tablet, CBC with         platelets and differential            (R73.03) Pre-diabetes  Comment:   Plan: Comprehensive metabolic panel (BMP + Alb, Alk         Phos, ALT, AST, Total. Bili, TP), Hemoglobin         A1c            (Z23) Need for prophylactic vaccination and inoculation against influenza  Comment:             (E78.00) Pure hypercholesterolemia  Comment:   Plan: Lipid panel reflex to direct LDL Fasting            (Z68.38) BMI 38.0-38.9,adult  Comment:   Plan:                  BMI:   Estimated body mass index is 38.41 kg/m  as calculated from the following:    Height as of this encounter: 1.676 m (5' 6\").    Weight as of this encounter: 108 kg (238 lb).   Weight management plan: Discussed healthy diet and exercise guidelines            KRISTINE Crockett MD  [facility] is a 51 year old who presents for the following health issues   Patient states over the last 2 years he has had 20-30 episodes mainly when he is in the shower where " he will not get lightheaded but have some numbness and maybe some clumsiness in the hand or an arm it lasts up to 30 seconds and clears.  He denies any chest pain or shortness of breath.  Does not smoke or drink.  He did have Covid about a year ago and no chronic problems from that.  Healthy Habits:     Getting at least 3 servings of Calcium per day:  Yes    Bi-annual eye exam:  Yes    Dental care twice a year:  NO    Sleep apnea or symptoms of sleep apnea:  Sleep apnea    Diet:  Regular (no restrictions)    Frequency of exercise:  1 day/week    Duration of exercise:  15-30 minutes    Taking medications regularly:  No    Medication side effects:  None    PHQ-2 Total Score: 0    Additional concerns today:  Yes       Hyperlipidemia Follow-Up      Are you regularly taking any medication or supplement to lower your cholesterol?   Yes- Lopid    Are you having muscle aches or other side effects that you think could be caused by your cholesterol lowering medication?  No    Hypertension Follow-up      Do you check your blood pressure regularly outside of the clinic? No     Are you following a low salt diet? Yes    Are your blood pressures ever more than 140 on the top number (systolic) OR more   than 90 on the bottom number (diastolic), for example 140/90? No, not currently checking      How many servings of fruits and vegetables do you eat daily?  0-1    On average, how many sweetened beverages do you drink each day (Examples: soda, juice, sweet tea, etc.  Do NOT count diet or artificially sweetened beverages)?   0    How many days per week do you exercise enough to make your heart beat faster? 3 or less    How many minutes a day do you exercise enough to make your heart beat faster? 9 or less    How many days per week do you miss taking your medication? 0        Review of Systems   Constitutional, HEENT, cardiovascular, pulmonary, GI, , musculoskeletal, neuro, skin, endocrine and psych systems are negative, except as  "otherwise noted.      Objective    BP (!) 142/82   Pulse 64   Temp 97.9  F (36.6  C) (Tympanic)   Resp 20   Ht 1.676 m (5' 6\")   Wt 108 kg (238 lb)   SpO2 100%   BMI 38.41 kg/m    Body mass index is 38.41 kg/m .  Physical Exam   GENERAL: healthy, alert and no distress  EYES: Eyes grossly normal to inspection, PERRL and conjunctivae and sclerae normal  HENT: ear canals and TM's normal, nose and mouth without ulcers or lesions  NECK: no adenopathy, no asymmetry, masses, or scars and thyroid normal to palpation  RESP: lungs clear to auscultation - no rales, rhonchi or wheezes  CV: regular rate and rhythm, normal S1 S2, no S3 or S4, no murmur, click or rub, no peripheral edema and peripheral pulses strong  ABDOMEN: soft, nontender, no hepatosplenomegaly, no masses and bowel sounds normal   (male): normal male genitalia without lesions or urethral discharge, no hernia  RECTAL: normal sphincter tone, no rectal masses, prostate normal size, smooth, nontender without nodules or masses  MS: no gross musculoskeletal defects noted, no edema  SKIN: no suspicious lesions or rashes  NEURO: Normal strength and tone, mentation intact and speech normal  PSYCH: mentation appears normal, affect normal/bright  LYMPH: no cervical, supraclavicular, axillary, or inguinal adenopathy  Diabetic foot exam: normal DP and PT pulses, no trophic changes or ulcerative lesions and normal sensory exam                "

## 2021-10-02 ENCOUNTER — TELEPHONE (OUTPATIENT)
Dept: FAMILY MEDICINE | Facility: OTHER | Age: 51
End: 2021-10-02

## 2021-10-05 ENCOUNTER — LAB (OUTPATIENT)
Dept: LAB | Facility: OTHER | Age: 51
End: 2021-10-05
Payer: COMMERCIAL

## 2021-10-05 ENCOUNTER — TELEPHONE (OUTPATIENT)
Dept: FAMILY MEDICINE | Facility: OTHER | Age: 51
End: 2021-10-05

## 2021-10-05 DIAGNOSIS — R73.03 PRE-DIABETES: ICD-10-CM

## 2021-10-05 DIAGNOSIS — G45.9 TIA (TRANSIENT ISCHEMIC ATTACK): ICD-10-CM

## 2021-10-05 DIAGNOSIS — E78.00 PURE HYPERCHOLESTEROLEMIA: ICD-10-CM

## 2021-10-05 DIAGNOSIS — I10 ESSENTIAL HYPERTENSION: ICD-10-CM

## 2021-10-05 LAB
ALBUMIN SERPL-MCNC: 3.9 G/DL (ref 3.4–5)
ALP SERPL-CCNC: 71 U/L (ref 40–150)
ALT SERPL W P-5'-P-CCNC: 27 U/L (ref 0–70)
ANION GAP SERPL CALCULATED.3IONS-SCNC: 4 MMOL/L (ref 3–14)
AST SERPL W P-5'-P-CCNC: 16 U/L (ref 0–45)
BASOPHILS # BLD AUTO: 0 10E3/UL (ref 0–0.2)
BASOPHILS NFR BLD AUTO: 1 %
BILIRUB SERPL-MCNC: 0.5 MG/DL (ref 0.2–1.3)
BUN SERPL-MCNC: 15 MG/DL (ref 7–30)
CALCIUM SERPL-MCNC: 9.2 MG/DL (ref 8.5–10.1)
CHLORIDE BLD-SCNC: 109 MMOL/L (ref 94–109)
CHOLEST SERPL-MCNC: 180 MG/DL
CO2 SERPL-SCNC: 27 MMOL/L (ref 20–32)
CREAT SERPL-MCNC: 0.88 MG/DL (ref 0.66–1.25)
EOSINOPHIL # BLD AUTO: 0.2 10E3/UL (ref 0–0.7)
EOSINOPHIL NFR BLD AUTO: 3 %
ERYTHROCYTE [DISTWIDTH] IN BLOOD BY AUTOMATED COUNT: 15.3 % (ref 10–15)
ERYTHROCYTE [SEDIMENTATION RATE] IN BLOOD BY WESTERGREN METHOD: 13 MM/HR (ref 0–15)
EST. AVERAGE GLUCOSE BLD GHB EST-MCNC: 128 MG/DL
FASTING STATUS PATIENT QL REPORTED: YES
GFR SERPL CREATININE-BSD FRML MDRD: >90 ML/MIN/1.73M2
GLUCOSE BLD-MCNC: 104 MG/DL (ref 70–99)
HBA1C MFR BLD: 6.1 % (ref 0–5.6)
HCT VFR BLD AUTO: 36.6 % (ref 40–53)
HDLC SERPL-MCNC: 37 MG/DL
HGB BLD-MCNC: 11.4 G/DL (ref 13.3–17.7)
IMM GRANULOCYTES # BLD: 0 10E3/UL
IMM GRANULOCYTES NFR BLD: 0 %
LDLC SERPL CALC-MCNC: 114 MG/DL
LYMPHOCYTES # BLD AUTO: 2 10E3/UL (ref 0.8–5.3)
LYMPHOCYTES NFR BLD AUTO: 31 %
MCH RBC QN AUTO: 22.3 PG (ref 26.5–33)
MCHC RBC AUTO-ENTMCNC: 31.1 G/DL (ref 31.5–36.5)
MCV RBC AUTO: 72 FL (ref 78–100)
MONOCYTES # BLD AUTO: 0.6 10E3/UL (ref 0–1.3)
MONOCYTES NFR BLD AUTO: 10 %
NEUTROPHILS # BLD AUTO: 3.5 10E3/UL (ref 1.6–8.3)
NEUTROPHILS NFR BLD AUTO: 55 %
NONHDLC SERPL-MCNC: 143 MG/DL
NRBC # BLD AUTO: 0 10E3/UL
NRBC BLD AUTO-RTO: 0 /100
PLATELET # BLD AUTO: 300 10E3/UL (ref 150–450)
POTASSIUM BLD-SCNC: 4.1 MMOL/L (ref 3.4–5.3)
PROT SERPL-MCNC: 8.1 G/DL (ref 6.8–8.8)
RBC # BLD AUTO: 5.11 10E6/UL (ref 4.4–5.9)
SODIUM SERPL-SCNC: 140 MMOL/L (ref 133–144)
TRIGL SERPL-MCNC: 144 MG/DL
WBC # BLD AUTO: 6.3 10E3/UL (ref 4–11)

## 2021-10-05 PROCEDURE — 85652 RBC SED RATE AUTOMATED: CPT

## 2021-10-05 PROCEDURE — 80053 COMPREHEN METABOLIC PANEL: CPT

## 2021-10-05 PROCEDURE — 85025 COMPLETE CBC W/AUTO DIFF WBC: CPT

## 2021-10-05 PROCEDURE — 80061 LIPID PANEL: CPT

## 2021-10-05 PROCEDURE — 83036 HEMOGLOBIN GLYCOSYLATED A1C: CPT

## 2021-10-05 PROCEDURE — 36415 COLL VENOUS BLD VENIPUNCTURE: CPT

## 2021-10-05 NOTE — TELEPHONE ENCOUNTER
Pt called back. Wondering if he can wait until 10/22 to do EKG as he has other testing scheduled for this day as well. Please advise. Thank you!

## 2021-10-20 ENCOUNTER — TELEPHONE (OUTPATIENT)
Dept: FAMILY MEDICINE | Facility: OTHER | Age: 51
End: 2021-10-20

## 2021-10-20 NOTE — TELEPHONE ENCOUNTER
10:26 AM    Reason for Call: Phone Call    Description: FYI: Patient calling stating that his doctors office told him he needed a colonoscopy there is no referral in Epic and patient would like to speak to the nurse regarding this.     Was an appointment offered for this call? No  If yes : Appointment type              Date    Preferred method for responding to this message: Telephone Call  What is your phone number ? [phone]    If we cannot reach you directly, may we leave a detailed response at the number you provided? Yes    Can this message wait until your PCP/provider returns, if available today? YES, No

## 2021-10-21 ENCOUNTER — TELEPHONE (OUTPATIENT)
Dept: FAMILY MEDICINE | Facility: OTHER | Age: 51
End: 2021-10-21

## 2021-10-21 DIAGNOSIS — D64.9 ANEMIA, UNSPECIFIED TYPE: Primary | ICD-10-CM

## 2021-10-21 NOTE — TELEPHONE ENCOUNTER
Patient returned call, requesting to discuss with SHAINA Santiago. Call transferred to Pamela's Voicemail per patient request.

## 2021-10-21 NOTE — TELEPHONE ENCOUNTER
LVM for return call, I do not see in his chart where he is due for colonoscopy  Pamela Martell LPN

## 2021-10-22 ENCOUNTER — HOSPITAL ENCOUNTER (OUTPATIENT)
Dept: ULTRASOUND IMAGING | Facility: HOSPITAL | Age: 51
End: 2021-10-22
Attending: FAMILY MEDICINE
Payer: COMMERCIAL

## 2021-10-22 ENCOUNTER — HOSPITAL ENCOUNTER (OUTPATIENT)
Dept: MRI IMAGING | Facility: HOSPITAL | Age: 51
End: 2021-10-22
Attending: FAMILY MEDICINE
Payer: COMMERCIAL

## 2021-10-22 ENCOUNTER — OFFICE VISIT (OUTPATIENT)
Dept: FAMILY MEDICINE | Facility: OTHER | Age: 51
End: 2021-10-22
Attending: FAMILY MEDICINE
Payer: COMMERCIAL

## 2021-10-22 VITALS
OXYGEN SATURATION: 98 % | BODY MASS INDEX: 38.25 KG/M2 | SYSTOLIC BLOOD PRESSURE: 130 MMHG | HEIGHT: 66 IN | HEART RATE: 66 BPM | RESPIRATION RATE: 16 BRPM | DIASTOLIC BLOOD PRESSURE: 66 MMHG | WEIGHT: 238 LBS | TEMPERATURE: 97.8 F

## 2021-10-22 DIAGNOSIS — R68.89 SPELLS OF DECREASED ATTENTIVENESS: ICD-10-CM

## 2021-10-22 DIAGNOSIS — G45.9 TIA (TRANSIENT ISCHEMIC ATTACK): Primary | ICD-10-CM

## 2021-10-22 DIAGNOSIS — Z12.11 COLON CANCER SCREENING: ICD-10-CM

## 2021-10-22 DIAGNOSIS — G45.9 TIA (TRANSIENT ISCHEMIC ATTACK): ICD-10-CM

## 2021-10-22 PROCEDURE — 93000 ELECTROCARDIOGRAM COMPLETE: CPT | Mod: 77 | Performed by: INTERNAL MEDICINE

## 2021-10-22 PROCEDURE — A9585 GADOBUTROL INJECTION: HCPCS | Performed by: RADIOLOGY

## 2021-10-22 PROCEDURE — 93880 EXTRACRANIAL BILAT STUDY: CPT

## 2021-10-22 PROCEDURE — 70553 MRI BRAIN STEM W/O & W/DYE: CPT

## 2021-10-22 PROCEDURE — 255N000002 HC RX 255 OP 636: Performed by: RADIOLOGY

## 2021-10-22 PROCEDURE — 99214 OFFICE O/P EST MOD 30 MIN: CPT | Performed by: FAMILY MEDICINE

## 2021-10-22 RX ORDER — GADOBUTROL 604.72 MG/ML
10 INJECTION INTRAVENOUS ONCE
Status: COMPLETED | OUTPATIENT
Start: 2021-10-22 | End: 2021-10-22

## 2021-10-22 RX ADMIN — GADOBUTROL 10 ML: 604.72 INJECTION INTRAVENOUS at 11:28

## 2021-10-22 ASSESSMENT — ANXIETY QUESTIONNAIRES
GAD7 TOTAL SCORE: 0
3. WORRYING TOO MUCH ABOUT DIFFERENT THINGS: NOT AT ALL
6. BECOMING EASILY ANNOYED OR IRRITABLE: NOT AT ALL
7. FEELING AFRAID AS IF SOMETHING AWFUL MIGHT HAPPEN: NOT AT ALL
5. BEING SO RESTLESS THAT IT IS HARD TO SIT STILL: NOT AT ALL
2. NOT BEING ABLE TO STOP OR CONTROL WORRYING: NOT AT ALL
4. TROUBLE RELAXING: NOT AT ALL
1. FEELING NERVOUS, ANXIOUS, OR ON EDGE: NOT AT ALL

## 2021-10-22 ASSESSMENT — PAIN SCALES - GENERAL: PAINLEVEL: NO PAIN (0)

## 2021-10-22 ASSESSMENT — MIFFLIN-ST. JEOR: SCORE: 1887.31

## 2021-10-22 ASSESSMENT — PATIENT HEALTH QUESTIONNAIRE - PHQ9: SUM OF ALL RESPONSES TO PHQ QUESTIONS 1-9: 0

## 2021-10-22 NOTE — NURSING NOTE
"Chief Complaint   Patient presents with     Results       Initial /66 (BP Location: Left arm, Patient Position: Sitting, Cuff Size: Adult Large)   Pulse 66   Temp 97.8  F (36.6  C) (Tympanic)   Resp 16   Ht 1.676 m (5' 6\")   Wt 108 kg (238 lb)   SpO2 98%   BMI 38.41 kg/m   Estimated body mass index is 38.41 kg/m  as calculated from the following:    Height as of this encounter: 1.676 m (5' 6\").    Weight as of this encounter: 108 kg (238 lb).  Medication Reconciliation: complete  Pamela Martell LPN  "

## 2021-10-22 NOTE — PROGRESS NOTES
Assessment & Plan     TIA (transient ischemic attack)    - EKG 12-lead complete w/read - (Clinic Performed)  - Leadless EKG Monitor 3 to 7 Days; Future  - Echocardiogram Complete; Future  - Adult Neurology Referral    Spells of decreased attentiveness    - Adult Neurology Referral  Referral placed for neurology with possible EEG.  Patient has had 2 years of these spells they come on last 10 to 30 seconds he gets kind of cloudy in the head and his hand will get clumsy sometimes he will drop things mainly its the right side but sometimes the left side.  He did just have a carotid Doppler that showed under 50% occlusion and had a brain MRI with and without contrast that was totally normal.  Had an EKG that shows sinus bradycardia but he is on a beta-blocker.  We will get a cardiac echo to make sure there is not a septal defect using a bubble study and will get a Zio patch.  Again we will have him see neurology to see if they think this is more seizure activity or TIA.  He is on lipid-lowering agents will keep with that.  He is also prediabetic and has an elevated BMI we discussed the importance for him to work with diet and exercise.  Spent over 20 minutes discussing test results including the MRI of the carotid Doppler and the EKG that were done all recently and the importance of getting these other tests and this neurology consultation.  Colon cancer screening    - COLOGUARD([employer])  Patient had a recent microcytic anemia because we are working up this possible TIA and he is on aspirin will do a Cologuard.  He may end up needing to do a colonoscopy.                   KRISTINE Crockett MD  [facility] is a 51 year old who presents for the following health issues     HPI           Review of Systems   Constitutional, HEENT, cardiovascular, pulmonary, GI, , musculoskeletal, neuro, skin, endocrine and psych systems are negative, except as otherwise noted.      Objective  "   /66 (BP Location: Left arm, Patient Position: Sitting, Cuff Size: Adult Large)   Pulse 66   Temp 97.8  F (36.6  C) (Tympanic)   Resp 16   Ht 1.676 m (5' 6\")   Wt 108 kg (238 lb)   SpO2 98%   BMI 38.41 kg/m    Body mass index is 38.41 kg/m .  Physical Exam   GENERAL: healthy, alert and no distress  EYES: Eyes grossly normal to inspection, PERRL and conjunctivae and sclerae normal  NECK: no adenopathy, no asymmetry, masses, or scars and thyroid normal to palpation  RESP: lungs clear to auscultation - no rales, rhonchi or wheezes  CV: regular rate and rhythm, normal S1 S2, no S3 or S4, no murmur, click or rub, no peripheral edema and peripheral pulses strong  ABDOMEN: soft, nontender, no hepatosplenomegaly, no masses and bowel sounds normal  MS: no gross musculoskeletal defects noted, no edema  SKIN: no suspicious lesions or rashes  NEURO: Normal strength and tone, mentation intact and speech normal  PSYCH: mentation appears normal, affect normal/bright                "

## 2021-10-22 NOTE — LETTER
November 30, 2021      Jos Ej M Donell  [address] [place] [address]        Dear ,    We are writing to inform you of your test results.    Neg cologuard test       Resulted Orders   COLOGUARD([employer])   Result Value Ref Range    COLOGUARD-ABSTRACT Negative        If you have any questions or concerns, please call the clinic at the number listed above.       Sincerely,      KRISTINE Crockett MD

## 2021-10-23 ASSESSMENT — ANXIETY QUESTIONNAIRES: GAD7 TOTAL SCORE: 0

## 2021-10-25 ENCOUNTER — TELEPHONE (OUTPATIENT)
Dept: FAMILY MEDICINE | Facility: OTHER | Age: 51
End: 2021-10-25

## 2021-10-25 NOTE — TELEPHONE ENCOUNTER
Called patient to schedule consult appointment with general surgery for anemia & colonoscopy. Patient stated wanted to do the cologuard first. Please call patient back at [phone] with any questions

## 2021-11-15 ENCOUNTER — TRANSFERRED RECORDS (OUTPATIENT)
Dept: HEALTH INFORMATION MANAGEMENT | Facility: CLINIC | Age: 51
End: 2021-11-15

## 2021-11-16 LAB — COLOGUARD-ABSTRACT: NEGATIVE

## 2021-11-19 ENCOUNTER — HOSPITAL ENCOUNTER (OUTPATIENT)
Dept: CARDIOLOGY | Facility: HOSPITAL | Age: 51
Discharge: HOME | End: 2021-11-19
Attending: FAMILY MEDICINE | Admitting: INTERNAL MEDICINE
Payer: COMMERCIAL

## 2021-11-19 DIAGNOSIS — G45.9 TIA (TRANSIENT ISCHEMIC ATTACK): ICD-10-CM

## 2021-11-19 LAB — LVEF ECHO: NORMAL

## 2021-11-19 PROCEDURE — 999N000208 ECHOCARDIOGRAM COMPLETE

## 2021-11-19 PROCEDURE — 93306 TTE W/DOPPLER COMPLETE: CPT | Mod: 26 | Performed by: INTERNAL MEDICINE

## 2021-11-23 ENCOUNTER — TELEPHONE (OUTPATIENT)
Dept: FAMILY MEDICINE | Facility: OTHER | Age: 51
End: 2021-11-23
Payer: COMMERCIAL

## 2021-11-23 NOTE — TELEPHONE ENCOUNTER
HUC spoke with neurology they are a month behind but patient is on list, might be 2-3 weeks yet before they call to schedule him. Patient has been notified.  Pamela Martell LPN

## 2021-12-02 DIAGNOSIS — I10 ESSENTIAL HYPERTENSION: ICD-10-CM

## 2021-12-02 RX ORDER — AMLODIPINE BESYLATE 5 MG/1
5 TABLET ORAL DAILY
Qty: 90 TABLET | Refills: 3 | Status: SHIPPED | OUTPATIENT
Start: 2021-12-02 | End: 2022-12-14

## 2022-01-05 ENCOUNTER — TRANSFERRED RECORDS (OUTPATIENT)
Dept: HEALTH INFORMATION MANAGEMENT | Facility: CLINIC | Age: 52
End: 2022-01-05

## 2022-02-13 DIAGNOSIS — E78.00 PURE HYPERCHOLESTEROLEMIA: ICD-10-CM

## 2022-02-15 RX ORDER — ATORVASTATIN CALCIUM 10 MG/1
TABLET, FILM COATED ORAL
Qty: 90 TABLET | Refills: 1 | Status: SHIPPED | OUTPATIENT
Start: 2022-02-15 | End: 2022-09-13

## 2022-02-15 NOTE — TELEPHONE ENCOUNTER
Lipitor - est care with you 6.10.22      All labs up to date.    Last Written Prescription Date:  12.22.21  Last Fill Quantity: #90,   # refills: 0  Last Office Visit: 10.22.21  Future Office visit:       Routing refill request to provider for review/approval because:

## 2022-06-09 NOTE — PROGRESS NOTES
"  Assessment & Plan     Encounter for medical examination to establish care  Records here    ZEB on CPAP  Uses it faithfully    Mixed hyperlipidemia  The 10-year ASCVD risk score ([place] YANG Jr., et al., 2013) is: 4.4%    Values used to calculate the score:      Age: 52 years      Sex: Male      Is Non- : No      Diabetic: No      Tobacco smoker: No      Systolic Blood Pressure: 122 mmHg      Is BP treated: Yes      HDL Cholesterol: 37 mg/dL      Total Cholesterol: 180 mg/dL  Due in the fall, does he need cholesterol med based on above, not discussed with patient today, will discuss in the fall    Essential hypertension  Stable, doing well    Palpitations  Needs new monitor, was done 'many' years ago and nothing came of it but recommend repeat as it happens at least weekly  Mg? PVCs?  A fib?  - Leadless EKG Monitor 3 to 7 Days; Future  - TSH with free T4 reflex; Future  - TSH with free T4 reflex    Encounter for screening for other viral diseases  due  - Hepatitis C Screen Reflex to HCV RNA Quant and Genotype; Future  - Hepatitis C Screen Reflex to HCV RNA Quant and Genotype    Paresthesia  b12 normal, will get emg  - Vitamin B12; Future  - Vitamin B12    Hypovitaminosis D    - Vitamin D Deficiency; Future  - Vitamin D Deficiency    Right arm numbness  b12 normal but has had this coming and going, may need MRI cervical spine  - Adult Neurology  Referral    Provider  Link to [facility] Help Grid :[id number]}     BMI:   Estimated body mass index is 39.44 kg/m  as calculated from the following:    Height as of this encounter: 1.651 m (5' 5\").    Weight as of this encounter: 107.5 kg (237 lb).   Weight management plan: Discussed healthy diet and exercise guidelines    Patient was agreeable to this plan and had no further questions.  There are no Patient Instructions on file for this visit.    No follow-ups on file.    Jennifer Herbert MD  [facility] is a 52 " "year old who presents for the following health issues   HPI     LOV - 10/22/2021 - Dr. BETTY Crockett    Hyperlipidemia Follow-Up      Are you regularly taking any medication or supplement to lower your cholesterol?   Yes- Lipitor 10 mg    Are you having muscle aches or other side effects that you think could be caused by your cholesterol lowering medication?  Yes- occassionaly, not sure if it is from Lipitor    Hypertension Follow-up      Do you check your blood pressure regularly outside of the clinic? No     Are you following a low salt diet? No    Are your blood pressures ever more than 140 on the top number (systolic) OR more   than 90 on the bottom number (diastolic), for example 140/90? No    Review of Systems   Constitutional, HEENT, cardiovascular, pulmonary, gi and gu systems are negative, except as otherwise noted.      Objective    /78   Pulse 69   Temp 97.2  F (36.2  C) (Tympanic)   Resp 16   Ht 1.651 m (5' 5\")   Wt 107.5 kg (237 lb)   SpO2 100%   BMI 39.44 kg/m    Body mass index is 39.44 kg/m .  Physical Exam   GENERAL: healthy, alert and no distress  NECK: no adenopathy, no asymmetry, masses, or scars and thyroid normal to palpation  RESP: lungs clear to auscultation - no rales, rhonchi or wheezes  CV: regular rate and rhythm, normal S1 S2, no S3 or S4, no murmur, click or rub, no peripheral edema and peripheral pulses strong  ABDOMEN: soft, nontender, no hepatosplenomegaly, no masses and bowel sounds normal  MS: no gross musculoskeletal defects noted, no edema  PSYCH: mentation appears normal, affect normal/bright    Results for orders placed or performed in visit on 06/10/22   TSH with free T4 reflex     Status: Normal   Result Value Ref Range    TSH 1.66 0.40 - 4.00 mU/L   Vitamin B12     Status: Normal   Result Value Ref Range    Vitamin B12 600 193 - 986 pg/mL             "

## 2022-06-10 ENCOUNTER — OFFICE VISIT (OUTPATIENT)
Dept: FAMILY MEDICINE | Facility: OTHER | Age: 52
End: 2022-06-10
Attending: FAMILY MEDICINE
Payer: COMMERCIAL

## 2022-06-10 VITALS
DIASTOLIC BLOOD PRESSURE: 78 MMHG | SYSTOLIC BLOOD PRESSURE: 122 MMHG | BODY MASS INDEX: 39.49 KG/M2 | WEIGHT: 237 LBS | OXYGEN SATURATION: 100 % | HEIGHT: 65 IN | TEMPERATURE: 97.2 F | HEART RATE: 69 BPM | RESPIRATION RATE: 16 BRPM

## 2022-06-10 DIAGNOSIS — E78.2 MIXED HYPERLIPIDEMIA: ICD-10-CM

## 2022-06-10 DIAGNOSIS — Z11.59 ENCOUNTER FOR SCREENING FOR OTHER VIRAL DISEASES: ICD-10-CM

## 2022-06-10 DIAGNOSIS — Z00.00 ENCOUNTER FOR MEDICAL EXAMINATION TO ESTABLISH CARE: Primary | ICD-10-CM

## 2022-06-10 DIAGNOSIS — E55.9 HYPOVITAMINOSIS D: ICD-10-CM

## 2022-06-10 DIAGNOSIS — G47.33 OSA ON CPAP: ICD-10-CM

## 2022-06-10 DIAGNOSIS — I10 ESSENTIAL HYPERTENSION: ICD-10-CM

## 2022-06-10 DIAGNOSIS — R20.0 RIGHT ARM NUMBNESS: ICD-10-CM

## 2022-06-10 DIAGNOSIS — R20.2 PARESTHESIA: ICD-10-CM

## 2022-06-10 DIAGNOSIS — R00.2 PALPITATIONS: ICD-10-CM

## 2022-06-10 LAB
TSH SERPL DL<=0.005 MIU/L-ACNC: 1.66 MU/L (ref 0.4–4)
VIT B12 SERPL-MCNC: 600 PG/ML (ref 193–986)

## 2022-06-10 PROCEDURE — 99214 OFFICE O/P EST MOD 30 MIN: CPT | Performed by: FAMILY MEDICINE

## 2022-06-10 PROCEDURE — 82306 VITAMIN D 25 HYDROXY: CPT | Performed by: FAMILY MEDICINE

## 2022-06-10 PROCEDURE — 36415 COLL VENOUS BLD VENIPUNCTURE: CPT | Performed by: FAMILY MEDICINE

## 2022-06-10 PROCEDURE — 84443 ASSAY THYROID STIM HORMONE: CPT | Performed by: FAMILY MEDICINE

## 2022-06-10 PROCEDURE — 86803 HEPATITIS C AB TEST: CPT | Performed by: FAMILY MEDICINE

## 2022-06-10 PROCEDURE — 82607 VITAMIN B-12: CPT | Performed by: FAMILY MEDICINE

## 2022-06-10 SDOH — HEALTH STABILITY: PHYSICAL HEALTH: ON AVERAGE, HOW MANY MINUTES DO YOU ENGAGE IN EXERCISE AT THIS LEVEL?: 0 MIN

## 2022-06-10 SDOH — HEALTH STABILITY: PHYSICAL HEALTH: ON AVERAGE, HOW MANY DAYS PER WEEK DO YOU ENGAGE IN MODERATE TO STRENUOUS EXERCISE (LIKE A BRISK WALK)?: 0 DAYS

## 2022-06-10 ASSESSMENT — LIFESTYLE VARIABLES
HOW MANY STANDARD DRINKS CONTAINING ALCOHOL DO YOU HAVE ON A TYPICAL DAY: 1 OR 2
HOW OFTEN DO YOU HAVE A DRINK CONTAINING ALCOHOL: 2-4 TIMES A MONTH
AUDIT-C TOTAL SCORE: 2
SKIP TO QUESTIONS 9-10: 1
HOW OFTEN DO YOU HAVE SIX OR MORE DRINKS ON ONE OCCASION: NEVER

## 2022-06-10 ASSESSMENT — PAIN SCALES - GENERAL: PAINLEVEL: NO PAIN (0)

## 2022-06-10 NOTE — NURSING NOTE
"Chief Complaint   Patient presents with     Establish Care     Lipids     Hypertension       Initial /78   Pulse 69   Temp 97.2  F (36.2  C) (Tympanic)   Resp 16   Ht 1.651 m (5' 5\")   Wt 107.5 kg (237 lb)   SpO2 100%   BMI 39.44 kg/m   Estimated body mass index is 39.44 kg/m  as calculated from the following:    Height as of this encounter: 1.651 m (5' 5\").    Weight as of this encounter: 107.5 kg (237 lb).  Medication Reconciliation: complete  Kristyn Jeffers LPN    "

## 2022-06-10 NOTE — COMMUNITY RESOURCES LIST (ENGLISH)
06/10/2022   [facility]  Jennifer Herbert  For questions about this resource list or additional care needs, please contact your primary care clinic or care manager.  Phone: [phone]   Email: N/A   Address: [address] [place] [id number]   Hours: N/A        Exercise and Recreation       Gym or workout facility  1  [employer] [facility] [place] Distance: 0.46 miles      COVID-19 Status: Regular Operations   [address] [facility] [place] [address]  Language: English  Hours: Mon - Sun Open 24 Hours  Fees: Self Pay   Phone: [phone] Email: [email or web address] Website: [email or web address]     2  [employer] Distance: 19.14 miles      COVID-19 Status: Regular Operations   [address] [place] [address]  Language: English  Hours: Mon - Fri 5:00 AM - 7:00 PM , Sat 9:00 AM - 5:00 PM  Fees: Free, Self Pay   Phone: [phone] Email: sol@[employer] Website: [email or web address]/          Important Numbers & Websites       Emergency Services   911  City Services   311  Poison Control   (123) 825-4326  Suicide Prevention Lifeline   (519) 909-8475 (TALK)  Child Abuse Hotline   (359) 910-2613 (4-A-Child)  Sexual Assault Hotline   (364) 119-9124 (HOPE)  National Runaway Safeline   (104) 219-6150 (RUNAWAY)  All-Options Talkline   (101) 923-1722  Substance Abuse Referral   (635) 766-2786 (HELP)

## 2022-06-13 LAB
DEPRECATED CALCIDIOL+CALCIFEROL SERPL-MC: 28 UG/L (ref 20–75)
HCV AB SERPL QL IA: NONREACTIVE

## 2022-06-14 ENCOUNTER — HOSPITAL ENCOUNTER (OUTPATIENT)
Dept: CARDIOLOGY | Facility: HOSPITAL | Age: 52
Discharge: HOME | End: 2022-06-14
Attending: FAMILY MEDICINE | Admitting: INTERNAL MEDICINE
Payer: COMMERCIAL

## 2022-06-14 DIAGNOSIS — R00.2 PALPITATIONS: ICD-10-CM

## 2022-06-14 PROCEDURE — 93244 EXT ECG>48HR<7D REV&INTERPJ: CPT | Performed by: INTERNAL MEDICINE

## 2022-06-14 PROCEDURE — 93242 EXT ECG>48HR<7D RECORDING: CPT

## 2022-09-04 ENCOUNTER — HEALTH MAINTENANCE LETTER (OUTPATIENT)
Age: 52
End: 2022-09-04

## 2022-09-09 DIAGNOSIS — E78.00 PURE HYPERCHOLESTEROLEMIA: ICD-10-CM

## 2022-09-13 RX ORDER — ATORVASTATIN CALCIUM 10 MG/1
TABLET, FILM COATED ORAL
Qty: 90 TABLET | Refills: 3 | Status: SHIPPED | OUTPATIENT
Start: 2022-09-13 | End: 2022-12-16

## 2022-09-13 NOTE — TELEPHONE ENCOUNTER
Lipitor      Last Written Prescription Date:  2/15/22  Last Fill Quantity: 90,   # refills: 1  Last Office Visit: 6/10/22  Future Office visit:    Next 5 appointments (look out 90 days)    Oct 03, 2022  9:15 AM  (Arrive by 9:00 AM)  PHYSICAL with Jennifer Herbert MD  [facility] [place] ([facility] [place] ) [address]  [place] [address]  [phone]           Routing refill request to provider for review/approval because:

## 2022-09-14 DIAGNOSIS — I10 ESSENTIAL HYPERTENSION: ICD-10-CM

## 2022-09-14 RX ORDER — LISINOPRIL 20 MG/1
20 TABLET ORAL 2 TIMES DAILY
Qty: 180 TABLET | Refills: 0 | Status: SHIPPED | OUTPATIENT
Start: 2022-09-14 | End: 2022-12-16

## 2022-10-12 DIAGNOSIS — I10 ESSENTIAL HYPERTENSION: ICD-10-CM

## 2022-10-13 RX ORDER — METOPROLOL SUCCINATE 100 MG/1
100 TABLET, EXTENDED RELEASE ORAL DAILY
Qty: 90 TABLET | Refills: 1 | Status: SHIPPED | OUTPATIENT
Start: 2022-10-13 | End: 2022-12-16

## 2022-10-13 NOTE — TELEPHONE ENCOUNTER
Toprolo      Last Written Prescription Date:  09/02/2021  Last Fill Quantity: 90,   # refills: 3  Last Office Visit: 06/10/2022

## 2022-10-19 DIAGNOSIS — I10 ESSENTIAL HYPERTENSION: ICD-10-CM

## 2022-10-19 RX ORDER — AMLODIPINE BESYLATE 5 MG/1
5 TABLET ORAL DAILY
Qty: 7 TABLET | Refills: 0 | Status: SHIPPED | OUTPATIENT
Start: 2022-10-19 | End: 2022-12-16

## 2022-10-19 RX ORDER — METOPROLOL SUCCINATE 100 MG/1
100 TABLET, EXTENDED RELEASE ORAL DAILY
Qty: 7 TABLET | Refills: 0 | Status: SHIPPED | OUTPATIENT
Start: 2022-10-19 | End: 2022-12-16

## 2022-12-14 DIAGNOSIS — I10 ESSENTIAL HYPERTENSION: ICD-10-CM

## 2022-12-14 RX ORDER — AMLODIPINE BESYLATE 5 MG/1
5 TABLET ORAL DAILY
Qty: 90 TABLET | Refills: 3 | Status: SHIPPED | OUTPATIENT
Start: 2022-12-14 | End: 2024-01-16

## 2022-12-14 NOTE — TELEPHONE ENCOUNTER
Norvasc      Last Written Prescription Date:  12/02/2021  Last Fill Quantity: 90,   # refills: 3  Last Office Visit: 06/10/2022

## 2022-12-16 ENCOUNTER — OFFICE VISIT (OUTPATIENT)
Dept: FAMILY MEDICINE | Facility: OTHER | Age: 52
End: 2022-12-16
Attending: FAMILY MEDICINE
Payer: COMMERCIAL

## 2022-12-16 VITALS
DIASTOLIC BLOOD PRESSURE: 70 MMHG | SYSTOLIC BLOOD PRESSURE: 118 MMHG | OXYGEN SATURATION: 98 % | BODY MASS INDEX: 38.41 KG/M2 | TEMPERATURE: 97.7 F | HEIGHT: 66 IN | WEIGHT: 239 LBS | HEART RATE: 70 BPM

## 2022-12-16 DIAGNOSIS — E55.9 HYPOVITAMINOSIS D: ICD-10-CM

## 2022-12-16 DIAGNOSIS — R73.09 ELEVATED GLUCOSE: ICD-10-CM

## 2022-12-16 DIAGNOSIS — Z00.00 ROUTINE GENERAL MEDICAL EXAMINATION AT A HEALTH CARE FACILITY: Primary | ICD-10-CM

## 2022-12-16 DIAGNOSIS — E78.00 PURE HYPERCHOLESTEROLEMIA: ICD-10-CM

## 2022-12-16 DIAGNOSIS — E78.2 MIXED HYPERLIPIDEMIA: ICD-10-CM

## 2022-12-16 DIAGNOSIS — I10 ESSENTIAL HYPERTENSION: ICD-10-CM

## 2022-12-16 LAB
ALBUMIN SERPL BCG-MCNC: 4.5 G/DL (ref 3.5–5.2)
ALP SERPL-CCNC: 93 U/L (ref 40–129)
ALT SERPL W P-5'-P-CCNC: 23 U/L (ref 10–50)
ANION GAP SERPL CALCULATED.3IONS-SCNC: 10 MMOL/L (ref 7–15)
AST SERPL W P-5'-P-CCNC: 17 U/L (ref 10–50)
BILIRUB SERPL-MCNC: 0.3 MG/DL
BUN SERPL-MCNC: 15.7 MG/DL (ref 6–20)
CALCIUM SERPL-MCNC: 9.4 MG/DL (ref 8.6–10)
CHLORIDE SERPL-SCNC: 106 MMOL/L (ref 98–107)
CHOLEST SERPL-MCNC: 167 MG/DL
CREAT SERPL-MCNC: 0.88 MG/DL (ref 0.67–1.17)
DEPRECATED HCO3 PLAS-SCNC: 25 MMOL/L (ref 22–29)
EST. AVERAGE GLUCOSE BLD GHB EST-MCNC: 134 MG/DL
GFR SERPL CREATININE-BSD FRML MDRD: >90 ML/MIN/1.73M2
GLUCOSE SERPL-MCNC: 131 MG/DL (ref 70–99)
HBA1C MFR BLD: 6.3 %
HDLC SERPL-MCNC: 40 MG/DL
LDLC SERPL CALC-MCNC: 101 MG/DL
NONHDLC SERPL-MCNC: 127 MG/DL
POTASSIUM SERPL-SCNC: 4.3 MMOL/L (ref 3.4–5.3)
PROT SERPL-MCNC: 7.5 G/DL (ref 6.4–8.3)
SODIUM SERPL-SCNC: 141 MMOL/L (ref 136–145)
TRIGL SERPL-MCNC: 128 MG/DL

## 2022-12-16 PROCEDURE — 80053 COMPREHEN METABOLIC PANEL: CPT | Performed by: FAMILY MEDICINE

## 2022-12-16 PROCEDURE — 99396 PREV VISIT EST AGE 40-64: CPT | Performed by: FAMILY MEDICINE

## 2022-12-16 PROCEDURE — 82306 VITAMIN D 25 HYDROXY: CPT | Performed by: FAMILY MEDICINE

## 2022-12-16 PROCEDURE — 36415 COLL VENOUS BLD VENIPUNCTURE: CPT | Performed by: FAMILY MEDICINE

## 2022-12-16 PROCEDURE — 83036 HEMOGLOBIN GLYCOSYLATED A1C: CPT | Performed by: FAMILY MEDICINE

## 2022-12-16 PROCEDURE — 80061 LIPID PANEL: CPT | Performed by: FAMILY MEDICINE

## 2022-12-16 RX ORDER — LISINOPRIL 20 MG/1
20 TABLET ORAL 2 TIMES DAILY
Qty: 180 TABLET | Refills: 3 | Status: SHIPPED | OUTPATIENT
Start: 2022-12-16 | End: 2024-01-16

## 2022-12-16 RX ORDER — METOPROLOL SUCCINATE 100 MG/1
100 TABLET, EXTENDED RELEASE ORAL DAILY
Qty: 90 TABLET | Refills: 3 | Status: SHIPPED | OUTPATIENT
Start: 2022-12-16 | End: 2023-10-23

## 2022-12-16 RX ORDER — ATORVASTATIN CALCIUM 10 MG/1
10 TABLET, FILM COATED ORAL DAILY
Qty: 90 TABLET | Refills: 3 | Status: SHIPPED | OUTPATIENT
Start: 2022-12-16 | End: 2024-01-16

## 2022-12-16 RX ORDER — GEMFIBROZIL 600 MG/1
600 TABLET, FILM COATED ORAL 2 TIMES DAILY
Qty: 180 TABLET | Refills: 3 | Status: SHIPPED | OUTPATIENT
Start: 2022-12-16 | End: 2024-01-16

## 2022-12-16 ASSESSMENT — ENCOUNTER SYMPTOMS
HEADACHES: 0
PALPITATIONS: 0
SHORTNESS OF BREATH: 0
ARTHRALGIAS: 0
CHILLS: 0
HEMATURIA: 0
NERVOUS/ANXIOUS: 0
FREQUENCY: 0
SORE THROAT: 0
NAUSEA: 0
DIARRHEA: 0
ABDOMINAL PAIN: 0
FEVER: 0
DYSURIA: 0
HEARTBURN: 0
HEMATOCHEZIA: 0
JOINT SWELLING: 0
CONSTIPATION: 0
MYALGIAS: 0
DIZZINESS: 0
PARESTHESIAS: 0
COUGH: 0
EYE PAIN: 0
WEAKNESS: 0

## 2022-12-16 ASSESSMENT — LIFESTYLE VARIABLES
SKIP TO QUESTIONS 9-10: 1
HOW OFTEN DO YOU HAVE SIX OR MORE DRINKS ON ONE OCCASION: NEVER
AUDIT-C TOTAL SCORE: 1
HOW OFTEN DO YOU HAVE A DRINK CONTAINING ALCOHOL: MONTHLY OR LESS
HOW MANY STANDARD DRINKS CONTAINING ALCOHOL DO YOU HAVE ON A TYPICAL DAY: 1 OR 2

## 2022-12-16 ASSESSMENT — PAIN SCALES - GENERAL: PAINLEVEL: NO PAIN (0)
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
## 2022-12-16 NOTE — LETTER
December 20, 2022      Harsh Marley  [address] [place] [address]        Dear ,    We are writing to inform you of your test results.    A1c is up slightly but still in pre-diabetic range. Vitamin d is low, will send rx to pharmacy, take weekly with a meal that has fat in it and then take 20,000 international unit(s) weekly OTC.     Resulted Orders   Comprehensive metabolic panel   Result Value Ref Range    Sodium 141 136 - 145 mmol/L    Potassium 4.3 3.4 - 5.3 mmol/L    Chloride 106 98 - 107 mmol/L    Carbon Dioxide (CO2) 25 22 - 29 mmol/L    Anion Gap 10 7 - 15 mmol/L    Urea Nitrogen 15.7 6.0 - 20.0 mg/dL    Creatinine 0.88 0.67 - 1.17 mg/dL    Calcium 9.4 8.6 - 10.0 mg/dL    Glucose 131 (H) 70 - 99 mg/dL    Alkaline Phosphatase 93 40 - 129 U/L    AST 17 10 - 50 U/L    ALT 23 10 - 50 U/L    Protein Total 7.5 6.4 - 8.3 g/dL    Albumin 4.5 3.5 - 5.2 g/dL    Bilirubin Total 0.3 <=1.2 mg/dL    GFR Estimate >90 >60 mL/min/1.73m2      Comment:      Effective December 21, 2021 eGFRcr in adults is calculated using the 2021 CKD-EPI creatinine equation which includes age and gender (Jorge et al., NEJM, DOI: 10.1056/XYCDyk2929619)   Vitamin D Deficiency   Result Value Ref Range    Vitamin D, Total (25-Hydroxy) 23 20 - 75 ug/L    Narrative    Season, race, dietary intake, and treatment affect the concentration of 25-hydroxy-Vitamin D. Values may decrease during winter months and increase during summer months. Values 20-29 ug/L may indicate Vitamin D insufficiency and values <20 ug/L may indicate Vitamin D deficiency.    Vitamin D determination is routinely performed by an immunoassay specific for 25 hydroxyvitamin D3.  If an individual is on vitamin D2(ergocalciferol) supplementation, please specify 25 OH vitamin D2 and D3 level determination by LCMSMS test VITD23.     Lipid Profile (Chol, Trig, HDL, LDL calc)   Result Value Ref Range    Cholesterol 167 <200 mg/dL    Triglycerides 128 <150 mg/dL    Direct  Measure HDL 40 >=40 mg/dL    LDL Cholesterol Calculated 101 (H) <=100 mg/dL    Non HDL Cholesterol 127 <130 mg/dL    Narrative    Cholesterol  Desirable:  <200 mg/dL    Triglycerides  Normal:  Less than 150 mg/dL  Borderline High:  150-199 mg/dL  High:  200-499 mg/dL  Very High:  Greater than or equal to 500 mg/dL    Direct Measure HDL  Female:  Greater than or equal to 50 mg/dL   Male:  Greater than or equal to 40 mg/dL    LDL Cholesterol  Desirable:  <100mg/dL  Above Desirable:  100-129 mg/dL   Borderline High:  130-159 mg/dL   High:  160-189 mg/dL   Very High:  >= 190 mg/dL    Non HDL Cholesterol  Desirable:  130 mg/dL  Above Desirable:  130-159 mg/dL  Borderline High:  160-189 mg/dL  High:  190-219 mg/dL  Very High:  Greater than or equal to 220 mg/dL   Hemoglobin A1c   Result Value Ref Range    Estimated Average Glucose 134 mg/dL    Hemoglobin A1C 6.3 (H) <5.7 %      Comment:      Normal <5.7%   Prediabetes 5.7-6.4%    Diabetes 6.5% or higher     Note: Adopted from ADA consensus guidelines.       If you have any questions or concerns, please call the clinic at the number listed above.       Sincerely,      Jennifer Herbert MD

## 2022-12-18 LAB — DEPRECATED CALCIDIOL+CALCIFEROL SERPL-MC: 23 UG/L (ref 20–75)

## 2023-02-10 ENCOUNTER — DOCUMENTATION ONLY (OUTPATIENT)
Dept: FAMILY MEDICINE | Facility: OTHER | Age: 53
End: 2023-02-10

## 2023-02-10 DIAGNOSIS — G47.33 OBSTRUCTIVE SLEEP APNEA (ADULT) (PEDIATRIC): Primary | ICD-10-CM

## 2023-10-22 DIAGNOSIS — I10 ESSENTIAL HYPERTENSION: ICD-10-CM

## 2023-10-23 RX ORDER — METOPROLOL SUCCINATE 100 MG/1
100 TABLET, EXTENDED RELEASE ORAL DAILY
Qty: 90 TABLET | Refills: 0 | Status: SHIPPED | OUTPATIENT
Start: 2023-10-23 | End: 2024-01-03

## 2023-10-23 NOTE — TELEPHONE ENCOUNTER
Metoprolol       Last Written Prescription Date:  12/16/2022  Last Fill Quantity: 90,   # refills: 3  Last Office Visit: 12/16/2022  Future Office visit:    Next 5 appointments (look out 90 days)      Dec 20, 2023  9:15 AM  (Arrive by 9:00 AM)  PHYSICAL with Jennifer Herbert MD  [facility] [place] ([facility] [place] ) [address]  [place] [address]  [phone]

## 2023-12-20 ENCOUNTER — LAB (OUTPATIENT)
Dept: LAB | Facility: OTHER | Age: 53
End: 2023-12-20
Attending: FAMILY MEDICINE
Payer: COMMERCIAL

## 2023-12-20 ENCOUNTER — OFFICE VISIT (OUTPATIENT)
Dept: FAMILY MEDICINE | Facility: OTHER | Age: 53
End: 2023-12-20
Attending: FAMILY MEDICINE
Payer: COMMERCIAL

## 2023-12-20 VITALS
HEIGHT: 66 IN | TEMPERATURE: 98.4 F | OXYGEN SATURATION: 96 % | SYSTOLIC BLOOD PRESSURE: 115 MMHG | HEART RATE: 67 BPM | DIASTOLIC BLOOD PRESSURE: 70 MMHG | BODY MASS INDEX: 38.63 KG/M2 | WEIGHT: 240.4 LBS

## 2023-12-20 DIAGNOSIS — I10 ESSENTIAL HYPERTENSION: ICD-10-CM

## 2023-12-20 DIAGNOSIS — Z71.89 ACP (ADVANCE CARE PLANNING): ICD-10-CM

## 2023-12-20 DIAGNOSIS — R73.09 ELEVATED GLUCOSE: ICD-10-CM

## 2023-12-20 DIAGNOSIS — G47.33 OSA ON CPAP: ICD-10-CM

## 2023-12-20 DIAGNOSIS — E55.9 HYPOVITAMINOSIS D: ICD-10-CM

## 2023-12-20 DIAGNOSIS — Z00.00 ROUTINE GENERAL MEDICAL EXAMINATION AT A HEALTH CARE FACILITY: Primary | ICD-10-CM

## 2023-12-20 DIAGNOSIS — E78.2 MIXED HYPERLIPIDEMIA: ICD-10-CM

## 2023-12-20 DIAGNOSIS — E11.65 TYPE 2 DIABETES MELLITUS WITH HYPERGLYCEMIA, WITHOUT LONG-TERM CURRENT USE OF INSULIN (H): ICD-10-CM

## 2023-12-20 PROBLEM — E11.9 DIABETES MELLITUS, TYPE 2 (H): Status: ACTIVE | Noted: 2023-12-20

## 2023-12-20 LAB
ALBUMIN SERPL BCG-MCNC: 4.7 G/DL (ref 3.5–5.2)
ALP SERPL-CCNC: 108 U/L (ref 40–150)
ALT SERPL W P-5'-P-CCNC: 21 U/L (ref 0–70)
ANION GAP SERPL CALCULATED.3IONS-SCNC: 11 MMOL/L (ref 7–15)
AST SERPL W P-5'-P-CCNC: 20 U/L (ref 0–45)
BILIRUB SERPL-MCNC: 0.3 MG/DL
BUN SERPL-MCNC: 13.6 MG/DL (ref 6–20)
CALCIUM SERPL-MCNC: 9.5 MG/DL (ref 8.6–10)
CHLORIDE SERPL-SCNC: 104 MMOL/L (ref 98–107)
CHOLEST SERPL-MCNC: 165 MG/DL
CREAT SERPL-MCNC: 0.77 MG/DL (ref 0.67–1.17)
DEPRECATED HCO3 PLAS-SCNC: 24 MMOL/L (ref 22–29)
EGFRCR SERPLBLD CKD-EPI 2021: >90 ML/MIN/1.73M2
EST. AVERAGE GLUCOSE BLD GHB EST-MCNC: 160 MG/DL
FASTING STATUS PATIENT QL REPORTED: YES
GLUCOSE SERPL-MCNC: 149 MG/DL (ref 70–99)
HBA1C MFR BLD: 7.2 %
HDLC SERPL-MCNC: 45 MG/DL
LDLC SERPL CALC-MCNC: 97 MG/DL
NONHDLC SERPL-MCNC: 120 MG/DL
POTASSIUM SERPL-SCNC: 4.3 MMOL/L (ref 3.4–5.3)
PROT SERPL-MCNC: 7.7 G/DL (ref 6.4–8.3)
SODIUM SERPL-SCNC: 139 MMOL/L (ref 135–145)
TRIGL SERPL-MCNC: 114 MG/DL

## 2023-12-20 PROCEDURE — 82306 VITAMIN D 25 HYDROXY: CPT | Performed by: FAMILY MEDICINE

## 2023-12-20 PROCEDURE — 80053 COMPREHEN METABOLIC PANEL: CPT

## 2023-12-20 PROCEDURE — 83036 HEMOGLOBIN GLYCOSYLATED A1C: CPT

## 2023-12-20 PROCEDURE — 80061 LIPID PANEL: CPT

## 2023-12-20 PROCEDURE — 99214 OFFICE O/P EST MOD 30 MIN: CPT | Mod: 25 | Performed by: FAMILY MEDICINE

## 2023-12-20 PROCEDURE — 99396 PREV VISIT EST AGE 40-64: CPT | Performed by: FAMILY MEDICINE

## 2023-12-20 PROCEDURE — 36415 COLL VENOUS BLD VENIPUNCTURE: CPT

## 2023-12-20 RX ORDER — METFORMIN HCL 500 MG
500 TABLET, EXTENDED RELEASE 24 HR ORAL AT BEDTIME
Qty: 60 TABLET | Refills: 3 | Status: SHIPPED | OUTPATIENT
Start: 2023-12-20 | End: 2024-01-16

## 2023-12-20 SDOH — HEALTH STABILITY: PHYSICAL HEALTH: ON AVERAGE, HOW MANY DAYS PER WEEK DO YOU ENGAGE IN MODERATE TO STRENUOUS EXERCISE (LIKE A BRISK WALK)?: 0 DAYS

## 2023-12-20 SDOH — HEALTH STABILITY: PHYSICAL HEALTH: ON AVERAGE, HOW MANY MINUTES DO YOU ENGAGE IN EXERCISE AT THIS LEVEL?: 0 MIN

## 2023-12-20 ASSESSMENT — ENCOUNTER SYMPTOMS
DIZZINESS: 0
NERVOUS/ANXIOUS: 0
CHILLS: 0
SHORTNESS OF BREATH: 0
PARESTHESIAS: 0
HEARTBURN: 0
CONSTIPATION: 0
HEMATURIA: 0
DIARRHEA: 0
FREQUENCY: 0
ARTHRALGIAS: 0
MYALGIAS: 0
COUGH: 0
EYE PAIN: 0
SORE THROAT: 0
JOINT SWELLING: 0
NAUSEA: 0
FEVER: 0
ABDOMINAL PAIN: 0
HEMATOCHEZIA: 0
PALPITATIONS: 0
HEADACHES: 0
WEAKNESS: 0
DYSURIA: 0

## 2023-12-20 ASSESSMENT — PAIN SCALES - GENERAL: PAINLEVEL: NO PAIN (0)

## 2023-12-20 ASSESSMENT — LIFESTYLE VARIABLES
AUDIT-C TOTAL SCORE: 2
HOW MANY STANDARD DRINKS CONTAINING ALCOHOL DO YOU HAVE ON A TYPICAL DAY: 1 OR 2
HOW OFTEN DO YOU HAVE SIX OR MORE DRINKS ON ONE OCCASION: NEVER
SKIP TO QUESTIONS 9-10: 1
HOW OFTEN DO YOU HAVE A DRINK CONTAINING ALCOHOL: 2-4 TIMES A MONTH

## 2023-12-20 NOTE — COMMUNITY RESOURCES LIST (ENGLISH)
12/20/2023   [facility] - Outpatient Clinics  N/A  For additional resource needs, please contact your health insurance member services or your primary care team.  Phone: [phone]   Email: N/A   Address: [facility] [place] [address]   Hours: N/A        Financial Stability       Utility payment assistance  1  [place] [employer] - [place]'s Telephone Assistance Plan (TAP) and Federal Lifeline and Affordable Connectivity Program (ACP) Distance: 167.52 miles      Phone/Virtual   [address] [place] [address]  Language: English  Fees: Free   Phone: (535) 683-9757 Email: consumer.puc@[facility].mn. Website: https://[place].gov/puc/consumers/telephone/     2  [place] [employer] - Energy and Utilities Distance: 169.36 miles      In-Person, Phone/Virtual   [address] [place] [address]  Language: English  Hours: Mon - Fri 8:30 AM - 4:30 PM  Fees: Free   Phone: (357) 210-9599 Website: https://[place]/[employer]/energy/consumer-assistance/energy-assistance-program/          Important Numbers & Websites       [facility]   211 211itedway.org  Poison Control   (264) 834-1209 [place]poison[place]  Suicide and Crisis Lifeline   988 8[facility].org  Childhelp [place] Child Abuse Hotline   176.419.3859 Childhelphotline.org  National Sexual Assault Hotline   (688) 323-5113 (HOPE) Rainn.org  National Runaway Safeline   (483) 376-3262 (RUNAWAY) 1800runaway.org  Pregnancy & Postpartum Support [place]   Call/text 993-662-5549 Ppsupportmn.org  Substance Abuse National Helpline ([facility]   334-319-HELP (8445) Findtreatment.gov  Emergency Services   911

## 2023-12-20 NOTE — COMMUNITY RESOURCES LIST (ENGLISH)
12/20/2023   [facility] - Outpatient Clinics  N/A  For additional resource needs, please contact your health insurance member services or your primary care team.  Phone: [phone]   Email: N/A   Address: [address] [place] [address]   Hours: N/A        Exercise and Recreation       Gym or workout facility  1  [employer] [facility] Distance: 19.28 miles      In-Person   [address] [place] [address]  Language: English  Hours: Mon - Sun Open 24 Hours  Fees: Insurance, Self Pay, Sliding Fee   Phone: (680) 196-6073 Email: [email or web address] Website: [email or web address]          Financial Stability       Utility payment assistance  2  [place] Public [employer] [facility] - [place]'s Telephone Assistance Plan (TAP) and [facility] Lifeline and Affordable Connectivity Program (ACP) Distance: 167.52 miles      Phone/Virtual   [address] [place] [address]  Language: English  Fees: Free   Phone: (115) 178-4100 Email: consumer.puc@[facility].mn. Website: https://mn.[facility]/puc/consumers/telephone/     3  [place] [facility] - Energy and Utilities Distance: 169.36 miles      In-Person, Phone/Virtual   [address] [place] [address]  Language: English  Hours: Mon - Fri 8:30 AM - 4:30 PM  Fees: Free   Phone: (840) 804-9379 Website: https://[place].[facility]/[employer]/energy/consumer-assistance/energy-assistance-program/          Important Numbers & Websites       [facility]   211 211itedway.org  Poison Control   (365) 656-1183 [place]poison.[place]  Suicide and Crisis Lifeline   988 988lifeline.org  Childhelp National Child Abuse Hotline   368.563.7765 Childhelphotline.org  National Sexual Assault Hotline   (964) 866-3746 (HOPE) Rainn.org  National Runaway Safeline   (118) 255-5659 (RUNAWAY) [facility].org  Pregnancy & Postpartum Support [place]   Call/text 482-914-5617 Ppsupportmn.org  Substance Abuse National Helpline ([facility]    800-622-HELP (4186) Findtreatment.gov  Emergency Services   551

## 2023-12-20 NOTE — COMMUNITY RESOURCES LIST (ENGLISH)
12/20/2023   [facility]  N/A  For questions about this resource list or additional care needs, please contact your primary care clinic or care manager.  Phone: [phone]   Email: N/A   Address: [address] [place] [address]   Hours: N/A        Financial Stability       Utility payment assistance  1  [facility] and [facility] [place] Distance: 2.06 miles      In-Person   [address] [place] [address]  Language: English  Hours: Mon - Fri 9:00 AM - 12:00 PM , Mon - Fri 1:00 PM - 5:00 PM  Fees: Free, Self Pay   Phone: (606) 527-4742 Email: mesha@[email or web address] Website: https://[employer].[facility].org/[facility]     2  [facility] [employer] [facility] Office - Energy Assistance Program (EAP) Distance: 20.51 miles      In-Person   [address] [place] [address]  Language: English  Hours: Mon - Fri 8:00 AM - 4:30 PM  Fees: Free   Phone: (807) 662-5908 Email: contactus@[employer].org Website: http://www.aeoa.org          Important Numbers & Websites       Emergency Services   911  City Services   311  Poison Control   (636) 412-3461  Suicide Prevention Lifeline   (283) 796-2255 (TALK)  Child Abuse Hotline   (795) 152-5584 (4-A-Child)  Sexual Assault Hotline   (297) 277-3094 (HOPE)  National Runaway Safeline   (773) 796-9085 (RUNAWAY)  All-Options Talkline   (866) 748-3195  Substance Abuse Referral   (786) 650-9485 (HELP)

## 2023-12-20 NOTE — LETTER
December 26, 2023      Harsh Hardengavin  [address] [place] [address]        Dear ,    We are writing to inform you of your test results.    Per provider.   Vitamin d a little low -- recommend taking 10,000 international unit(s) weekly over the counter with a meal. Please call us at [phone] with any questions.   Resulted Orders   Vitamin D Deficiency   Result Value Ref Range    Vitamin D, Total (25-Hydroxy) 31 20 - 50 ng/mL      Comment:      optimum levels    Narrative    Season, race, dietary intake, and treatment affect the concentration of 25-hydroxy-Vitamin D. Values may decrease during winter months and increase during summer months.    Vitamin D determination is routinely performed by an immunoassay specific for 25 hydroxyvitamin D3.  If an individual is on vitamin D2(ergocalciferol) supplementation, please specify 25 OH vitamin D2 and D3 level determination by LCMSMS test VITD23.         If you have any questions or concerns, please call the clinic at the number listed above.       Sincerely,      Jennifer Herbert MD

## 2023-12-20 NOTE — COMMUNITY RESOURCES LIST (ENGLISH)
12/20/2023   [facility] PHARMA  N/A  For questions about this resource list or additional care needs, please contact your primary care clinic or care manager.  Phone: [phone]   Email: N/A   Address: [address] [place] [id number]   Hours: N/A        Financial Stability       Utility payment assistance  1  [facility] and [facility] [place] Distance: 2.06 miles      In-Person   [address] [place] [address]  Language: English  Hours: Mon - Fri 9:00 AM - 12:00 PM , Mon - Fri 1:00 PM - 5:00 PM  Fees: Free, Self Pay   Phone: (772) 549-6641 Email: [email or web address] Website: [email or web address]     2  [facility] [employer] [facility] Office - Energy Assistance Program (EAP) Distance: 20.51 miles      In-Person   [address] [place] [address]  Language: English  Hours: Mon - Fri 8:00 AM - 4:30 PM  Fees: Free   Phone: (179) 369-2712 Email: contactus@[employer].org Website: http://www.aeoa.org          Important Numbers & Websites       Emergency Services   911  City Services   311  Poison Control   (791) 970-1995  Suicide Prevention Lifeline   (633) 176-9269 (TALK)  Child Abuse Hotline   (174) 390-9080 (4-A-Child)  Sexual Assault Hotline   (859) 909-3961 (HOPE)  National Runaway Safeline   (992) 682-1277 (RUNAWAY)  All-Options Talkline   (434) 708-1515  Substance Abuse Referral   (323) 628-9894 (HELP)
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
## 2023-12-21 LAB — VIT D+METAB SERPL-MCNC: 31 NG/ML (ref 20–50)

## 2024-01-16 ENCOUNTER — HOSPITAL ENCOUNTER (OUTPATIENT)
Dept: EDUCATION SERVICES | Facility: HOSPITAL | Age: 54
Discharge: HOME | End: 2024-01-16
Attending: DIETITIAN, REGISTERED | Admitting: DIETITIAN, REGISTERED
Payer: COMMERCIAL

## 2024-01-16 ENCOUNTER — MYC REFILL (OUTPATIENT)
Dept: FAMILY MEDICINE | Facility: OTHER | Age: 54
End: 2024-01-16

## 2024-01-16 VITALS
WEIGHT: 241 LBS | OXYGEN SATURATION: 96 % | HEART RATE: 69 BPM | RESPIRATION RATE: 16 BRPM | DIASTOLIC BLOOD PRESSURE: 81 MMHG | SYSTOLIC BLOOD PRESSURE: 138 MMHG | BODY MASS INDEX: 38.73 KG/M2 | HEIGHT: 66 IN

## 2024-01-16 DIAGNOSIS — I10 ESSENTIAL HYPERTENSION: ICD-10-CM

## 2024-01-16 DIAGNOSIS — E11.65 TYPE 2 DIABETES MELLITUS WITH HYPERGLYCEMIA, WITHOUT LONG-TERM CURRENT USE OF INSULIN (H): Primary | ICD-10-CM

## 2024-01-16 DIAGNOSIS — E78.00 PURE HYPERCHOLESTEROLEMIA: ICD-10-CM

## 2024-01-16 DIAGNOSIS — E11.65 TYPE 2 DIABETES MELLITUS WITH HYPERGLYCEMIA, WITHOUT LONG-TERM CURRENT USE OF INSULIN (H): ICD-10-CM

## 2024-01-16 PROCEDURE — G0108 DIAB MANAGE TRN  PER INDIV: HCPCS | Performed by: DIETITIAN, REGISTERED

## 2024-01-16 RX ORDER — ACYCLOVIR 400 MG/1
TABLET ORAL
Qty: 3 EACH | Refills: 5 | Status: SHIPPED | OUTPATIENT
Start: 2024-01-16 | End: 2024-03-25

## 2024-01-16 ASSESSMENT — PAIN SCALES - GENERAL: PAINLEVEL: MILD PAIN (3)

## 2024-01-16 NOTE — PROGRESS NOTES
Diabetes Self-Management Education & Support    Presents for: Initial Assessment for new diagnosis (Session 1)    Type of Service: In Person Visit      ASSESSMENT:  Pt is here today with new diagnosis of diabetes.  He had diagnosis of prediabetes prior.  Hx gastric sleeve 4-5 years ago.  Highest weight was 257#.  He would like to lose about 20-25#.  Pt is tolerating Metformin XR as prescribed without side effects.  He is interested in CGM for monitoring.  Pt listened and participated well in session.     Patient's most recent   Lab Results   Component Value Date    A1C 7.2 12/20/2023    A1C 5.8 06/25/2019     is not meeting goal of <7.0    Diabetes knowledge and skills assessment:   Patient is knowledgeable in diabetes management concepts related to: Pt needs review in all areas - new diagnosis.     Education today focused on the following diabetes management concepts: Healthy Eating, Being Active, Monitoring, and Taking Medication    Based on learning assessment above, most appropriate setting for further diabetes education would be: Individual setting.      PLAN  Pt will follow healthy, low carbohydrate meal plan provided - 60-75 grams/meal, 15-30 grams/snack.  Pt will begin walking.  His goal is 30 minutes 3x/week at this time.  Dexcom G7 will be ordered.  Pt is in the process of switching pharmacies so he will call and let us know where to send order.   If unable to obtain via insurance pt willing to consider paying cash for Freestyle Liz 3.     Topics to cover at upcoming visits: Problem Solving, Reducing Risks, and Healthy Coping    Follow-up: Session 2 - 1 month    See Care Plan for co-developed, patient-state behavior change goals.  AVS provided for patient today.    Education Materials Provided:  [employer] - Living Well with Diabetes A Guide for People with Diabetes and their Families  [employer] Meal Planning and Carb Counting  [facility] Planning Healthy Meals  [employer] Reading a Nutrition Facts  "Label  [employer] Low Blood Glucose  [employer] High Blood Glucose  [employer] Managing Diabetes Safely During Sick Days  [employer] Foot Care for People with Diabetes  [employer] Am I at Risk for Stroke?  [employer] How can I Lower my Risk for Cardiovascular Disease?  [employer] [place] [employer] Proper Disposal of Household Medical Wastes  Diabetes Disaster Response Coalition Patient Preparedness Plan       SUBJECTIVE/OBJECTIVE:  Presents for: Initial Assessment for new diagnosis (Session 1)  Accompanied by: Self  Diabetes education in the past 24mo: No  Focus of Visit: Assistance w/ making life changes, Healthy Eating, Being Active, Diabetes Pathophysiology, Monitoring  Diabetes type: Type 2  Date of diagnosis: December2023  Disease course: Other (New diagnosis.)  Diabetes management related comments/concerns: Pt wants to know if he will ever be able to be off of Metformin.  Transportation concerns: No  Difficulty affording diabetes medication?: No  Other concerns:: None  Cultural Influences/Ethnic Background:  Not  or     Diabetes Symptoms & Complications:  Fatigue: Sometimes  Neuropathy: No  Polydipsia: No  Polyphagia: No  Polyuria: No  Visual change: No  Slow healing wounds: No  Symptom course: Stable  Weight trend: Stable  Complications assessed today?: Yes  CVA: No  Heart disease: No  Nephropathy: No    Patient Problem List and Family Medical History reviewed for relevant medical history, current medical status, and diabetes risk factors.    Vitals:  /81   Pulse 69   Resp 16   Ht 1.664 m (5' 5.5\")   Wt 109.3 kg (241 lb)   SpO2 96%   BMI 39.49 kg/m    Estimated body mass index is 39.49 kg/m  as calculated from the following:    Height as of this encounter: 1.664 m (5' 5.5\").    Weight as of this encounter: 109.3 kg (241 lb).   Last 3 BP:   BP Readings from Last 3 Encounters:   01/16/24 138/81   12/20/23 115/70   12/16/22 118/70       History   Smoking Status    Never   Smokeless " "Tobacco    Never       Labs:  Lab Results   Component Value Date    A1C 7.2 12/20/2023    A1C 5.8 06/25/2019     Lab Results   Component Value Date     12/20/2023     10/05/2021    GLC 87 06/25/2019     Lab Results   Component Value Date    LDL 97 12/20/2023    LDL 99 06/25/2019     HDL Cholesterol   Date Value Ref Range Status   06/25/2019 43 >39 mg/dL Final     Direct Measure HDL   Date Value Ref Range Status   12/20/2023 45 >=40 mg/dL Final   ]  GFR Estimate   Date Value Ref Range Status   12/20/2023 >90 >60 mL/min/1.73m2 Final   06/25/2019 >90 >60 mL/min/[1.73_m2] Final     Comment:     Non  GFR Calc  Starting 12/18/2018, serum creatinine based estimated GFR (eGFR) will be   calculated using the Chronic Kidney Disease Epidemiology Collaboration   (CKD-EPI) equation.       GFR Estimate If Black   Date Value Ref Range Status   06/25/2019 >90 >60 mL/min/[1.73_m2] Final     Comment:      GFR Calc  Starting 12/18/2018, serum creatinine based estimated GFR (eGFR) will be   calculated using the Chronic Kidney Disease Epidemiology Collaboration   (CKD-EPI) equation.       Lab Results   Component Value Date    CR 0.77 12/20/2023    CR 0.81 06/25/2019     No results found for: \"MICROALBUMIN\"    Healthy Eating:  Healthy Eating Assessed Today: Yes  Cultural/Nondenominational diet restrictions?: No  Meal planning/habits: Avoiding sweets  How many times a week on average do you eat food made away from home (restaurant/take-out)?: 1  Meals include: Breakfast, Lunch, Dinner  Breakfast: overnight oats or breakfast sandwich on english muffin or Premier protein shake  Lunch: leftovers  Dinner: pasta with meat/bread or meatloaf, mashed potatoes or hotdish or homemade soup with crackers  Snacks: meat/cheese or beef jerky or popcorn or cereal  Other: Pt has cut back on candy much.  Beverages: Diet soda, Water, Alcohol (social drinking - vodka with diet coke)  Has patient met with a dietitian in " the past?: No    Being Active:  Being Active Assessed Today: Yes  Exercise:: Currently not exercising  Barrier to exercise: None    Monitoring:  Monitoring Assessed Today: Yes  Did patient bring glucose meter to appointment? :  (Pt does not have a meter.)    Taking Medications:  Diabetes Medication(s)       Biguanides       metFORMIN (GLUCOPHAGE XR) 500 MG 24 hr tablet Take 1 tablet (500 mg) by mouth at bedtime For a week and then increase to 2 tablets po qhs            Taking Medication Assessed Today: Yes  Current Treatments: Oral Medication (taken by mouth) (Metformin  mg bid)  Problems taking diabetes medications regularly?: No  Diabetes medication side effects?: No    Problem Solving:  Problem Solving Assessed Today: Yes  Is the patient at risk for hypoglycemia?: No  Is the patient at risk for DKA?: No    Reducing Risks:  Reducing Risks Assessed Today: Yes  Diabetes Risks: Age over 45 years  CAD Risks: Diabetes Mellitus, Male sex, Obesity, Sedentary lifestyle  Sees dentist every 6 months?: Yes    Healthy Coping:  Healthy Coping Assessed Today: Yes  Emotional response to diabetes: Acceptance, Concern for health and well-being, Ready to learn  Informal Support system:: Spouse  Stage of change: PREPARATION (Decided to change - considering how)  Patient Activation Measure Survey Score:      6/25/2019     1:00 PM   TIFFANY Score (Last Two)   TIFFANY Raw Score 36   Activation Score 75.5   TIFFANY Level 4       Care Plan and Education Provided:  Care Plan: Diabetes   Updates made by Misty Herrera RD since 1/16/2024 12:00 AM        Problem: HbA1C Not In Goal         Goal: Establish Regular Follow-Ups with PCP         Task: Discuss with PCP the recommended timing for patient's next follow up visit(s)    Responsible User: Misty Herrera RD        Task: Discuss schedule for PCP visits with patient    Responsible User: Misty Herrera RD        Goal: Get HbA1C Level in Goal         Task: Educate patient on diabetes education  self-management topics    Responsible User: Misty Herrera RD        Task: Educate patient on benefits of regular glucose monitoring    Responsible User: Misty Herrera RD        Task: Refer patient to appropriate extended care team member, as needed (Medication Therapy Management, Behavioral Health, Physical Therapy, etc.)    Responsible User: Misty Herrera RD        Task: Discuss diabetes treatment plan with patient    Responsible User: Misty Herrera RD        Problem: Diabetes Self-Management Education Needed to Optimize Self-Care Behaviors         Goal: Understand diabetes pathophysiology and disease progression         Task: Provide education on diabetes pathophysiology and disease progression specfic to patient's diabetes type Completed 1/16/2024   Responsible User: Misty Herrera RD        Goal: Healthy Eating - follow a healthy eating pattern for diabetes         Task: Provide education on portion control and consistency in amount, composition and timing of food intake Completed 1/16/2024   Responsible User: Misty Herrera RD        Task: Provide education on managing carbohydrate intake (carbohydrate counting, plate planning method, etc.) Completed 1/16/2024   Responsible User: Misty Herrera RD   Note:    60-75 grams/meal, 15-30 grams/snack       Task: Provide education on weight management Completed 1/16/2024   Responsible User: Misty Herrera RD        Task: Provide education on heart healthy eating    Responsible User: Misty Herrera RD        Task: Provide education on eating out Completed 1/16/2024   Responsible User: Misty Herrera RD        Task: Develop individualized healthy eating plan with patient Completed 1/16/2024   Responsible User: Misty Herrera RD        Goal: Being Active - get regular physical activity, working up to at least 150 minutes per week    Start Date: 1/16/2024   This Visit's Progress: 0%   Note:    Pt will walk for 30 minutes at least 3x/week.        Task: Provide education  on relationship of activity to glucose and precautions to take if at risk for low glucose Completed 1/16/2024   Responsible User: Misty Herrera RD        Task: Discuss barriers to physical activity with patient Completed 1/16/2024   Responsible User: Misty Herrera RD        Task: Develop physical activity plan with patient Completed 1/16/2024   Responsible User: Misty Herrera RD        Task: Explore community resources including walking groups, assistance programs, and home videos    Responsible User: Misty Herrera RD        Goal: Monitoring - monitor glucose and ketones as directed         Task: Provide education on blood glucose monitoring (purpose, proper technique, frequency, glucose targets, interpreting results, when to use glucose control solution, sharps disposal)    Responsible User: Misty Herrera RD        Task: Provide education on continuous glucose monitoring (sensor placement, use of maty or /reader, understanding glucose trends, alerts and alarms, differences between sensor glucose and blood glucose) Completed 1/16/2024   Responsible User: Misty Herrera RD   Note:    Pt is interested in CGM.  Unsure if insurance will cover.  Dexcom G7 ordered as appears may be covered.  If not, pt willing to pay cash for Liz 3 with e-voucher.        Task: Provide education on ketone monitoring (when to monitor, frequency, etc.)    Responsible User: Misty Herrera RD        Goal: Taking Medication - patient is consistently taking medications as directed         Task: Provide education on action of prescribed medication, including when to take and possible side effects Completed 1/16/2024   Responsible User: Misty Herrera RD        Task: Provide education on insulin and injectable diabetes medications, including administration, storage, site selection and rotation for injection sites    Responsible User: Misty Herrera RD        Task: Discuss barriers to medication adherence with patient and provide  management technique ideas as appropriate    Responsible User: Misty Herrera RD        Task: Provide education on frequency and refill details of medications    Responsible User: Misty Herrera RD        Goal: Problem Solving - know how to prevent and manage short-term diabetes complications         Task: Provide education on high blood glucose - causes, signs/symptoms, prevention and treatment    Responsible User: Misty Herrera RD        Task: Provide education on low blood glucose - causes, signs/symptoms, prevention, treatment, carrying a carbohydrate source at all times, and medical identification    Responsible User: Misty Herrera RD        Task: Provide education on safe travel with diabetes    Responsible User: Misty Herrera RD        Task: Provide education on how to care for diabetes on sick days    Responsible User: Misty Herrera RD        Task: Provide education on when to call a health care provider    Responsible User: Misty Herrera RD        Goal: Reducing Risks - know how to prevent and treat long-term diabetes complications         Task: Provide education on major complications of diabetes, prevention, early diagnostic measures and treatment of complications    Responsible User: Misty Herrera RD        Task: Provide education on recommended care for dental, eye and foot health    Responsible User: Misty Herrera RD        Task: Provide education on Hemoglobin A1c - goals and relationship to blood glucose levels    Responsible User: Misty Herrera RD        Task: Provide education on recommendations for heart health - lipid levels and goals, blood pressure and goals, and aspirin therapy, if indicated    Responsible User: Misty Herrera RD        Task: Provide education on tobacco cessation    Responsible User: Misty Herrera RD        Goal: Healthy Coping - use available resources to cope with the challenges of managing diabetes         Task: Discuss recognizing feelings about having diabetes     Responsible User: Misty Herrera RD        Task: Provide education on the benefits of making appropriate lifestyle changes    Responsible User: Misty Herrera RD        Task: Provide education on benefits of utilizing support systems    Responsible User: Misty Herrera RD        Task: Discuss methods for coping with stress    Responsible User: Misty Herrera RD        Task: Provide education on when to seek professional counseling    Responsible User: Misty Herrera RD          Time Spent: 60 minutes  Encounter Type: Individual    Any diabetes medication dose changes were made via the CDE Protocol per the patient's referring provider. A copy of this encounter was shared with the provider.

## 2024-01-16 NOTE — PATIENT INSTRUCTIONS
-Follow healthy, low carbohydrate meal plan provided - 60-75 grams/meal, 15-30 grams/snack.  -Exercise goal is 150 minutes weekly.   -We will try to order Dexcom G7 for glucose monitoring.  If your insurance does not cover we can consider Freestyle Liz 3.   -Whichever you obtain you will need to download that maty on your phone.    -You can set up on your own if you feel confident - otherwise call and I can help you.   -Target glucose levels prior to meals are .  Two hours after a meal .   -Follow up in one month.  Call sooner with concerns.  -LIBERTAD Castano, [facility] [phone].

## 2024-01-16 NOTE — LETTER
1/16/2024        RE: Jose J PAN Donell  [address] [place] [address]        Diabetes Self-Management Education & Support    Presents for: Initial Assessment for new diagnosis (Session 1)    Type of Service: In Person Visit      ASSESSMENT:  Pt is here today with new diagnosis of diabetes.  He had diagnosis of prediabetes prior.  Hx gastric sleeve 4-5 years ago.  Highest weight was 257#.  He would like to lose about 20-25#.  Pt is tolerating Metformin XR as prescribed without side effects.  He is interested in CGM for monitoring.  Pt listened and participated well in session.     Patient's most recent   Lab Results   Component Value Date    A1C 7.2 12/20/2023    A1C 5.8 06/25/2019     is not meeting goal of <7.0    Diabetes knowledge and skills assessment:   Patient is knowledgeable in diabetes management concepts related to: Pt needs review in all areas - new diagnosis.     Education today focused on the following diabetes management concepts: Healthy Eating, Being Active, Monitoring, and Taking Medication    Based on learning assessment above, most appropriate setting for further diabetes education would be: Individual setting.      PLAN  Pt will follow healthy, low carbohydrate meal plan provided - 60-75 grams/meal, 15-30 grams/snack.  Pt will begin walking.  His goal is 30 minutes 3x/week at this time.  Dexcom G7 will be ordered.  Pt is in the process of switching pharmacies so he will call and let us know where to send order.   If unable to obtain via insurance pt willing to consider paying cash for Freestyle Liz 3.     Topics to cover at upcoming visits: Problem Solving, Reducing Risks, and Healthy Coping    Follow-up: Session 2 - 1 month    See Care Plan for co-developed, patient-state behavior change goals.  AVS provided for patient today.    Education Materials Provided:  [employer] - Living Well with Diabetes A Guide for People with Diabetes and their Families  [employer] Meal Planning and Carb  "Counting  [employer] Planning Healthy Meals  [employer] Reading a Nutrition Facts Label  [employer] Low Blood Glucose  [employer] High Blood Glucose  [employer] Managing Diabetes Safely During Sick Days  [employer] Foot Care for People with Diabetes  [employer] Am I at Risk for Stroke?  [employer] How can I Lower my Risk for Cardiovascular Disease?  [employer] Proper Disposal of Household Medical Wastes  Diabetes Disaster Response Coalition Patient Preparedness Plan       SUBJECTIVE/OBJECTIVE:  Presents for: Initial Assessment for new diagnosis (Session 1)  Accompanied by: Self  Diabetes education in the past 24mo: No  Focus of Visit: Assistance w/ making life changes, Healthy Eating, Being Active, Diabetes Pathophysiology, Monitoring  Diabetes type: Type 2  Date of diagnosis: December2023  Disease course: Other (New diagnosis.)  Diabetes management related comments/concerns: Pt wants to know if he will ever be able to be off of Metformin.  Transportation concerns: No  Difficulty affording diabetes medication?: No  Other concerns:: None  Cultural Influences/Ethnic Background:  Not  or     Diabetes Symptoms & Complications:  Fatigue: Sometimes  Neuropathy: No  Polydipsia: No  Polyphagia: No  Polyuria: No  Visual change: No  Slow healing wounds: No  Symptom course: Stable  Weight trend: Stable  Complications assessed today?: Yes  CVA: No  Heart disease: No  Nephropathy: No    Patient Problem List and Family Medical History reviewed for relevant medical history, current medical status, and diabetes risk factors.    Vitals:  /81   Pulse 69   Resp 16   Ht 1.664 m (5' 5.5\")   Wt 109.3 kg (241 lb)   SpO2 96%   BMI 39.49 kg/m    Estimated body mass index is 39.49 kg/m  as calculated from the following:    Height as of this encounter: 1.664 m (5' 5.5\").    Weight as of this encounter: 109.3 kg (241 lb).   Last 3 BP:   BP Readings from Last 3 Encounters:   01/16/24 138/81   12/20/23 " "115/70   12/16/22 118/70       History   Smoking Status     Never   Smokeless Tobacco     Never       Labs:  Lab Results   Component Value Date    A1C 7.2 12/20/2023    A1C 5.8 06/25/2019     Lab Results   Component Value Date     12/20/2023     10/05/2021    GLC 87 06/25/2019     Lab Results   Component Value Date    LDL 97 12/20/2023    LDL 99 06/25/2019     HDL Cholesterol   Date Value Ref Range Status   06/25/2019 43 >39 mg/dL Final     Direct Measure HDL   Date Value Ref Range Status   12/20/2023 45 >=40 mg/dL Final   ]  GFR Estimate   Date Value Ref Range Status   12/20/2023 >90 >60 mL/min/1.73m2 Final   06/25/2019 >90 >60 mL/min/[1.73_m2] Final     Comment:     Non  GFR Calc  Starting 12/18/2018, serum creatinine based estimated GFR (eGFR) will be   calculated using the Chronic Kidney Disease Epidemiology Collaboration   (CKD-EPI) equation.       GFR Estimate If Black   Date Value Ref Range Status   06/25/2019 >90 >60 mL/min/[1.73_m2] Final     Comment:      GFR Calc  Starting 12/18/2018, serum creatinine based estimated GFR (eGFR) will be   calculated using the Chronic Kidney Disease Epidemiology Collaboration   (CKD-EPI) equation.       Lab Results   Component Value Date    CR 0.77 12/20/2023    CR 0.81 06/25/2019     No results found for: \"MICROALBUMIN\"    Healthy Eating:  Healthy Eating Assessed Today: Yes  Cultural/Judaism diet restrictions?: No  Meal planning/habits: Avoiding sweets  How many times a week on average do you eat food made away from home (restaurant/take-out)?: 1  Meals include: Breakfast, Lunch, Dinner  Breakfast: overnight oats or breakfast sandwich on english muffin or Premier protein shake  Lunch: leftovers  Dinner: pasta with meat/bread or meatloaf, mashed potatoes or hotdish or homemade soup with crackers  Snacks: meat/cheese or beef jerky or popcorn or cereal  Other: Pt has cut back on candy much.  Beverages: Diet soda, Water, Alcohol " (social drinking - vodka with diet coke)  Has patient met with a dietitian in the past?: No    Being Active:  Being Active Assessed Today: Yes  Exercise:: Currently not exercising  Barrier to exercise: None    Monitoring:  Monitoring Assessed Today: Yes  Did patient bring glucose meter to appointment? :  (Pt does not have a meter.)    Taking Medications:  Diabetes Medication(s)       Biguanides       metFORMIN (GLUCOPHAGE XR) 500 MG 24 hr tablet Take 1 tablet (500 mg) by mouth at bedtime For a week and then increase to 2 tablets po qhs            Taking Medication Assessed Today: Yes  Current Treatments: Oral Medication (taken by mouth) (Metformin  mg bid)  Problems taking diabetes medications regularly?: No  Diabetes medication side effects?: No    Problem Solving:  Problem Solving Assessed Today: Yes  Is the patient at risk for hypoglycemia?: No  Is the patient at risk for DKA?: No    Reducing Risks:  Reducing Risks Assessed Today: Yes  Diabetes Risks: Age over 45 years  CAD Risks: Diabetes Mellitus, Male sex, Obesity, Sedentary lifestyle  Sees dentist every 6 months?: Yes    Healthy Coping:  Healthy Coping Assessed Today: Yes  Emotional response to diabetes: Acceptance, Concern for health and well-being, Ready to learn  Informal Support system:: Spouse  Stage of change: PREPARATION (Decided to change - considering how)  Patient Activation Measure Survey Score:      6/25/2019     1:00 PM   TIFFANY Score (Last Two)   TIFFANY Raw Score 36   Activation Score 75.5   TIFFANY Level 4       Care Plan and Education Provided:  Care Plan: Diabetes   Updates made by Misty Herrera RD since 1/16/2024 12:00 AM        Problem: HbA1C Not In Goal         Goal: Establish Regular Follow-Ups with PCP         Task: Discuss with PCP the recommended timing for patient's next follow up visit(s)    Responsible User: Misty Herrera RD        Task: Discuss schedule for PCP visits with patient    Responsible User: Misty Herrera RD        Goal:  Get HbA1C Level in Goal         Task: Educate patient on diabetes education self-management topics    Responsible User: Misty Herrera RD        Task: Educate patient on benefits of regular glucose monitoring    Responsible User: Misty Herrera RD        Task: Refer patient to appropriate extended care team member, as needed (Medication Therapy Management, Behavioral Health, Physical Therapy, etc.)    Responsible User: Misty Herrera RD        Task: Discuss diabetes treatment plan with patient    Responsible User: Misty Herrera RD        Problem: Diabetes Self-Management Education Needed to Optimize Self-Care Behaviors         Goal: Understand diabetes pathophysiology and disease progression         Task: Provide education on diabetes pathophysiology and disease progression specfic to patient's diabetes type Completed 1/16/2024   Responsible User: Misty Herrera RD        Goal: Healthy Eating - follow a healthy eating pattern for diabetes         Task: Provide education on portion control and consistency in amount, composition and timing of food intake Completed 1/16/2024   Responsible User: Misty Herrera RD        Task: Provide education on managing carbohydrate intake (carbohydrate counting, plate planning method, etc.) Completed 1/16/2024   Responsible User: Misty Herrera RD   Note:    60-75 grams/meal, 15-30 grams/snack       Task: Provide education on weight management Completed 1/16/2024   Responsible User: Misty Herrera RD        Task: Provide education on heart healthy eating    Responsible User: Misty Herrera RD        Task: Provide education on eating out Completed 1/16/2024   Responsible User: Misty Herrera RD        Task: Develop individualized healthy eating plan with patient Completed 1/16/2024   Responsible User: Misty Herrera RD        Goal: Being Active - get regular physical activity, working up to at least 150 minutes per week    Start Date: 1/16/2024   This Visit's Progress: 0%   Note:     Pt will walk for 30 minutes at least 3x/week.        Task: Provide education on relationship of activity to glucose and precautions to take if at risk for low glucose Completed 1/16/2024   Responsible User: Misty Herrera RD        Task: Discuss barriers to physical activity with patient Completed 1/16/2024   Responsible User: Misty Herrera RD        Task: Develop physical activity plan with patient Completed 1/16/2024   Responsible User: Misty Herrera RD        Task: Explore community resources including walking groups, assistance programs, and home videos    Responsible User: Misty Herrera RD        Goal: Monitoring - monitor glucose and ketones as directed         Task: Provide education on blood glucose monitoring (purpose, proper technique, frequency, glucose targets, interpreting results, when to use glucose control solution, sharps disposal)    Responsible User: Misty Herrera RD        Task: Provide education on continuous glucose monitoring (sensor placement, use of maty or /reader, understanding glucose trends, alerts and alarms, differences between sensor glucose and blood glucose) Completed 1/16/2024   Responsible User: Misty Herrera RD   Note:    Pt is interested in CGM.  Unsure if insurance will cover.  Dexcom G7 ordered as appears may be covered.  If not, pt willing to pay cash for Liz 3 with e-voucher.        Task: Provide education on ketone monitoring (when to monitor, frequency, etc.)    Responsible User: Misty Herrera RD        Goal: Taking Medication - patient is consistently taking medications as directed         Task: Provide education on action of prescribed medication, including when to take and possible side effects Completed 1/16/2024   Responsible User: Misty Herrera RD        Task: Provide education on insulin and injectable diabetes medications, including administration, storage, site selection and rotation for injection sites    Responsible User: Misty Herrera RD         Task: Discuss barriers to medication adherence with patient and provide management technique ideas as appropriate    Responsible User: Misty Herrera RD        Task: Provide education on frequency and refill details of medications    Responsible User: Misty Herrera RD        Goal: Problem Solving - know how to prevent and manage short-term diabetes complications         Task: Provide education on high blood glucose - causes, signs/symptoms, prevention and treatment    Responsible User: Misty Herrera RD        Task: Provide education on low blood glucose - causes, signs/symptoms, prevention, treatment, carrying a carbohydrate source at all times, and medical identification    Responsible User: Misty Herrera RD        Task: Provide education on safe travel with diabetes    Responsible User: Misty Herrera RD        Task: Provide education on how to care for diabetes on sick days    Responsible User: Misty Herrera RD        Task: Provide education on when to call a health care provider    Responsible User: Misty Herrera RD        Goal: Reducing Risks - know how to prevent and treat long-term diabetes complications         Task: Provide education on major complications of diabetes, prevention, early diagnostic measures and treatment of complications    Responsible User: Misty Herrera RD        Task: Provide education on recommended care for dental, eye and foot health    Responsible User: Misty Herrera RD        Task: Provide education on Hemoglobin A1c - goals and relationship to blood glucose levels    Responsible User: Misty Herrera RD        Task: Provide education on recommendations for heart health - lipid levels and goals, blood pressure and goals, and aspirin therapy, if indicated    Responsible User: Misty Herrera RD        Task: Provide education on tobacco cessation    Responsible User: Misty Herrera RD        Goal: Healthy Coping - use available resources to cope with the challenges of managing  diabetes         Task: Discuss recognizing feelings about having diabetes    Responsible User: Misty Herrera RD        Task: Provide education on the benefits of making appropriate lifestyle changes    Responsible User: Misty Herrera RD        Task: Provide education on benefits of utilizing support systems    Responsible User: Misty Herrera RD        Task: Discuss methods for coping with stress    Responsible User: Misty Herrera RD        Task: Provide education on when to seek professional counseling    Responsible User: Misty Herrera RD          Time Spent: 60 minutes  Encounter Type: Individual    Any diabetes medication dose changes were made via the CDE Protocol per the patient's referring provider. A copy of this encounter was shared with the provider.       Sincerely,        Misty Herrera RD

## 2024-01-17 RX ORDER — METOPROLOL SUCCINATE 100 MG/1
100 TABLET, EXTENDED RELEASE ORAL DAILY
Qty: 90 TABLET | Refills: 3 | Status: SHIPPED | OUTPATIENT
Start: 2024-01-17 | End: 2024-03-25

## 2024-01-17 RX ORDER — METFORMIN HCL 500 MG
500 TABLET, EXTENDED RELEASE 24 HR ORAL AT BEDTIME
Qty: 60 TABLET | Refills: 3 | Status: SHIPPED | OUTPATIENT
Start: 2024-01-17 | End: 2024-03-25

## 2024-01-17 RX ORDER — ATORVASTATIN CALCIUM 10 MG/1
10 TABLET, FILM COATED ORAL DAILY
Qty: 90 TABLET | Refills: 3 | Status: SHIPPED | OUTPATIENT
Start: 2024-01-17 | End: 2024-03-25

## 2024-01-17 RX ORDER — AMLODIPINE BESYLATE 5 MG/1
5 TABLET ORAL DAILY
Qty: 90 TABLET | Refills: 3 | Status: SHIPPED | OUTPATIENT
Start: 2024-01-17 | End: 2024-03-25

## 2024-01-17 RX ORDER — LISINOPRIL 20 MG/1
20 TABLET ORAL 2 TIMES DAILY
Qty: 180 TABLET | Refills: 3 | Status: SHIPPED | OUTPATIENT
Start: 2024-01-17 | End: 2024-03-25

## 2024-01-17 RX ORDER — GEMFIBROZIL 600 MG/1
600 TABLET, FILM COATED ORAL 2 TIMES DAILY
Qty: 180 TABLET | Refills: 3 | Status: SHIPPED | OUTPATIENT
Start: 2024-01-17 | End: 2024-03-25

## 2024-01-17 NOTE — CONFIDENTIAL NOTE
amLODIPine (NORVASC) 5 MG tablet       Last Written Prescription Date:  12/14/22  Last Fill Quantity: 90,   # refills: 3  Last Office Visit: 12/20/23  Future Office visit:    Next 5 appointments (look out 90 days)      Mar 25, 2024 10:30 AM  (Arrive by 10:15 AM)  [facility] [place] [facility] [place] [facility] [address]  [place] [address]  [phone]             Routing refill request to provider for review/approval because:        gemfibrozil (LOPID) 600 MG tablet   Last Written Prescription Date:  12/16/22  Last Fill Quantity: 180,   # refills: 3  Last Office Visit: 12/20/23  Future Office visit:    Next 5 appointments (look out 90 days)      Mar 25, 2024 10:30 AM  (Arrive by 10:15 AM)  SHORT with Jennifer Herbert MD  [facility] [place] ([facility] [place] ) [address]  [place] [address]  [phone]             Routing refill request to provider for review/approval because:        lisinopril (ZESTRIL) 20 MG tablet   Last Written Prescription Date:  12/16/22  Last Fill Quantity: 180,   # refills: 3  Last Office Visit: 12/20/23  Future Office visit:    Next 5 appointments (look out 90 days)      Mar 25, 2024 10:30 AM  (Arrive by 10:15 AM)  SHORT with Jennifer Herbert MD  [facility] [place] ([facility] [place] ) [address]  [place] [address]  [phone]             Routing refill request to provider for review/approval because:        atorvastatin (LIPITOR) 10 MG tablet   Last Written Prescription Date:  12/16/22  Last Fill Quantity: 90,   # refills: 3  Last Office Visit: 12/20/23  Future Office visit:    Next 5 appointments (look out 90 days)      Mar 25, 2024 10:30 AM  (Arrive by 10:15 AM)  SHORT with Jennifer Herbert MD  [facility] [place] ([facility] [place] ) [address]  [place] [address]  [phone]             Routing refill request to  provider for review/approval because:        metFORMIN (GLUCOPHAGE XR) 500 MG 24 hr tablet   Last Written Prescription Date:  12/20/23  Last Fill Quantity: 60,   # refills: 3  Last Office Visit: 12/20/23  Future Office visit:    Next 5 appointments (look out 90 days)      Mar 25, 2024 10:30 AM  (Arrive by 10:15 AM)  [facility] [address]  [place] [address]  [phone]             Routing refill request to provider for review/approval because:      metoprolol succinate ER (TOPROL XL) 100 MG 24 hr tablet       Last Written Prescription Date:  01/03/24  Last Fill Quantity: 90,   # refills: 3  Last Office Visit: 12/20/23  Future Office visit:    Next 5 appointments (look out 90 days)      Mar 25, 2024 10:30 AM  (Arrive by 10:15 AM)  SHORT with Jennifer Herbert MD  [facility] [place] ([facility] [place] ) [address]  [place] [address]  [phone]             Routing refill request to provider for review/approval because:

## 2024-01-18 ENCOUNTER — TELEPHONE (OUTPATIENT)
Dept: FAMILY MEDICINE | Facility: OTHER | Age: 54
End: 2024-01-18

## 2024-01-18 NOTE — TELEPHONE ENCOUNTER
RECEIVED PA REQUEST FROM [employer] FOR Continuous Blood Gluc Sensor (DEXCOM G7 SENSOR) MISC. SUBMITTED ON CMM, WAITING FOR RESPONSE.

## 2024-01-18 NOTE — TELEPHONE ENCOUNTER
Received DENIAL for Continuous Blood Gluc Sensor (DEXCOM G7 SENSOR) from [employer]. Scanned into [employer].

## 2024-02-19 ENCOUNTER — HOSPITAL ENCOUNTER (OUTPATIENT)
Dept: EDUCATION SERVICES | Facility: HOSPITAL | Age: 54
Discharge: HOME | End: 2024-02-19
Attending: DIETITIAN, REGISTERED | Admitting: DIETITIAN, REGISTERED
Payer: COMMERCIAL

## 2024-02-19 VITALS
WEIGHT: 229.9 LBS | SYSTOLIC BLOOD PRESSURE: 145 MMHG | BODY MASS INDEX: 36.95 KG/M2 | HEART RATE: 69 BPM | OXYGEN SATURATION: 98 % | RESPIRATION RATE: 16 BRPM | DIASTOLIC BLOOD PRESSURE: 91 MMHG | HEIGHT: 66 IN

## 2024-02-19 DIAGNOSIS — E11.9 TYPE 2 DIABETES MELLITUS WITHOUT COMPLICATION, WITHOUT LONG-TERM CURRENT USE OF INSULIN (H): Primary | ICD-10-CM

## 2024-02-19 PROCEDURE — G0108 DIAB MANAGE TRN  PER INDIV: HCPCS | Performed by: DIETITIAN, REGISTERED

## 2024-02-19 ASSESSMENT — PAIN SCALES - GENERAL: PAINLEVEL: NO PAIN (0)

## 2024-02-19 NOTE — LETTER
2/19/2024        RE: Jose J Marley  [address] [place] [address]        Diabetes Self-Management Education & Support    Presents for: Initial Assessment for new diagnosis (Session 2)    Type of Service: In Person Visit    ASSESSMENT:  Pt is here today for follow up Session 2.  He reports he has been doing well with meal planning and has essentially stopped eating candy.  Weight is down 12# from initial session approximately one month ago.  Pt has also started walking 3x/week for 30 minutes for exercise.  Dexcom G7 was denied by his insurance.  Pt would like to wait to see what A1c is at visit with provider scheduled at the end of March.  He is willing to revisit glucose monitoring at that time if A1c does not improve.  Foot exam and eye exam are due - discussed.  BP high on two checks today.     Patient's most recent   Lab Results   Component Value Date    A1C 7.2 12/20/2023    A1C 5.8 06/25/2019     is not meeting goal of <7.0    Diabetes knowledge and skills assessment:   Patient is knowledgeable in diabetes management concepts related to: Problem Solving  Outcomes:  Problem Solving:Pt is able to list three potential symptoms of hypoglycemia, Pt is able to list treatment guidelines for hypoglycemia, Pt is able to state treatment guidelines for sick day management and when to seek medical care, Pt knows where to find guidelines for disaster preparedness, and Pt knows where to find strategies for dealing with travel with diabetes    Continue education with the following diabetes management concepts: Reducing Risks and Healthy Coping    Based on learning assessment above, most appropriate setting for further diabetes education would be: Individual setting.      PLAN  Pt will continue to work on healthy food choices, weight loss.  Pt will work to increase walking to 30 minutes 5x/week.  Schedule eye exam.  Foot exam and A1c at upcoming provider visit.   BP recheck scheduled.     Follow-up: Session  "3    See Care Plan for co-developed, patient-state behavior change goals.  AVS provided for patient today.    Education Materials Provided:  No new materials today - used Krames book.     SUBJECTIVE/OBJECTIVE:  Presents for: Initial Assessment for new diagnosis (Session 2)  Accompanied by: Self  Diabetes education in the past 24mo: Yes  Focus of Visit: Assistance w/ making life changes, Being Active, Monitoring, Problem Solving  Diabetes type: Type 2  Date of diagnosis: December2023  How confident are you filling out medical forms by yourself:: Extremely  Diabetes management related comments/concerns: Pt would like to hold off on glucose monitoring until next A1c check since Dexcom G7 was denied by his insurance.  Transportation concerns: No  Difficulty affording diabetes medication?: No  Other concerns:: None  Cultural Influences/Ethnic Background:  Not  or     Diabetes Symptoms & Complications:  Diabetes Related Symptoms: None  Weight trend: Decreasing (Weight is down 12# since inital session one month ago.)  Symptom course: Stable  Complications assessed today?: Yes  CVA: No  Heart disease: No  Nephropathy: No    Patient Problem List and Family Medical History reviewed for relevant medical history, current medical status, and diabetes risk factors.    Vitals:  /91   Pulse 69   Resp 16   Ht 1.664 m (5' 5.5\")   Wt 104.3 kg (229 lb 14.4 oz)   SpO2 98%   BMI 37.68 kg/m    Estimated body mass index is 37.68 kg/m  as calculated from the following:    Height as of this encounter: 1.664 m (5' 5.5\").    Weight as of this encounter: 104.3 kg (229 lb 14.4 oz).   Last 3 BP:   BP Readings from Last 3 Encounters:   02/19/24 145/91   01/16/24 138/81   12/20/23 115/70       History   Smoking Status     Never   Smokeless Tobacco     Never       Labs:  Lab Results   Component Value Date    A1C 7.2 12/20/2023    A1C 5.8 06/25/2019     Lab Results   Component Value Date     12/20/2023     " "10/05/2021    GLC 87 06/25/2019     Lab Results   Component Value Date    LDL 97 12/20/2023    LDL 99 06/25/2019     HDL Cholesterol   Date Value Ref Range Status   06/25/2019 43 >39 mg/dL Final     Direct Measure HDL   Date Value Ref Range Status   12/20/2023 45 >=40 mg/dL Final   ]  GFR Estimate   Date Value Ref Range Status   12/20/2023 >90 >60 mL/min/1.73m2 Final   06/25/2019 >90 >60 mL/min/[1.73_m2] Final     Comment:     Non  GFR Calc  Starting 12/18/2018, serum creatinine based estimated GFR (eGFR) will be   calculated using the Chronic Kidney Disease Epidemiology Collaboration   (CKD-EPI) equation.       GFR Estimate If Black   Date Value Ref Range Status   06/25/2019 >90 >60 mL/min/[1.73_m2] Final     Comment:      GFR Calc  Starting 12/18/2018, serum creatinine based estimated GFR (eGFR) will be   calculated using the Chronic Kidney Disease Epidemiology Collaboration   (CKD-EPI) equation.       Lab Results   Component Value Date    CR 0.77 12/20/2023    CR 0.81 06/25/2019     No results found for: \"MICROALBUMIN\"    Healthy Eating:  Healthy Eating Assessed Today: Yes  Cultural/Hindu diet restrictions?: No  Do you have any food allergies or intolerances?: No  Meal planning/habits: Avoiding sweets  Who cooks/prepares meals for you?: Self  Who purchases food in  your home?: Self, Spouse  How many times a week on average do you eat food made away from home (restaurant/take-out)?: 1  Meals include: Breakfast, Lunch, Dinner  Breakfast: Crack n Egg bowl or overnight oats  Lunch: leftovers  Dinner: salad with protein with blue cheese or spaghetti using noodles with less carbohydrates or chicken with veggies/potato (small portion) or hamburger (no bun) or omelets  Snacks: meat/cheese/crackers portion control or jerky or popcorn  Other: Pt previously at much candy.  He notes he is basically not eating candy now - occasional sugar free lifesaver or low sugar ice cream " bar.  Beverages: Diet soda, Water, Alcohol (social drinking - vodka with diet coke)  Has patient met with a dietitian in the past?: Yes    Being Active:  Being Active Assessed Today: Yes  Exercise:: Yes (walking)  Days per week of moderate to strenuous exercise (like a brisk walk): 3  On average, minutes per day of exercise at this level: 30  How intense was your typical exercise? : Moderate (like brisk walking)  Exercise Minutes per Week: 90  Barrier to exercise: None    Monitoring:  Monitoring Assessed Today: Yes  Did patient bring glucose meter to appointment? :  (Pt does not have a meter.)    Taking Medications:  Diabetes Medication(s)       Biguanides       metFORMIN (GLUCOPHAGE XR) 500 MG 24 hr tablet Take 1 tablet (500 mg) by mouth at bedtime For a week and then increase to 2 tablets po qhs            Taking Medication Assessed Today: Yes  Current Treatments: Oral Medication (taken by mouth) (Metformin XR 1000 mg evening)  Problems taking diabetes medications regularly?: No  Diabetes medication side effects?: No    Problem Solving:  Problem Solving Assessed Today: Yes  Is the patient at risk for hypoglycemia?: No  Is the patient at risk for DKA?: No  Does patient have severe weather/disaster plan for diabetes management?: Yes  Does patient have sick day plan for diabetes management?: Yes    Reducing Risks:  Reducing Risks Assessed Today: Yes  Diabetes Risks: Age over 45 years  CAD Risks: Diabetes Mellitus, Male sex, Obesity, Sedentary lifestyle  Has dilated eye exam at least once a year?: No  Sees dentist every 6 months?: Yes  Feet checked by healthcare provider in the last year?: No    Healthy Coping:  Healthy Coping Assessed Today: Yes  Emotional response to diabetes: Acceptance, Concern for health and well-being, Ready to learn  Informal Support system:: Spouse  Stage of change: ACTION (Actively working towards change)  Patient Activation Measure Survey Score:      6/25/2019     1:00 PM   TIFFANY Score (Last  Two)   TIFFANY Raw Score 36   Activation Score 75.5   TIFFANY Level 4         Care Plan and Education Provided:  Care Plan: Diabetes   Updates made by Misty Herrera RD since 2/19/2024 12:00 AM        Problem: Diabetes Self-Management Education Needed to Optimize Self-Care Behaviors         Goal: Being Active - get regular physical activity, working up to at least 150 minutes per week    Start Date: 1/16/2024   This Visit's Progress: 100%   Recent Progress: 0%   Note:    Pt will walk for 30 minutes at least 3x/week.        Goal: Monitoring - monitor glucose and ketones as directed         Task: Provide education on blood glucose monitoring (purpose, proper technique, frequency, glucose targets, interpreting results, when to use glucose control solution, sharps disposal) Completed 2/19/2024   Responsible User: Misty Herrera RD   Note:    Pt declines glucose monitoring at this time.  Will revisit pending upcoming A1c if indicated.        Task: Provide education on ketone monitoring (when to monitor, frequency, etc.) Completed 2/19/2024   Responsible User: Misty Herrera RD        Goal: Taking Medication - patient is consistently taking medications as directed         Task: Provide education on insulin and injectable diabetes medications, including administration, storage, site selection and rotation for injection sites Completed 2/19/2024   Responsible User: Misty Herrera RD   Note:    Pt is not taking insulin or injectable medications.        Task: Discuss barriers to medication adherence with patient and provide management technique ideas as appropriate Completed 2/19/2024   Responsible User: Misty Herrera RD   Note:    No barriers present.       Task: Provide education on frequency and refill details of medications Completed 2/19/2024   Responsible User: Misty Herrera RD        Goal: Problem Solving - know how to prevent and manage short-term diabetes complications         Task: Provide education on high blood glucose  - causes, signs/symptoms, prevention and treatment Completed 2/19/2024   Responsible User: Misty Herrera RD        Task: Provide education on low blood glucose - causes, signs/symptoms, prevention, treatment, carrying a carbohydrate source at all times, and medical identification Completed 2/19/2024   Responsible User: Misty Herrera RD        Task: Provide education on safe travel with diabetes Completed 2/19/2024   Responsible User: Misty Herrera RD        Task: Provide education on how to care for diabetes on sick days Completed 2/19/2024   Responsible User: Misty Herrera RD        Task: Provide education on when to call a health care provider Completed 2/19/2024   Responsible User: Misty Herrera RD          Time Spent: 40 minutes  Encounter Type: Individual    Any diabetes medication dose changes were made via the CDE Protocol per the patient's referring provider. A copy of this encounter was shared with the provider.       Sincerely,        Misty Herrera RD

## 2024-02-19 NOTE — PATIENT INSTRUCTIONS
-Continue to work on healthy food choices - 60 grams carbohydrate/meal, 15-30 grams/snack.  -Your exercise is great!  Continue to work towards 30 minutes 5x/week.   -Weight today was 229# - down 12# in the past month.  -Foot exam is due when you see Dr. Herbert.  -Schedule eye exam when you can.   -Follow up late March/early April.  -LIBERTAD Castano, [facility]– [place] [phone].

## 2024-02-19 NOTE — PROGRESS NOTES
Diabetes Self-Management Education & Support    Presents for: Initial Assessment for new diagnosis (Session 2)    Type of Service: In Person Visit    ASSESSMENT:  Pt is here today for follow up Session 2.  He reports he has been doing well with meal planning and has essentially stopped eating candy.  Weight is down 12# from initial session approximately one month ago.  Pt has also started walking 3x/week for 30 minutes for exercise.  Dexcom G7 was denied by his insurance.  Pt would like to wait to see what A1c is at visit with provider scheduled at the end of March.  He is willing to revisit glucose monitoring at that time if A1c does not improve.  Foot exam and eye exam are due - discussed.  BP high on two checks today.     Patient's most recent   Lab Results   Component Value Date    A1C 7.2 12/20/2023    A1C 5.8 06/25/2019     is not meeting goal of <7.0    Diabetes knowledge and skills assessment:   Patient is knowledgeable in diabetes management concepts related to: Problem Solving  Outcomes:  Problem Solving:Pt is able to list three potential symptoms of hypoglycemia, Pt is able to list treatment guidelines for hypoglycemia, Pt is able to state treatment guidelines for sick day management and when to seek medical care, Pt knows where to find guidelines for disaster preparedness, and Pt knows where to find strategies for dealing with travel with diabetes    Continue education with the following diabetes management concepts: Reducing Risks and Healthy Coping    Based on learning assessment above, most appropriate setting for further diabetes education would be: Individual setting.      PLAN  Pt will continue to work on healthy food choices, weight loss.  Pt will work to increase walking to 30 minutes 5x/week.  Schedule eye exam.  Foot exam and A1c at upcoming provider visit.   BP recheck scheduled.     Follow-up: Session 3    See Care Plan for co-developed, patient-state behavior change goals.  AVS provided for  "patient today.    Education Materials Provided:  No new materials today - used Krames book.     SUBJECTIVE/OBJECTIVE:  Presents for: Initial Assessment for new diagnosis (Session 2)  Accompanied by: Self  Diabetes education in the past 24mo: Yes  Focus of Visit: Assistance w/ making life changes, Being Active, Monitoring, Problem Solving  Diabetes type: Type 2  Date of diagnosis: December2023  How confident are you filling out medical forms by yourself:: Extremely  Diabetes management related comments/concerns: Pt would like to hold off on glucose monitoring until next A1c check since Dexcom G7 was denied by his insurance.  Transportation concerns: No  Difficulty affording diabetes medication?: No  Other concerns:: None  Cultural Influences/Ethnic Background:  Not  or     Diabetes Symptoms & Complications:  Diabetes Related Symptoms: None  Weight trend: Decreasing (Weight is down 12# since inital session one month ago.)  Symptom course: Stable  Complications assessed today?: Yes  CVA: No  Heart disease: No  Nephropathy: No    Patient Problem List and Family Medical History reviewed for relevant medical history, current medical status, and diabetes risk factors.    Vitals:  /91   Pulse 69   Resp 16   Ht 1.664 m (5' 5.5\")   Wt 104.3 kg (229 lb 14.4 oz)   SpO2 98%   BMI 37.68 kg/m    Estimated body mass index is 37.68 kg/m  as calculated from the following:    Height as of this encounter: 1.664 m (5' 5.5\").    Weight as of this encounter: 104.3 kg (229 lb 14.4 oz).   Last 3 BP:   BP Readings from Last 3 Encounters:   02/19/24 145/91   01/16/24 138/81   12/20/23 115/70       History   Smoking Status    Never   Smokeless Tobacco    Never       Labs:  Lab Results   Component Value Date    A1C 7.2 12/20/2023    A1C 5.8 06/25/2019     Lab Results   Component Value Date     12/20/2023     10/05/2021    GLC 87 06/25/2019     Lab Results   Component Value Date    LDL 97 12/20/2023    LDL " "99 06/25/2019     HDL Cholesterol   Date Value Ref Range Status   06/25/2019 43 >39 mg/dL Final     Direct Measure HDL   Date Value Ref Range Status   12/20/2023 45 >=40 mg/dL Final   ]  GFR Estimate   Date Value Ref Range Status   12/20/2023 >90 >60 mL/min/1.73m2 Final   06/25/2019 >90 >60 mL/min/[1.73_m2] Final     Comment:     Non  GFR Calc  Starting 12/18/2018, serum creatinine based estimated GFR (eGFR) will be   calculated using the Chronic Kidney Disease Epidemiology Collaboration   (CKD-EPI) equation.       GFR Estimate If Black   Date Value Ref Range Status   06/25/2019 >90 >60 mL/min/[1.73_m2] Final     Comment:      GFR Calc  Starting 12/18/2018, serum creatinine based estimated GFR (eGFR) will be   calculated using the Chronic Kidney Disease Epidemiology Collaboration   (CKD-EPI) equation.       Lab Results   Component Value Date    CR 0.77 12/20/2023    CR 0.81 06/25/2019     No results found for: \"MICROALBUMIN\"    Healthy Eating:  Healthy Eating Assessed Today: Yes  Cultural/Islam diet restrictions?: No  Do you have any food allergies or intolerances?: No  Meal planning/habits: Avoiding sweets  Who cooks/prepares meals for you?: Self  Who purchases food in  your home?: Self, Spouse  How many times a week on average do you eat food made away from home (restaurant/take-out)?: 1  Meals include: Breakfast, Lunch, Dinner  Breakfast: Crack n Egg bowl or overnight oats  Lunch: leftovers  Dinner: salad with protein with blue cheese or spaghetti using noodles with less carbohydrates or chicken with veggies/potato (small portion) or hamburger (no bun) or omelets  Snacks: meat/cheese/crackers portion control or jerky or popcorn  Other: Pt previously at much candy.  He notes he is basically not eating candy now - occasional sugar free lifesaver or low sugar ice cream bar.  Beverages: Diet soda, Water, Alcohol (social drinking - vodka with diet coke)  Has patient met with a " dietitian in the past?: Yes    Being Active:  Being Active Assessed Today: Yes  Exercise:: Yes (walking)  Days per week of moderate to strenuous exercise (like a brisk walk): 3  On average, minutes per day of exercise at this level: 30  How intense was your typical exercise? : Moderate (like brisk walking)  Exercise Minutes per Week: 90  Barrier to exercise: None    Monitoring:  Monitoring Assessed Today: Yes  Did patient bring glucose meter to appointment? :  (Pt does not have a meter.)    Taking Medications:  Diabetes Medication(s)       Biguanides       metFORMIN (GLUCOPHAGE XR) 500 MG 24 hr tablet Take 1 tablet (500 mg) by mouth at bedtime For a week and then increase to 2 tablets po qhs            Taking Medication Assessed Today: Yes  Current Treatments: Oral Medication (taken by mouth) (Metformin XR 1000 mg evening)  Problems taking diabetes medications regularly?: No  Diabetes medication side effects?: No    Problem Solving:  Problem Solving Assessed Today: Yes  Is the patient at risk for hypoglycemia?: No  Is the patient at risk for DKA?: No  Does patient have severe weather/disaster plan for diabetes management?: Yes  Does patient have sick day plan for diabetes management?: Yes    Reducing Risks:  Reducing Risks Assessed Today: Yes  Diabetes Risks: Age over 45 years  CAD Risks: Diabetes Mellitus, Male sex, Obesity, Sedentary lifestyle  Has dilated eye exam at least once a year?: No  Sees dentist every 6 months?: Yes  Feet checked by healthcare provider in the last year?: No    Healthy Coping:  Healthy Coping Assessed Today: Yes  Emotional response to diabetes: Acceptance, Concern for health and well-being, Ready to learn  Informal Support system:: Spouse  Stage of change: ACTION (Actively working towards change)  Patient Activation Measure Survey Score:      6/25/2019     1:00 PM   TIFFANY Score (Last Two)   TIFFANY Raw Score 36   Activation Score 75.5   TIFFANY Level 4         Care Plan and Education Provided:  Care  Plan: Diabetes   Updates made by Misty Herrera RD since 2/19/2024 12:00 AM        Problem: Diabetes Self-Management Education Needed to Optimize Self-Care Behaviors         Goal: Being Active - get regular physical activity, working up to at least 150 minutes per week    Start Date: 1/16/2024   This Visit's Progress: 100%   Recent Progress: 0%   Note:    Pt will walk for 30 minutes at least 3x/week.        Goal: Monitoring - monitor glucose and ketones as directed         Task: Provide education on blood glucose monitoring (purpose, proper technique, frequency, glucose targets, interpreting results, when to use glucose control solution, sharps disposal) Completed 2/19/2024   Responsible User: Misty Herrera RD   Note:    Pt declines glucose monitoring at this time.  Will revisit pending upcoming A1c if indicated.        Task: Provide education on ketone monitoring (when to monitor, frequency, etc.) Completed 2/19/2024   Responsible User: Misty Herrera RD        Goal: Taking Medication - patient is consistently taking medications as directed         Task: Provide education on insulin and injectable diabetes medications, including administration, storage, site selection and rotation for injection sites Completed 2/19/2024   Responsible User: Misty Herrera RD   Note:    Pt is not taking insulin or injectable medications.        Task: Discuss barriers to medication adherence with patient and provide management technique ideas as appropriate Completed 2/19/2024   Responsible User: Misty Herrera RD   Note:    No barriers present.       Task: Provide education on frequency and refill details of medications Completed 2/19/2024   Responsible User: Misty Herrera RD        Goal: Problem Solving - know how to prevent and manage short-term diabetes complications         Task: Provide education on high blood glucose - causes, signs/symptoms, prevention and treatment Completed 2/19/2024   Responsible User: Misty Herrera RD         Task: Provide education on low blood glucose - causes, signs/symptoms, prevention, treatment, carrying a carbohydrate source at all times, and medical identification Completed 2/19/2024   Responsible User: Misty Herrera RD        Task: Provide education on safe travel with diabetes Completed 2/19/2024   Responsible User: Misty Herrera RD        Task: Provide education on how to care for diabetes on sick days Completed 2/19/2024   Responsible User: Misty Herrera RD        Task: Provide education on when to call a health care provider Completed 2/19/2024   Responsible User: Misty Herrera RD          Time Spent: 40 minutes  Encounter Type: Individual    Any diabetes medication dose changes were made via the CDE Protocol per the patient's referring provider. A copy of this encounter was shared with the provider.

## 2024-02-26 ENCOUNTER — ALLIED HEALTH/NURSE VISIT (OUTPATIENT)
Dept: FAMILY MEDICINE | Facility: OTHER | Age: 54
End: 2024-02-26
Attending: NURSE PRACTITIONER
Payer: COMMERCIAL

## 2024-02-26 VITALS — DIASTOLIC BLOOD PRESSURE: 76 MMHG | SYSTOLIC BLOOD PRESSURE: 138 MMHG

## 2024-02-26 DIAGNOSIS — I10 ESSENTIAL HYPERTENSION: Primary | ICD-10-CM

## 2024-02-26 PROCEDURE — 99207 PR NO CHARGE NURSE ONLY: CPT

## 2024-03-11 ENCOUNTER — TELEPHONE (OUTPATIENT)
Dept: PEDIATRICS | Facility: OTHER | Age: 54
End: 2024-03-11

## 2024-03-11 DIAGNOSIS — J02.9 PHARYNGITIS, UNSPECIFIED ETIOLOGY: Primary | ICD-10-CM

## 2024-03-11 RX ORDER — AZITHROMYCIN 250 MG/1
TABLET, FILM COATED ORAL
Qty: 6 TABLET | Refills: 0 | Status: SHIPPED | OUTPATIENT
Start: 2024-03-11 | End: 2024-03-25

## 2024-03-25 ENCOUNTER — HOSPITAL ENCOUNTER (OUTPATIENT)
Dept: EDUCATION SERVICES | Facility: HOSPITAL | Age: 54
Discharge: HOME | End: 2024-03-25
Attending: NURSE PRACTITIONER
Payer: COMMERCIAL

## 2024-03-25 ENCOUNTER — OFFICE VISIT (OUTPATIENT)
Dept: FAMILY MEDICINE | Facility: OTHER | Age: 54
End: 2024-03-25
Attending: FAMILY MEDICINE
Payer: COMMERCIAL

## 2024-03-25 ENCOUNTER — LAB (OUTPATIENT)
Dept: LAB | Facility: OTHER | Age: 54
End: 2024-03-25
Payer: COMMERCIAL

## 2024-03-25 VITALS
BODY MASS INDEX: 36.91 KG/M2 | SYSTOLIC BLOOD PRESSURE: 122 MMHG | HEART RATE: 65 BPM | OXYGEN SATURATION: 98 % | DIASTOLIC BLOOD PRESSURE: 72 MMHG | TEMPERATURE: 98 F | WEIGHT: 225.2 LBS

## 2024-03-25 VITALS
WEIGHT: 225.2 LBS | OXYGEN SATURATION: 98 % | HEIGHT: 66 IN | HEART RATE: 65 BPM | BODY MASS INDEX: 36.19 KG/M2 | SYSTOLIC BLOOD PRESSURE: 122 MMHG | DIASTOLIC BLOOD PRESSURE: 72 MMHG

## 2024-03-25 DIAGNOSIS — I10 ESSENTIAL HYPERTENSION: ICD-10-CM

## 2024-03-25 DIAGNOSIS — E11.9 TYPE 2 DIABETES MELLITUS WITHOUT COMPLICATION, WITHOUT LONG-TERM CURRENT USE OF INSULIN (H): Primary | ICD-10-CM

## 2024-03-25 DIAGNOSIS — E11.65 TYPE 2 DIABETES MELLITUS WITH HYPERGLYCEMIA, WITHOUT LONG-TERM CURRENT USE OF INSULIN (H): Primary | ICD-10-CM

## 2024-03-25 DIAGNOSIS — E11.65 TYPE 2 DIABETES MELLITUS WITH HYPERGLYCEMIA, WITHOUT LONG-TERM CURRENT USE OF INSULIN (H): ICD-10-CM

## 2024-03-25 DIAGNOSIS — E78.00 PURE HYPERCHOLESTEROLEMIA: ICD-10-CM

## 2024-03-25 LAB
CREAT UR-MCNC: 208.5 MG/DL
EST. AVERAGE GLUCOSE BLD GHB EST-MCNC: 123 MG/DL
HBA1C MFR BLD: 5.9 %
MICROALBUMIN UR-MCNC: 104.2 MG/L
MICROALBUMIN/CREAT UR: 49.98 MG/G CR (ref 0–17)

## 2024-03-25 PROCEDURE — 83036 HEMOGLOBIN GLYCOSYLATED A1C: CPT

## 2024-03-25 PROCEDURE — 36415 COLL VENOUS BLD VENIPUNCTURE: CPT

## 2024-03-25 PROCEDURE — 99207 PR FOOT EXAM NO CHARGE: CPT | Performed by: FAMILY MEDICINE

## 2024-03-25 PROCEDURE — 82570 ASSAY OF URINE CREATININE: CPT

## 2024-03-25 PROCEDURE — G0108 DIAB MANAGE TRN  PER INDIV: HCPCS | Performed by: DIETITIAN, REGISTERED

## 2024-03-25 PROCEDURE — 82043 UR ALBUMIN QUANTITATIVE: CPT

## 2024-03-25 PROCEDURE — 99214 OFFICE O/P EST MOD 30 MIN: CPT | Performed by: FAMILY MEDICINE

## 2024-03-25 RX ORDER — AMLODIPINE BESYLATE 5 MG/1
5 TABLET ORAL DAILY
Qty: 90 TABLET | Refills: 3 | Status: SHIPPED | OUTPATIENT
Start: 2024-03-25

## 2024-03-25 RX ORDER — LISINOPRIL 20 MG/1
20 TABLET ORAL 2 TIMES DAILY
Qty: 180 TABLET | Refills: 3 | Status: SHIPPED | OUTPATIENT
Start: 2024-03-25

## 2024-03-25 RX ORDER — METFORMIN HCL 500 MG
1000 TABLET, EXTENDED RELEASE 24 HR ORAL AT BEDTIME
Qty: 180 TABLET | Refills: 2 | Status: SHIPPED | OUTPATIENT
Start: 2024-03-25

## 2024-03-25 RX ORDER — GEMFIBROZIL 600 MG/1
600 TABLET, FILM COATED ORAL 2 TIMES DAILY
Qty: 180 TABLET | Refills: 3 | Status: SHIPPED | OUTPATIENT
Start: 2024-03-25

## 2024-03-25 RX ORDER — METOPROLOL SUCCINATE 100 MG/1
100 TABLET, EXTENDED RELEASE ORAL DAILY
Qty: 90 TABLET | Refills: 3 | Status: SHIPPED | OUTPATIENT
Start: 2024-03-25

## 2024-03-25 RX ORDER — ATORVASTATIN CALCIUM 10 MG/1
10 TABLET, FILM COATED ORAL DAILY
Qty: 90 TABLET | Refills: 3 | Status: SHIPPED | OUTPATIENT
Start: 2024-03-25

## 2024-03-25 ASSESSMENT — PAIN SCALES - GENERAL
PAINLEVEL: NO PAIN (0)
PAINLEVEL: NO PAIN (0)

## 2024-03-25 NOTE — PROGRESS NOTES
"  Assessment & Plan     Type 2 diabetes mellitus with hyperglycemia, without long-term current use of insulin (H)  Doing really well, A1c down to 5.9  He has cut out sweets, down 15 lbs  Follow-up 4-5 mos  - Hemoglobin A1c; Future  - Albumin Random Urine Quantitative with Creat Ratio; Future  - metFORMIN (GLUCOPHAGE XR) 500 MG 24 hr tablet; Take 2 tablets (1,000 mg) by mouth at bedtime  - SD FOOT EXAM NO CHARGE    Essential hypertension  Doing well, no hypotensive symptoms, discussed paying attention to that, as with his weight loss and diet changes may need to/get to cut back on htn meds  - metoprolol succinate ER (TOPROL XL) 100 MG 24 hr tablet; Take 1 tablet (100 mg) by mouth daily  - lisinopril (ZESTRIL) 20 MG tablet; Take 1 tablet (20 mg) by mouth 2 times daily  - gemfibrozil (LOPID) 600 MG tablet; Take 1 tablet (600 mg) by mouth 2 times daily  - amLODIPine (NORVASC) 5 MG tablet; Take 1 tablet (5 mg) by mouth daily    Pure hypercholesterolemia  Continue daily  - atorvastatin (LIPITOR) 10 MG tablet; Take 1 tablet (10 mg) by mouth daily    BMI  Estimated body mass index is 36.91 kg/m  as calculated from the following:    Height as of 2/19/24: 1.664 m (5' 5.5\").    Weight as of this encounter: 102.2 kg (225 lb 3.2 oz).   Weight management plan: Discussed healthy diet and exercise guidelines    Patient was agreeable to this plan and had no further questions.  There are no Patient Instructions on file for this visit.    No follow-ups on file.    Philip House is a 54 year old, presenting for the following health issues:  Diabetes, Lipids, and Hypertension        3/25/2024    10:19 AM   Additional Questions   Roomed by Bruno Quiroz   Accompanied by Self         3/25/2024    10:19 AM   Patient Reported Additional Medications   Patient reports taking the following new medications none     HPI     Diabetes Follow-up    How often are you checking your blood sugar? Not at all  What concerns do you have today about " your diabetes? None   Do you have any of these symptoms? (Select all that apply)  No numbness or tingling in feet.  No redness, sores or blisters on feet.  No complaints of excessive thirst.  No reports of blurry vision.  No significant changes to weight.  Have you had a diabetic eye exam in the last 12 months? No            Hyperlipidemia Follow-Up    Are you regularly taking any medication or supplement to lower your cholesterol?   Yes- Lipitor   Are you having muscle aches or other side effects that you think could be caused by your cholesterol lowering medication?  No    Hypertension Follow-up    Do you check your blood pressure regularly outside of the clinic? No   Are you following a low salt diet? Yes  Are your blood pressures ever more than 140 on the top number (systolic) OR more   than 90 on the bottom number (diastolic), for example 140/90? Yes    BP Readings from Last 2 Encounters:   03/25/24 122/72   02/26/24 138/76     Hemoglobin A1C (%)   Date Value   12/20/2023 7.2 (H)   12/16/2022 6.3 (H)   06/25/2019 5.8 (H)   01/23/2018 6.5 (H)     LDL Cholesterol Calculated (mg/dL)   Date Value   12/20/2023 97   12/16/2022 101 (H)   06/25/2019 99   01/23/2018 68     How many servings of fruits and vegetables do you eat daily?  0-1  On average, how many sweetened beverages do you drink each day (Examples: soda, juice, sweet tea, etc.  Do NOT count diet or artificially sweetened beverages)?   0  How many days per week do you exercise enough to make your heart beat faster? 4  How many minutes a day do you exercise enough to make your heart beat faster? 20 - 29  How many days per week do you miss taking your medication? 0      Review of Systems  Constitutional, neuro, ENT, endocrine, pulmonary, cardiac, gastrointestinal, genitourinary, musculoskeletal, integument and psychiatric systems are negative, except as otherwise noted.      Objective    /72 (BP Location: Right arm, Patient Position: Sitting, Cuff Size:  Adult Large)   Pulse 65   Temp 98  F (36.7  C) (Tympanic)   Wt 102.2 kg (225 lb 3.2 oz)   SpO2 98%   BMI 36.91 kg/m    Body mass index is 36.91 kg/m .  Physical Exam   GENERAL: alert and no distress  NECK: no adenopathy, no asymmetry, masses, or scars  RESP: lungs clear to auscultation - no rales, rhonchi or wheezes  CV: regular rate and rhythm, normal S1 S2, no S3 or S4, no murmur, click or rub, no peripheral edema  ABDOMEN: soft, nontender, no hepatosplenomegaly, no masses and bowel sounds normal  MS: no gross musculoskeletal defects noted, no edema  PSYCH: mentation appears normal, affect normal/bright  Diabetic foot exam: normal DP and PT pulses, no trophic changes or ulcerative lesions, normal sensory exam, and normal monofilament exam    Results for orders placed or performed in visit on 03/25/24   Hemoglobin A1c     Status: Abnormal   Result Value Ref Range    Estimated Average Glucose 123 mg/dL    Hemoglobin A1C 5.9 (H) <5.7 %   Albumin Random Urine Quantitative with Creat Ratio     Status: Abnormal   Result Value Ref Range    Creatinine Urine mg/dL 208.5 mg/dL    Albumin Urine mg/L 104.2 mg/L    Albumin Urine mg/g Cr 49.98 (H) 0.00 - 17.00 mg/g Cr           Signed Electronically by: Jennifer Herbert MD

## 2024-03-25 NOTE — PROGRESS NOTES
Diabetes Self-Management Education & Support    Presents for: Initial Assessment for new diagnosis (Session 3)    Type of Service: In Person Visit    ASSESSMENT:  A1c today was 5.9% which is in target at down from 7.2% at diagnosis.  Weight is down a total of 16# since diagnosis of diabetes.  Pt is pleased and would like to weigh closer to 200#.  Discussion notes he is doing very well with meal planning and exercise.  Foot exam completed by provider today and pt agrees to schedule eye exam.  He listens and participates well in sessions.  Overall, he has done very well.      Patient's most recent   Lab Results   Component Value Date    A1C 5.9 03/25/2024    A1C 5.8 06/25/2019     is meeting goal of <7.0    Diabetes knowledge and skills assessment:   Patient is knowledgeable in diabetes management concepts related to: Reducing Risks and Healthy Coping    Outcomes:  Healthy Coping:Pt is able to state the effects of life stress/depression on diabetes, Pt is able to list one emotional support person they have in their life, and Pt is able to state options that are available should they experience diabetes distress  Reducing Risks:Pt is able to state long term complications that may occur with diabetes, Pt is able to list two preventative foot care practices, Pt is able to identify frequency of health monitoring/screenings, and Pt is able to state signs and symptoms of heart attach and stroke     Will continue to educate on diabetes management concepts as indicated.     Based on learning assessment above, most appropriate setting for further diabetes education would be: Individual setting.      PLAN  Pt will continue to work on healthy, low carbohydrate food choices, exercise and weight loss.   A1c planned with provider in 4 months.   Pt agrees to schedule eye exam.     Follow-up: Annually or sooner if indicated.      See Care Plan for co-developed, patient-state behavior change goals.  AVS provided for patient  "today.    Education Materials Provided:  Utilized materials provided in Session 1     SUBJECTIVE/OBJECTIVE:  Presents for: Initial Assessment for new diagnosis (Session 3)  Accompanied by: Self  Diabetes education in the past 24mo: Yes  Focus of Visit: Assistance w/ making life changes, Reducing Risks, Healthy Coping  Diabetes type: Type 2  Date of diagnosis: December 2023  Disease course: Improving  How confident are you filling out medical forms by yourself:: Extremely  Diabetes management related comments/concerns: No concerns.  Transportation concerns: No  Difficulty affording diabetes medication?: No  Difficulty affording diabetes testing supplies?:  (Pt does not test.)  Other concerns:: None  Cultural Influences/Ethnic Background:  Not  or     Diabetes Symptoms & Complications:  Diabetes Related Symptoms: None  Weight trend: Decreasing (Weight is down 4# since last visit to [facility], down 16# from initial visit.)  Symptom course: Stable  Disease course: Improving  Complications assessed today?: Yes  CVA: No  Heart disease: No  Nephropathy: No    Patient Problem List and Family Medical History reviewed for relevant medical history, current medical status, and diabetes risk factors.    Vitals:  /72   Pulse 65   Ht 1.664 m (5' 5.5\")   Wt 102.2 kg (225 lb 3.2 oz)   SpO2 98%   BMI 36.91 kg/m    Estimated body mass index is 36.91 kg/m  as calculated from the following:    Height as of this encounter: 1.664 m (5' 5.5\").    Weight as of this encounter: 102.2 kg (225 lb 3.2 oz).   Last 3 BP:   BP Readings from Last 3 Encounters:   03/25/24 122/72   03/25/24 122/72   02/26/24 138/76       History   Smoking Status    Never   Smokeless Tobacco    Never       Labs:  Lab Results   Component Value Date    A1C 5.9 03/25/2024    A1C 5.8 06/25/2019     Lab Results   Component Value Date     12/20/2023     10/05/2021    GLC 87 06/25/2019     Lab Results   Component Value Date    LDL 97 " "12/20/2023    LDL 99 06/25/2019     HDL Cholesterol   Date Value Ref Range Status   06/25/2019 43 >39 mg/dL Final     Direct Measure HDL   Date Value Ref Range Status   12/20/2023 45 >=40 mg/dL Final   ]  GFR Estimate   Date Value Ref Range Status   12/20/2023 >90 >60 mL/min/1.73m2 Final   06/25/2019 >90 >60 mL/min/[1.73_m2] Final     Comment:     Non  GFR Calc  Starting 12/18/2018, serum creatinine based estimated GFR (eGFR) will be   calculated using the Chronic Kidney Disease Epidemiology Collaboration   (CKD-EPI) equation.       GFR Estimate If Black   Date Value Ref Range Status   06/25/2019 >90 >60 mL/min/[1.73_m2] Final     Comment:      GFR Calc  Starting 12/18/2018, serum creatinine based estimated GFR (eGFR) will be   calculated using the Chronic Kidney Disease Epidemiology Collaboration   (CKD-EPI) equation.       Lab Results   Component Value Date    CR 0.77 12/20/2023    CR 0.81 06/25/2019     No results found for: \"MICROALBUMIN\"    Healthy Eating:  Healthy Eating Assessed Today: Yes  Cultural/Scientology diet restrictions?: No  Do you have any food allergies or intolerances?: No  Meal planning/habits: Avoiding sweets, Low carb  Who cooks/prepares meals for you?: Self  Who purchases food in  your home?: Self, Spouse  How many times a week on average do you eat food made away from home (restaurant/take-out)?: 1  Meals include: Breakfast, Lunch, Dinner  Breakfast: Crack n Egg bowl or overnight oats or english muffin with peanut butter or Premier protein shake or yogurt  Lunch: leftovers  Dinner: salad with protein with blue cheese or spaghetti using noodles with less carbohydrates or chicken with veggies/potato (small portion) or hamburger (no bun) or omelets  Snacks: meat/cheese/crackers portion control or jerky or popcorn  Other: Pt previously at much candy.  He notes he is basically not eating candy now - occasional sugar free lifesaver or low sugar ice cream " bar.  Beverages: Diet soda, Water, Alcohol (social drinking - vodka with diet coke)  Has patient met with a dietitian in the past?: Yes    Being Active:  Being Active Assessed Today: Yes  Exercise:: Yes (walking)  Days per week of moderate to strenuous exercise (like a brisk walk): 4  On average, minutes per day of exercise at this level: 30  How intense was your typical exercise? : Moderate (like brisk walking)  Exercise Minutes per Week: 120  Barrier to exercise: None    Monitoring:  Monitoring Assessed Today: Yes  Did patient bring glucose meter to appointment? :  (Pt does not have a meter.)    Taking Medications:  Diabetes Medication(s)       Biguanides       metFORMIN (GLUCOPHAGE XR) 500 MG 24 hr tablet Take 2 tablets (1,000 mg) by mouth at bedtime          Taking Medication Assessed Today: Yes  Current Treatments: Oral Medication (taken by mouth) (Metformin XR 1000 mg evening)  Problems taking diabetes medications regularly?: No  Diabetes medication side effects?: No    Problem Solving:  Problem Solving Assessed Today: Yes  Is the patient at risk for hypoglycemia?: No  Is the patient at risk for DKA?: No  Does patient have severe weather/disaster plan for diabetes management?: Yes  Does patient have sick day plan for diabetes management?: Yes    Reducing Risks:  Reducing Risks Assessed Today: Yes  Diabetes Risks: Age over 45 years  CAD Risks: Diabetes Mellitus, Male sex, Obesity, Sedentary lifestyle  Has dilated eye exam at least once a year?: No (Discussed 3/25/24 - pt will schedule)  Sees dentist every 6 months?: Yes  Feet checked by healthcare provider in the last year?: Yes    Healthy Coping:  Healthy Coping Assessed Today: Yes  Emotional response to diabetes: Acceptance, Concern for health and well-being, Ready to learn  Informal Support system:: Spouse  Stage of change: ACTION (Actively working towards change)  Patient Activation Measure Survey Score:      6/25/2019     1:00 PM   TIFFANY Score (Last Two)    TIFFANY Raw Score 36   Activation Score 75.5   TIFFANY Level 4       Care Plan and Education Provided:  Care Plan: Diabetes   Updates made by Misty Herrera RD since 3/25/2024 12:00 AM        Problem: Diabetes Self-Management Education Needed to Optimize Self-Care Behaviors         Goal: Being Active - get regular physical activity, working up to at least 150 minutes per week    Start Date: 1/16/2024   This Visit's Progress: 100%   Recent Progress: 100%   Note:    Pt will walk for 30 minutes at least 3x/week.        Goal: Reducing Risks - know how to prevent and treat long-term diabetes complications         Task: Provide education on major complications of diabetes, prevention, early diagnostic measures and treatment of complications Completed 3/25/2024   Responsible User: iMsty Herrera RD        Task: Provide education on recommended care for dental, eye and foot health Completed 3/25/2024   Responsible User: Misty Herrera RD        Task: Provide education on Hemoglobin A1c - goals and relationship to blood glucose levels Completed 3/25/2024   Responsible User: Misty Herrera RD        Task: Provide education on recommendations for heart health - lipid levels and goals, blood pressure and goals, and aspirin therapy, if indicated Completed 3/25/2024   Responsible User: Misty Herrera RD        Task: Provide education on tobacco cessation Completed 3/25/2024   Responsible User: Misty Herrera RD   Note:    Pt does not use tobacco.        Goal: Healthy Coping - use available resources to cope with the challenges of managing diabetes         Task: Discuss recognizing feelings about having diabetes Completed 3/25/2024   Responsible User: Misty Herrera RD        Task: Provide education on the benefits of making appropriate lifestyle changes Completed 3/25/2024   Responsible User: Misty Herrera RD        Task: Provide education on benefits of utilizing support systems Completed 3/25/2024   Responsible User: Misty Herrera  RD        Task: Discuss methods for coping with stress Completed 3/25/2024   Responsible User: Misty Herrera RD        Task: Provide education on when to seek professional counseling Completed 3/25/2024   Responsible User: Misty Herrera RD          Time Spent: 40 minutes  Encounter Type: Individual    Any diabetes medication dose changes were made via the CDE Protocol per the patient's referring provider. A copy of this encounter was shared with the provider.

## 2024-03-25 NOTE — PATIENT INSTRUCTIONS
-Continue work on healthy food choices and weight loss.  -You are doing great!    -Weight today was 225# - down 16# since diagnosis!  -A1c today was 5.9% - down from 7.2% at diagnosis.  Great job!  Goal is < 7.0%.  -Schedule you eye exam when able.  -Follow up in diabetes center annually.  -Call with any questions or concerns - LIBERTAD Castano, [facility]– [place] [phone].

## 2024-03-25 NOTE — LETTER
3/25/2024        RE: Jose J PAN Ryrene  [address] [place] [address]        Diabetes Self-Management Education & Support    Presents for: Initial Assessment for new diagnosis (Session 3)    Type of Service: In Person Visit    ASSESSMENT:  A1c today was 5.9% which is in target at down from 7.2% at diagnosis.  Weight is down a total of 16# since diagnosis of diabetes.  Pt is pleased and would like to weigh closer to 200#.  Discussion notes he is doing very well with meal planning and exercise.  Foot exam completed by provider today and pt agrees to schedule eye exam.  He listens and participates well in sessions.  Overall, he has done very well.      Patient's most recent   Lab Results   Component Value Date    A1C 5.9 03/25/2024    A1C 5.8 06/25/2019     is meeting goal of <7.0    Diabetes knowledge and skills assessment:   Patient is knowledgeable in diabetes management concepts related to: Reducing Risks and Healthy Coping    Outcomes:  Healthy Coping:Pt is able to state the effects of life stress/depression on diabetes, Pt is able to list one emotional support person they have in their life, and Pt is able to state options that are available should they experience diabetes distress  Reducing Risks:Pt is able to state long term complications that may occur with diabetes, Pt is able to list two preventative foot care practices, Pt is able to identify frequency of health monitoring/screenings, and Pt is able to state signs and symptoms of heart attach and stroke     Will continue to educate on diabetes management concepts as indicated.     Based on learning assessment above, most appropriate setting for further diabetes education would be: Individual setting.      PLAN  Pt will continue to work on healthy, low carbohydrate food choices, exercise and weight loss.   A1c planned with provider in 4 months.   Pt agrees to schedule eye exam.     Follow-up: Annually or sooner if indicated.      See Care Plan  "for co-developed, patient-state behavior change goals.  AVS provided for patient today.    Education Materials Provided:  Utilized materials provided in Session 1     SUBJECTIVE/OBJECTIVE:  Presents for: Initial Assessment for new diagnosis (Session 3)  Accompanied by: Self  Diabetes education in the past 24mo: Yes  Focus of Visit: Assistance w/ making life changes, Reducing Risks, Healthy Coping  Diabetes type: Type 2  Date of diagnosis: December 2023  Disease course: Improving  How confident are you filling out medical forms by yourself:: Extremely  Diabetes management related comments/concerns: No concerns.  Transportation concerns: No  Difficulty affording diabetes medication?: No  Difficulty affording diabetes testing supplies?:  (Pt does not test.)  Other concerns:: None  Cultural Influences/Ethnic Background:  Not  or     Diabetes Symptoms & Complications:  Diabetes Related Symptoms: None  Weight trend: Decreasing (Weight is down 4# since last visit to [facility], down 16# from initial visit.)  Symptom course: Stable  Disease course: Improving  Complications assessed today?: Yes  CVA: No  Heart disease: No  Nephropathy: No    Patient Problem List and Family Medical History reviewed for relevant medical history, current medical status, and diabetes risk factors.    Vitals:  /72   Pulse 65   Ht 1.664 m (5' 5.5\")   Wt 102.2 kg (225 lb 3.2 oz)   SpO2 98%   BMI 36.91 kg/m    Estimated body mass index is 36.91 kg/m  as calculated from the following:    Height as of this encounter: 1.664 m (5' 5.5\").    Weight as of this encounter: 102.2 kg (225 lb 3.2 oz).   Last 3 BP:   BP Readings from Last 3 Encounters:   03/25/24 122/72   03/25/24 122/72   02/26/24 138/76       History   Smoking Status     Never   Smokeless Tobacco     Never       Labs:  Lab Results   Component Value Date    A1C 5.9 03/25/2024    A1C 5.8 06/25/2019     Lab Results   Component Value Date     12/20/2023     " "10/05/2021    GLC 87 06/25/2019     Lab Results   Component Value Date    LDL 97 12/20/2023    LDL 99 06/25/2019     HDL Cholesterol   Date Value Ref Range Status   06/25/2019 43 >39 mg/dL Final     Direct Measure HDL   Date Value Ref Range Status   12/20/2023 45 >=40 mg/dL Final   ]  GFR Estimate   Date Value Ref Range Status   12/20/2023 >90 >60 mL/min/1.73m2 Final   06/25/2019 >90 >60 mL/min/[1.73_m2] Final     Comment:     Non  GFR Calc  Starting 12/18/2018, serum creatinine based estimated GFR (eGFR) will be   calculated using the Chronic Kidney Disease Epidemiology Collaboration   (CKD-EPI) equation.       GFR Estimate If Black   Date Value Ref Range Status   06/25/2019 >90 >60 mL/min/[1.73_m2] Final     Comment:      GFR Calc  Starting 12/18/2018, serum creatinine based estimated GFR (eGFR) will be   calculated using the Chronic Kidney Disease Epidemiology Collaboration   (CKD-EPI) equation.       Lab Results   Component Value Date    CR 0.77 12/20/2023    CR 0.81 06/25/2019     No results found for: \"MICROALBUMIN\"    Healthy Eating:  Healthy Eating Assessed Today: Yes  Cultural/Presybeterian diet restrictions?: No  Do you have any food allergies or intolerances?: No  Meal planning/habits: Avoiding sweets, Low carb  Who cooks/prepares meals for you?: Self  Who purchases food in  your home?: Self, Spouse  How many times a week on average do you eat food made away from home (restaurant/take-out)?: 1  Meals include: Breakfast, Lunch, Dinner  Breakfast: Crack n Egg bowl or overnight oats or english muffin with peanut butter or Premier protein shake or yogurt  Lunch: leftovers  Dinner: salad with protein with blue cheese or spaghetti using noodles with less carbohydrates or chicken with veggies/potato (small portion) or hamburger (no bun) or omelets  Snacks: meat/cheese/crackers portion control or jerky or popcorn  Other: Pt previously at much candy.  He notes he is basically not eating " candy now - occasional sugar free lifesaver or low sugar ice cream bar.  Beverages: Diet soda, Water, Alcohol (social drinking - vodka with diet coke)  Has patient met with a dietitian in the past?: Yes    Being Active:  Being Active Assessed Today: Yes  Exercise:: Yes (walking)  Days per week of moderate to strenuous exercise (like a brisk walk): 4  On average, minutes per day of exercise at this level: 30  How intense was your typical exercise? : Moderate (like brisk walking)  Exercise Minutes per Week: 120  Barrier to exercise: None    Monitoring:  Monitoring Assessed Today: Yes  Did patient bring glucose meter to appointment? :  (Pt does not have a meter.)    Taking Medications:  Diabetes Medication(s)       Biguanides       metFORMIN (GLUCOPHAGE XR) 500 MG 24 hr tablet Take 2 tablets (1,000 mg) by mouth at bedtime          Taking Medication Assessed Today: Yes  Current Treatments: Oral Medication (taken by mouth) (Metformin XR 1000 mg evening)  Problems taking diabetes medications regularly?: No  Diabetes medication side effects?: No    Problem Solving:  Problem Solving Assessed Today: Yes  Is the patient at risk for hypoglycemia?: No  Is the patient at risk for DKA?: No  Does patient have severe weather/disaster plan for diabetes management?: Yes  Does patient have sick day plan for diabetes management?: Yes    Reducing Risks:  Reducing Risks Assessed Today: Yes  Diabetes Risks: Age over 45 years  CAD Risks: Diabetes Mellitus, Male sex, Obesity, Sedentary lifestyle  Has dilated eye exam at least once a year?: No (Discussed 3/25/24 - pt will schedule)  Sees dentist every 6 months?: Yes  Feet checked by healthcare provider in the last year?: Yes    Healthy Coping:  Healthy Coping Assessed Today: Yes  Emotional response to diabetes: Acceptance, Concern for health and well-being, Ready to learn  Informal Support system:: Spouse  Stage of change: ACTION (Actively working towards change)  Patient Activation Measure  Survey Score:      6/25/2019     1:00 PM   TIFFANY Score (Last Two)   TIFFANY Raw Score 36   Activation Score 75.5   TIFFANY Level 4       Care Plan and Education Provided:  Care Plan: Diabetes   Updates made by Misty Herrera RD since 3/25/2024 12:00 AM        Problem: Diabetes Self-Management Education Needed to Optimize Self-Care Behaviors         Goal: Being Active - get regular physical activity, working up to at least 150 minutes per week    Start Date: 1/16/2024   This Visit's Progress: 100%   Recent Progress: 100%   Note:    Pt will walk for 30 minutes at least 3x/week.        Goal: Reducing Risks - know how to prevent and treat long-term diabetes complications         Task: Provide education on major complications of diabetes, prevention, early diagnostic measures and treatment of complications Completed 3/25/2024   Responsible User: Misty Herrera RD        Task: Provide education on recommended care for dental, eye and foot health Completed 3/25/2024   Responsible User: Misty Herrera RD        Task: Provide education on Hemoglobin A1c - goals and relationship to blood glucose levels Completed 3/25/2024   Responsible User: Misty Herrera RD        Task: Provide education on recommendations for heart health - lipid levels and goals, blood pressure and goals, and aspirin therapy, if indicated Completed 3/25/2024   Responsible User: Misty Herrera RD        Task: Provide education on tobacco cessation Completed 3/25/2024   Responsible User: Misty Herrera RD   Note:    Pt does not use tobacco.        Goal: Healthy Coping - use available resources to cope with the challenges of managing diabetes         Task: Discuss recognizing feelings about having diabetes Completed 3/25/2024   Responsible User: Misty Herrera RD        Task: Provide education on the benefits of making appropriate lifestyle changes Completed 3/25/2024   Responsible User: Misty Herrera RD        Task: Provide education on benefits of utilizing support  systems Completed 3/25/2024   Responsible User: Misty Herrera RD        Task: Discuss methods for coping with stress Completed 3/25/2024   Responsible User: Misty Herrera RD        Task: Provide education on when to seek professional counseling Completed 3/25/2024   Responsible User: Misty Herrera RD          Time Spent: 40 minutes  Encounter Type: Individual    Any diabetes medication dose changes were made via the CDE Protocol per the patient's referring provider. A copy of this encounter was shared with the provider.       Sincerely,        Misty Herrera RD

## 2024-06-05 ENCOUNTER — TRANSFERRED RECORDS (OUTPATIENT)
Dept: MULTI SPECIALTY CLINIC | Facility: CLINIC | Age: 54
End: 2024-06-05

## 2024-06-05 LAB — RETINOPATHY: NORMAL

## 2024-07-06 ENCOUNTER — HEALTH MAINTENANCE LETTER (OUTPATIENT)
Age: 54
End: 2024-07-06

## 2024-07-29 ENCOUNTER — LAB (OUTPATIENT)
Dept: LAB | Facility: OTHER | Age: 54
End: 2024-07-29
Attending: FAMILY MEDICINE
Payer: COMMERCIAL

## 2024-07-29 ENCOUNTER — OFFICE VISIT (OUTPATIENT)
Dept: FAMILY MEDICINE | Facility: OTHER | Age: 54
End: 2024-07-29
Attending: FAMILY MEDICINE
Payer: COMMERCIAL

## 2024-07-29 ENCOUNTER — TELEPHONE (OUTPATIENT)
Dept: FAMILY MEDICINE | Facility: OTHER | Age: 54
End: 2024-07-29

## 2024-07-29 VITALS
TEMPERATURE: 97.3 F | HEART RATE: 64 BPM | OXYGEN SATURATION: 96 % | DIASTOLIC BLOOD PRESSURE: 72 MMHG | HEIGHT: 66 IN | RESPIRATION RATE: 18 BRPM | SYSTOLIC BLOOD PRESSURE: 128 MMHG | BODY MASS INDEX: 36.64 KG/M2 | WEIGHT: 228 LBS

## 2024-07-29 DIAGNOSIS — I10 ESSENTIAL HYPERTENSION: ICD-10-CM

## 2024-07-29 DIAGNOSIS — E11.65 TYPE 2 DIABETES MELLITUS WITH HYPERGLYCEMIA, WITHOUT LONG-TERM CURRENT USE OF INSULIN (H): ICD-10-CM

## 2024-07-29 DIAGNOSIS — E78.2 MIXED HYPERLIPIDEMIA: ICD-10-CM

## 2024-07-29 DIAGNOSIS — E11.65 TYPE 2 DIABETES MELLITUS WITH HYPERGLYCEMIA, WITHOUT LONG-TERM CURRENT USE OF INSULIN (H): Primary | ICD-10-CM

## 2024-07-29 LAB
EST. AVERAGE GLUCOSE BLD GHB EST-MCNC: 128 MG/DL
HBA1C MFR BLD: 6.1 %

## 2024-07-29 PROCEDURE — G2211 COMPLEX E/M VISIT ADD ON: HCPCS | Performed by: FAMILY MEDICINE

## 2024-07-29 PROCEDURE — 36415 COLL VENOUS BLD VENIPUNCTURE: CPT

## 2024-07-29 PROCEDURE — 99214 OFFICE O/P EST MOD 30 MIN: CPT | Performed by: FAMILY MEDICINE

## 2024-07-29 PROCEDURE — 83036 HEMOGLOBIN GLYCOSYLATED A1C: CPT

## 2024-07-29 ASSESSMENT — PAIN SCALES - GENERAL: PAINLEVEL: NO PAIN (0)

## 2024-07-29 NOTE — TELEPHONE ENCOUNTER
MARIPOSA signed and faxed to [facility] faxed to [phone] for exam notes for Dr. Jennifer Herbert.

## 2024-07-29 NOTE — PATIENT INSTRUCTIONS
The Glucose Revolution  by Vianney Ramirez  (glucose goddess)  The Obesity Code by Dr Eleazar Martinez  (web -- you tube)  Fast Like a Girl  by Dr Chyna Izaguirre  (youtube)

## 2024-07-29 NOTE — PROGRESS NOTES
Assessment & Plan     Type 2 diabetes mellitus with hyperglycemia, without long-term current use of insulin (H)  Discussed lifestyle changes he could make  Follow-up 5 mos with CPE  Need eye exam report from michael martinez   Continue metformin  - Hemoglobin A1c; Future    Mixed hyperlipidemia  The 10-year ASCVD risk score (Aureliano WHITE, et al., 2019) is: 8.1%    Values used to calculate the score:      Age: 54 years      Sex: Male      Is Non- : No      Diabetic: Yes      Tobacco smoker: No      Systolic Blood Pressure: 128 mmHg      Is BP treated: Yes      HDL Cholesterol: 45 mg/dL      Total Cholesterol: 165 mg/dL  Continue with statin    Essential hypertension  Stable, improved, continue same meds    Patient was agreeable to this plan and had no further questions.  Patient Instructions   The Glucose Revolution  by Vianney Ramirez  (glucose goddess)  The Obesity Code by Dr Eleazar Martinez  (web -- you tube)  Fast Like a Girl  by Dr Chyna Izaguirre  (youtube)      No follow-ups on file.    Philip House is a 54 year old, presenting for the following health issues:  Hypertension, Diabetes, and Lipids        7/29/2024     9:24 AM   Additional Questions   Roomed by April   Accompanied by self         7/29/2024     9:24 AM   Patient Reported Additional Medications   Patient reports taking the following new medications none     Via the Health Maintenance questionnaire, the patient has reported the following services have been completed -Eye Exam: [facility] 2024-06-05, this information has been sent to the abstraction team.  HPI     Diabetes Follow-up    How often are you checking your blood sugar? Not at all  What concerns do you have today about your diabetes? None   Do you have any of these symptoms? (Select all that apply)  No numbness or tingling in feet.  No redness, sores or blisters on feet.  No complaints of excessive thirst.  No reports of blurry vision.  No significant  "changes to weight.          Hyperlipidemia Follow-Up    Are you regularly taking any medication or supplement to lower your cholesterol?   Yes- lipitor   Are you having muscle aches or other side effects that you think could be caused by your cholesterol lowering medication?  No    Hypertension Follow-up    Do you check your blood pressure regularly outside of the clinic? No   Are you following a low salt diet? Yes  Are your blood pressures ever more than 140 on the top number (systolic) OR more   than 90 on the bottom number (diastolic), for example 140/90? No    BP Readings from Last 2 Encounters:   07/29/24 128/72   03/25/24 122/72     Hemoglobin A1C (%)   Date Value   03/25/2024 5.9 (H)   12/20/2023 7.2 (H)   06/25/2019 5.8 (H)   01/23/2018 6.5 (H)     LDL Cholesterol Calculated (mg/dL)   Date Value   12/20/2023 97   12/16/2022 101 (H)   06/25/2019 99   01/23/2018 68         Review of Systems  Constitutional, neuro, ENT, endocrine, pulmonary, cardiac, gastrointestinal, genitourinary, musculoskeletal, integument and psychiatric systems are negative, except as otherwise noted.      Objective    /72 (BP Location: Left arm, Patient Position: Sitting, Cuff Size: Adult Regular)   Pulse 64   Temp 97.3  F (36.3  C) (Tympanic)   Resp 18   Ht 1.664 m (5' 5.5\")   Wt 103.4 kg (228 lb)   SpO2 96%   BMI 37.36 kg/m    Body mass index is 37.36 kg/m .  Physical Exam   GENERAL: alert and no distress  NECK: no adenopathy, no asymmetry, masses, or scars  RESP: lungs clear to auscultation - no rales, rhonchi or wheezes  CV: regular rate and rhythm, normal S1 S2, no S3 or S4, no murmur, click or rub, no peripheral edema  ABDOMEN: soft, nontender, no hepatosplenomegaly, no masses and bowel sounds normal  MS: no gross musculoskeletal defects noted, no edema  PSYCH: mentation appears normal, affect normal/bright    Results for orders placed or performed in visit on 07/29/24   Hemoglobin A1c     Status: Abnormal   Result " Value Ref Range    Estimated Average Glucose 128 mg/dL    Hemoglobin A1C 6.1 (H) <5.7 %           Signed Electronically by: Jennifer Herbert MD      The longitudinal plan of care for the diagnosis(es)/condition(s) as documented were addressed during this visit. Due to the added complexity in care, I will continue to support Harsh in the subsequent management and with ongoing continuity of care.

## 2024-10-29 ENCOUNTER — DOCUMENTATION ONLY (OUTPATIENT)
Dept: FAMILY MEDICINE | Facility: OTHER | Age: 54
End: 2024-10-29

## 2024-10-29 DIAGNOSIS — G47.33 OSA (OBSTRUCTIVE SLEEP APNEA): Primary | ICD-10-CM

## 2024-11-23 ENCOUNTER — HEALTH MAINTENANCE LETTER (OUTPATIENT)
Age: 54
End: 2024-11-23

## 2024-11-26 DIAGNOSIS — Z12.11 COLON CANCER SCREENING: ICD-10-CM

## 2024-12-10 ENCOUNTER — ORDERS ONLY (AUTO-RELEASED) (OUTPATIENT)
Dept: ADMISSION | Facility: CLINIC | Age: 54
End: 2024-12-10

## 2024-12-10 DIAGNOSIS — Z12.11 COLON CANCER SCREENING: ICD-10-CM

## 2024-12-26 ENCOUNTER — TELEPHONE (OUTPATIENT)
Dept: FAMILY MEDICINE | Facility: OTHER | Age: 54
End: 2024-12-26

## 2024-12-26 DIAGNOSIS — E78.2 MIXED HYPERLIPIDEMIA: ICD-10-CM

## 2024-12-26 DIAGNOSIS — E11.65 TYPE 2 DIABETES MELLITUS WITH HYPERGLYCEMIA, WITHOUT LONG-TERM CURRENT USE OF INSULIN (H): Primary | ICD-10-CM

## 2024-12-26 NOTE — TELEPHONE ENCOUNTER
Labs signed   No adenopathy or splenomegaly. No cervical or inguinal lymphadenopathy. No cervical lymphadenopathy.

## 2024-12-26 NOTE — TELEPHONE ENCOUNTER
Care Team 2   /   Dr. Jennifer Herbert    Please place fasting lab orders as patient will be in tomorrow (12/27/25) A.M. for labs    Thank you

## 2024-12-27 ENCOUNTER — TELEPHONE (OUTPATIENT)
Dept: FAMILY MEDICINE | Facility: OTHER | Age: 54
End: 2024-12-27

## 2024-12-27 NOTE — TELEPHONE ENCOUNTER
Spoke with patient his appointment on 12/27 was cancelled and he is wanting physical done before the end of the year as his insurance- [employer] wont give him the earnings for the year if it is past 01/01/25. Please advise.

## 2024-12-27 NOTE — TELEPHONE ENCOUNTER
9:44 AM    Reason for Call: OVERBOOK    Patient is having the following symptoms: Christofer's physical on 12/27 was cancelled and patient needs it to be done by the end of the year. Please call him back to schedule his physical.     The patient is requesting an appointment for R/S physical by end of year with Dr. Herbert.    Was an appointment offered for this call? Yes  If yes : Appointment type: 3/22 was too far out, he wants by end of year 2024              Date    Preferred method for responding to this message: Telephone Call  What is your phone number ?  [phone]     If we cannot reach you directly, may we leave a detailed response at the number you provided? Yes    Can this message wait until your PCP/provider returns, if unavailable today? No    Мария Pearl

## 2024-12-30 ENCOUNTER — OFFICE VISIT (OUTPATIENT)
Dept: FAMILY MEDICINE | Facility: OTHER | Age: 54
End: 2024-12-30
Attending: FAMILY MEDICINE
Payer: COMMERCIAL

## 2024-12-30 ENCOUNTER — LAB (OUTPATIENT)
Dept: LAB | Facility: OTHER | Age: 54
End: 2024-12-30
Payer: COMMERCIAL

## 2024-12-30 VITALS
BODY MASS INDEX: 35.49 KG/M2 | HEART RATE: 74 BPM | WEIGHT: 234.2 LBS | OXYGEN SATURATION: 98 % | SYSTOLIC BLOOD PRESSURE: 120 MMHG | DIASTOLIC BLOOD PRESSURE: 82 MMHG | HEIGHT: 68 IN | TEMPERATURE: 97.5 F

## 2024-12-30 DIAGNOSIS — E78.2 MIXED HYPERLIPIDEMIA: ICD-10-CM

## 2024-12-30 DIAGNOSIS — E11.65 TYPE 2 DIABETES MELLITUS WITH HYPERGLYCEMIA, WITHOUT LONG-TERM CURRENT USE OF INSULIN (H): ICD-10-CM

## 2024-12-30 DIAGNOSIS — Z00.00 ROUTINE GENERAL MEDICAL EXAMINATION AT A HEALTH CARE FACILITY: Primary | ICD-10-CM

## 2024-12-30 DIAGNOSIS — I10 ESSENTIAL HYPERTENSION: ICD-10-CM

## 2024-12-30 LAB
ALBUMIN SERPL BCG-MCNC: 4.7 G/DL (ref 3.5–5.2)
ALP SERPL-CCNC: 94 U/L (ref 40–150)
ALT SERPL W P-5'-P-CCNC: 23 U/L (ref 0–70)
ANION GAP SERPL CALCULATED.3IONS-SCNC: 14 MMOL/L (ref 7–15)
AST SERPL W P-5'-P-CCNC: 19 U/L (ref 0–45)
BILIRUB SERPL-MCNC: 0.3 MG/DL
BUN SERPL-MCNC: 13.8 MG/DL (ref 6–20)
CALCIUM SERPL-MCNC: 9.8 MG/DL (ref 8.8–10.4)
CHLORIDE SERPL-SCNC: 103 MMOL/L (ref 98–107)
CHOLEST SERPL-MCNC: 185 MG/DL
CREAT SERPL-MCNC: 0.75 MG/DL (ref 0.67–1.17)
EGFRCR SERPLBLD CKD-EPI 2021: >90 ML/MIN/1.73M2
EST. AVERAGE GLUCOSE BLD GHB EST-MCNC: 126 MG/DL
FASTING STATUS PATIENT QL REPORTED: YES
FASTING STATUS PATIENT QL REPORTED: YES
GLUCOSE SERPL-MCNC: 123 MG/DL (ref 70–99)
HBA1C MFR BLD: 6 %
HCO3 SERPL-SCNC: 24 MMOL/L (ref 22–29)
HDLC SERPL-MCNC: 38 MG/DL
LDLC SERPL CALC-MCNC: 105 MG/DL
NONHDLC SERPL-MCNC: 147 MG/DL
POTASSIUM SERPL-SCNC: 4.2 MMOL/L (ref 3.4–5.3)
PROT SERPL-MCNC: 8.1 G/DL (ref 6.4–8.3)
SODIUM SERPL-SCNC: 141 MMOL/L (ref 135–145)
TRIGL SERPL-MCNC: 211 MG/DL

## 2024-12-30 PROCEDURE — 99214 OFFICE O/P EST MOD 30 MIN: CPT | Mod: 25 | Performed by: FAMILY MEDICINE

## 2024-12-30 PROCEDURE — 80053 COMPREHEN METABOLIC PANEL: CPT

## 2024-12-30 PROCEDURE — 83036 HEMOGLOBIN GLYCOSYLATED A1C: CPT

## 2024-12-30 PROCEDURE — 99396 PREV VISIT EST AGE 40-64: CPT | Performed by: FAMILY MEDICINE

## 2024-12-30 PROCEDURE — 36415 COLL VENOUS BLD VENIPUNCTURE: CPT

## 2024-12-30 PROCEDURE — 80061 LIPID PANEL: CPT

## 2024-12-30 SDOH — HEALTH STABILITY: PHYSICAL HEALTH: ON AVERAGE, HOW MANY MINUTES DO YOU ENGAGE IN EXERCISE AT THIS LEVEL?: 20 MIN

## 2024-12-30 SDOH — HEALTH STABILITY: PHYSICAL HEALTH: ON AVERAGE, HOW MANY MINUTES DO YOU ENGAGE IN EXERCISE AT THIS LEVEL?: 0 MIN

## 2024-12-30 SDOH — HEALTH STABILITY: PHYSICAL HEALTH: ON AVERAGE, HOW MANY DAYS PER WEEK DO YOU ENGAGE IN MODERATE TO STRENUOUS EXERCISE (LIKE A BRISK WALK)?: 1 DAY

## 2024-12-30 SDOH — HEALTH STABILITY: PHYSICAL HEALTH: ON AVERAGE, HOW MANY DAYS PER WEEK DO YOU ENGAGE IN MODERATE TO STRENUOUS EXERCISE (LIKE A BRISK WALK)?: 0 DAYS

## 2024-12-30 ASSESSMENT — LIFESTYLE VARIABLES
AUDIT-C TOTAL SCORE: 2
SKIP TO QUESTIONS 9-10: 1
HOW MANY STANDARD DRINKS CONTAINING ALCOHOL DO YOU HAVE ON A TYPICAL DAY: 1 OR 2
HOW OFTEN DO YOU HAVE SIX OR MORE DRINKS ON ONE OCCASION: NEVER
HOW OFTEN DO YOU HAVE A DRINK CONTAINING ALCOHOL: 2-4 TIMES A MONTH

## 2024-12-30 ASSESSMENT — PAIN SCALES - GENERAL: PAINLEVEL_OUTOF10: NO PAIN (0)

## 2024-12-30 ASSESSMENT — SOCIAL DETERMINANTS OF HEALTH (SDOH): HOW OFTEN DO YOU GET TOGETHER WITH FRIENDS OR RELATIVES?: ONCE A WEEK
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
## 2025-01-02 LAB — NONINV COLON CA DNA+OCC BLD SCRN STL QL: NEGATIVE

## 2025-01-09 ENCOUNTER — OFFICE VISIT (OUTPATIENT)
Dept: FAMILY MEDICINE | Facility: OTHER | Age: 55
End: 2025-01-09
Attending: FAMILY MEDICINE
Payer: COMMERCIAL

## 2025-01-09 ENCOUNTER — TELEPHONE (OUTPATIENT)
Dept: FAMILY MEDICINE | Facility: OTHER | Age: 55
End: 2025-01-09

## 2025-01-09 VITALS
DIASTOLIC BLOOD PRESSURE: 88 MMHG | SYSTOLIC BLOOD PRESSURE: 138 MMHG | HEART RATE: 63 BPM | HEIGHT: 68 IN | BODY MASS INDEX: 35.95 KG/M2 | WEIGHT: 237.2 LBS | TEMPERATURE: 97.7 F | OXYGEN SATURATION: 99 %

## 2025-01-09 DIAGNOSIS — J06.9 VIRAL URI WITH COUGH: Primary | ICD-10-CM

## 2025-01-09 DIAGNOSIS — R09.82 POST-NASAL DRIP: ICD-10-CM

## 2025-01-09 RX ORDER — CODEINE PHOSPHATE AND GUAIFENESIN 10; 100 MG/5ML; MG/5ML
1-2 SOLUTION ORAL EVERY 6 HOURS PRN
Qty: 237 ML | Refills: 0 | Status: SHIPPED | OUTPATIENT
Start: 2025-01-09

## 2025-01-09 RX ORDER — BENZONATATE 100 MG/1
100 CAPSULE ORAL 3 TIMES DAILY PRN
Qty: 20 CAPSULE | Refills: 0 | Status: SHIPPED | OUTPATIENT
Start: 2025-01-09

## 2025-01-09 RX ORDER — FLUTICASONE PROPIONATE 50 MCG
1 SPRAY, SUSPENSION (ML) NASAL DAILY
Qty: 15.8 ML | Refills: 1 | Status: SHIPPED | OUTPATIENT
Start: 2025-01-09

## 2025-01-09 ASSESSMENT — PAIN SCALES - GENERAL: PAINLEVEL_OUTOF10: NO PAIN (0)

## 2025-01-09 NOTE — PROGRESS NOTES
The longitudinal plan of care for the diagnosis(es)/condition(s) as documented were addressed during this visit. Due to the added complexity in care, I will continue to support Harsh in the subsequent management and with ongoing continuity of care.    Assessment & Plan     Viral URI with cough  Reviewed pathologic cause of current cough, lungs are clear, work on PND and if not improving by 3-4 days from now, would send antibiotics for him  - benzonatate (TESSALON) 100 MG capsule; Take 1 capsule (100 mg) by mouth 3 times daily as needed for cough.  - fluticasone (FLONASE) 50 MCG/ACT nasal spray; Spray 1 spray into both nostrils daily.  - guaiFENesin-codeine (ROBITUSSIN AC) 100-10 MG/5ML solution; Take 5-10 mLs by mouth every 6 hours as needed for cough.    Post-nasal drip  As above  - benzonatate (TESSALON) 100 MG capsule; Take 1 capsule (100 mg) by mouth 3 times daily as needed for cough.  - fluticasone (FLONASE) 50 MCG/ACT nasal spray; Spray 1 spray into both nostrils daily.  - guaiFENesin-codeine (ROBITUSSIN AC) 100-10 MG/5ML solution; Take 5-10 mLs by mouth every 6 hours as needed for cough.        Patient was agreeable to this plan and had no further questions.  There are no Patient Instructions on file for this visit.    No follow-ups on file.    Subjective   Harsh is a 54 year old, presenting for the following health issues:  Cough        1/9/2025     9:41 AM   Additional Questions   Roomed by Alyson MERRITT   Accompanied by wife     History of Present Illness       Reason for visit:  Cough bronchitis  Symptom onset:  3-4 weeks ago  Symptom intensity:  Moderate  Symptom progression:  Staying the same  Had these symptoms before:  Yes  Has tried/received treatment for these symptoms:  No   He is taking medications regularly.       Acute Illness  Acute illness concerns: cough for 3 weeks   Onset/Duration: 3 weeks got a little better then got way worse again   Symptoms:  Fever: No  Chills/Sweats: No  Headache (location?):  "No  Sinus Pressure: YES  Conjunctivitis:  YES  Ear Pain: no  Rhinorrhea: YES  Congestion: YES  Sore Throat: YES  Cough: YES- productive with sputum unknown of color- deep cough   Wheeze: No  Decreased Appetite: No  Nausea: No  Vomiting: No  Diarrhea: No  Dysuria/Freq.: No  Dysuria or Hematuria: No  Fatigue/Achiness: YES- fatigue   Sick/Strep Exposure: No  Therapies tried and outcome: OTC cough medicine         Review of Systems  Constitutional, neuro, ENT, endocrine, pulmonary, cardiac, gastrointestinal, genitourinary, musculoskeletal, integument and psychiatric systems are negative, except as otherwise noted.      Objective    /88 (BP Location: Left arm, Patient Position: Sitting, Cuff Size: Adult Large)   Pulse 63   Temp 97.7  F (36.5  C) (Tympanic)   Ht 1.718 m (5' 7.64\")   Wt 107.6 kg (237 lb 3.2 oz)   SpO2 99%   BMI 36.45 kg/m    Body mass index is 36.45 kg/m .  Physical Exam   GENERAL: alert and no distress  EYES: Eyes grossly normal to inspection, PERRL and conjunctivae and sclerae normal  HENT: ear canals and TM's normal, nose and mouth without ulcers or lesions  NECK: no adenopathy, no asymmetry, masses, or scars  RESP: lungs clear to auscultation - no rales, rhonchi or wheezes  CV: regular rate and rhythm, normal S1 S2, no S3 or S4, no murmur, click or rub, no peripheral edema  MS: no gross musculoskeletal defects noted, no edema  PSYCH: mentation appears normal, affect normal/bright    No results found for any visits on 01/09/25.        Signed Electronically by: Jennifer Herbert MD    "

## 2025-01-09 NOTE — TELEPHONE ENCOUNTER
8:34 AM    Reason for Call: OVERBOOK    Patient is having the following symptoms: Has a cough for 3 weeks and it won't go away and wants to get it checked out.    The patient is requesting an appointment for today with Dr Herbert or anyone.    Was an appointment offered for this call? No  If yes : Appointment type              Date    Preferred method for responding to this message: Telephone Call  What is your phone number ? [phone]    If we cannot reach you directly, may we leave a detailed response at the number you provided? Yes    Can this message wait until your PCP/provider returns, if unavailable today?

## 2025-03-11 ENCOUNTER — TELEPHONE (OUTPATIENT)
Dept: FAMILY MEDICINE | Facility: OTHER | Age: 55
End: 2025-03-11

## 2025-03-11 DIAGNOSIS — E11.9 TYPE 2 DIABETES MELLITUS WITHOUT COMPLICATION, WITHOUT LONG-TERM CURRENT USE OF INSULIN (H): Primary | ICD-10-CM

## 2025-03-31 DIAGNOSIS — E78.00 PURE HYPERCHOLESTEROLEMIA: ICD-10-CM

## 2025-03-31 DIAGNOSIS — I10 ESSENTIAL HYPERTENSION: ICD-10-CM

## 2025-04-01 RX ORDER — LISINOPRIL 20 MG/1
20 TABLET ORAL 2 TIMES DAILY
Qty: 180 TABLET | Refills: 1 | Status: SHIPPED | OUTPATIENT
Start: 2025-04-01

## 2025-04-01 RX ORDER — ATORVASTATIN CALCIUM 10 MG/1
10 TABLET, FILM COATED ORAL DAILY
Qty: 90 TABLET | Refills: 1 | Status: SHIPPED | OUTPATIENT
Start: 2025-04-01

## 2025-04-01 RX ORDER — AMLODIPINE BESYLATE 5 MG/1
5 TABLET ORAL DAILY
Qty: 90 TABLET | Refills: 1 | Status: SHIPPED | OUTPATIENT
Start: 2025-04-01

## 2025-04-01 RX ORDER — METOPROLOL SUCCINATE 100 MG/1
100 TABLET, EXTENDED RELEASE ORAL DAILY
Qty: 90 TABLET | Refills: 1 | Status: SHIPPED | OUTPATIENT
Start: 2025-04-01

## 2025-04-03 DIAGNOSIS — E11.65 TYPE 2 DIABETES MELLITUS WITH HYPERGLYCEMIA, WITHOUT LONG-TERM CURRENT USE OF INSULIN (H): ICD-10-CM

## 2025-04-03 RX ORDER — METFORMIN HYDROCHLORIDE 500 MG/1
1000 TABLET, EXTENDED RELEASE ORAL AT BEDTIME
Qty: 180 TABLET | Refills: 2 | Status: SHIPPED | OUTPATIENT
Start: 2025-04-03

## 2025-04-03 NOTE — TELEPHONE ENCOUNTER
Patient out of refills.   Metformin 500 mg   Last Written Prescription Date:  12/20/2024  Last Fill Quantity: 180,   # refills: 2  Last Office Visit: 01/09/2025  Future Office visit:    Next 5 appointments (look out 90 days)      May 15, 2025 11:30 AM  (Arrive by 11:15 AM)  Provider Visit with Jennifer Herbert MD  [facility] ([facility] ) [address]  [place] [address]  [phone]

## 2025-04-20 ENCOUNTER — HEALTH MAINTENANCE LETTER (OUTPATIENT)
Age: 55
End: 2025-04-20

## 2025-05-15 ENCOUNTER — OFFICE VISIT (OUTPATIENT)
Dept: FAMILY MEDICINE | Facility: OTHER | Age: 55
End: 2025-05-15
Attending: FAMILY MEDICINE
Payer: COMMERCIAL

## 2025-05-15 ENCOUNTER — APPOINTMENT (OUTPATIENT)
Dept: LAB | Facility: OTHER | Age: 55
End: 2025-05-15
Payer: COMMERCIAL

## 2025-05-15 ENCOUNTER — RESULTS FOLLOW-UP (OUTPATIENT)
Dept: FAMILY MEDICINE | Facility: OTHER | Age: 55
End: 2025-05-15

## 2025-05-15 VITALS
BODY MASS INDEX: 38.31 KG/M2 | OXYGEN SATURATION: 97 % | DIASTOLIC BLOOD PRESSURE: 80 MMHG | SYSTOLIC BLOOD PRESSURE: 152 MMHG | TEMPERATURE: 98.7 F | HEIGHT: 66 IN | WEIGHT: 238.4 LBS | RESPIRATION RATE: 16 BRPM | HEART RATE: 63 BPM

## 2025-05-15 DIAGNOSIS — I10 ESSENTIAL HYPERTENSION: ICD-10-CM

## 2025-05-15 DIAGNOSIS — E11.65 TYPE 2 DIABETES MELLITUS WITH HYPERGLYCEMIA, WITHOUT LONG-TERM CURRENT USE OF INSULIN (H): Primary | ICD-10-CM

## 2025-05-15 DIAGNOSIS — E78.2 MIXED HYPERLIPIDEMIA: ICD-10-CM

## 2025-05-15 LAB
CREAT UR-MCNC: 190.9 MG/DL
EST. AVERAGE GLUCOSE BLD GHB EST-MCNC: 143 MG/DL
HBA1C MFR BLD: 6.6 %
HOLD SPECIMEN: NORMAL
MICROALBUMIN UR-MCNC: 179.6 MG/L
MICROALBUMIN/CREAT UR: 94.08 MG/G CR (ref 0–17)

## 2025-05-15 RX ORDER — AMLODIPINE BESYLATE 5 MG/1
7.5 TABLET ORAL DAILY
Qty: 135 TABLET | Refills: 1 | Status: SHIPPED | OUTPATIENT
Start: 2025-05-15

## 2025-05-15 ASSESSMENT — PAIN SCALES - GENERAL: PAINLEVEL_OUTOF10: MILD PAIN (2)

## 2025-05-15 NOTE — PROGRESS NOTES
The longitudinal plan of care for the diagnosis(es)/condition(s) as documented were addressed during this visit. Due to the added complexity in care, I will continue to support Harsh in the subsequent management and with ongoing continuity of care.    Assessment & Plan     Type 2 diabetes mellitus with hyperglycemia, without long-term current use of insulin (H)  Start ozempic and follow-up with Danay 1 month and me in 3 mos  - Hemoglobin A1c  - Albumin Random Urine Quantitative with Creat Ratio  - NC FOOT EXAM NO CHARGE  - Extra Urine (LAB USE ONLY)  - Extra Green Top Tube (LAB USE ONLY)  - Extra Purple Top EDTA (LAB USE ONLY)  - semaglutide (OZEMPIC) 2 MG/3ML pen; Inject 0.25 mg subcutaneously every 7 days.    Essential hypertension  Elevated, increase norvasc to 7.5 mg daily and follow-up 1 mo  Will likely be able to peel back once weight loss ensues  - Extra Urine (LAB USE ONLY)  - Extra Green Top Tube (LAB USE ONLY)  - Extra Purple Top EDTA (LAB USE ONLY)  - amLODIPine (NORVASC) 5 MG tablet; Take 1.5 tablets (7.5 mg) by mouth daily.    Mixed hyperlipidemia  stable  - Extra Urine (LAB USE ONLY)  - Extra Green Top Tube (LAB USE ONLY)  - Extra Purple Top EDTA (LAB USE ONLY)      Patient was agreeable to this plan and had no further questions.  Follow-up       Subjective   Harsh is a 55 year old, presenting for the following health issues:  Diabetes, Hypertension, and Lipids        5/15/2025    11:32 AM   Additional Questions   Roomed by Josie pereira   Accompanied by Self         5/15/2025    11:32 AM   Patient Reported Additional Medications   Patient reports taking the following new medications None     History of Present Illness       Diabetes:   He presents for follow up of diabetes.    He is not checking blood glucose.         He has no concerns regarding his diabetes at this time.   He is not experiencing numbness or burning in feet, excessive thirst, blurry vision, weight changes or redness, sores or blisters on  feet. The patient has had a diabetic eye exam in the last 12 months. Eye exam performed on [id number]. [facility].        Hyperlipidemia:  He presents for follow up of hyperlipidemia.   He is taking medication to lower cholesterol. He is not having myalgia or other side effects to statin medications.    Hypertension: He presents for follow up of hypertension.  He does not check blood pressure  regularly outside of the clinic. Outside blood pressures have been over 140/90. He does not follow a low salt diet.     He eats 0-1 servings of fruits and vegetables daily.He consumes 0 sweetened beverage(s) daily.He exercises with enough effort to increase his heart rate 9 or less minutes per day.  He exercises with enough effort to increase his heart rate 3 or less days per week.   He is taking medications regularly.        Diabetes Follow-up    How often are you checking your blood sugar? Not at all  What concerns do you have today about your diabetes? None   Do you have any of these symptoms? (Select all that apply)  No numbness or tingling in feet.  No redness, sores or blisters on feet.  No complaints of excessive thirst.  No reports of blurry vision.  No significant changes to weight.  Have you had a diabetic eye exam in the last 12 months? No, may be due now            Hyperlipidemia Follow-Up    Are you regularly taking any medication or supplement to lower your cholesterol?   Yes- atorvastatin  Are you having muscle aches or other side effects that you think could be caused by your cholesterol lowering medication? Yes    Hypertension Follow-up    Do you check your blood pressure regularly outside of the clinic? No   Are you following a low salt diet? No  Are your blood pressures ever more than 140 on the top number (systolic) OR more   than 90 on the bottom number (diastolic), for example 140/90? Yes    BP Readings from Last 2 Encounters:   05/15/25 (!) 152/80   01/09/25 138/88  "    Hemoglobin A1C (%)   Date Value   12/30/2024 6.0 (H)   07/29/2024 6.1 (H)   06/25/2019 5.8 (H)   01/23/2018 6.5 (H)     LDL Cholesterol Calculated (mg/dL)   Date Value   12/30/2024 105 (H)   12/20/2023 97   06/25/2019 99   01/23/2018 68         Review of Systems  Constitutional, neuro, ENT, endocrine, pulmonary, cardiac, gastrointestinal, genitourinary, musculoskeletal, integument and psychiatric systems are negative, except as otherwise noted.      Objective    BP (!) 152/80   Pulse 63   Temp 98.7  F (37.1  C) (Tympanic)   Resp 16   Ht 1.676 m (5' 6\")   Wt 108.1 kg (238 lb 6.4 oz)   SpO2 97%   BMI 38.48 kg/m    Body mass index is 38.48 kg/m .  Physical Exam   Diabetic Foot Screen:  Any complaints of increased pain or numbness ? No  Is there a foot ulcer now or a history of foot ulcer? No  Does the foot have an abnormal shape? No  Are the nails thick, too long or ingrown? No  Are there any redness or open areas? No         Sensation Testing done at all points on the diagram with monofilament     Right Foot: Sensation Normal at all points  Left Foot: Sensation Normal at all points     Risk Category: 0- No loss of protective sensation  Performed by Jennifer Herbert MD    GENERAL: alert and no distress  RESP: lungs clear to auscultation - no rales, rhonchi or wheezes  CV: regular rate and rhythm, normal S1 S2, no S3 or S4, no murmur, click or rub, no peripheral edema  ABDOMEN: soft, nontender, no hepatosplenomegaly, no masses and bowel sounds normal  MS: no gross musculoskeletal defects noted, no edema  PSYCH: mentation appears normal, affect normal/bright  Diabetic foot exam: normal DP and PT pulses, no trophic changes or ulcerative lesions, normal sensory exam, and normal monofilament exam    Results for orders placed or performed in visit on 05/15/25   Hemoglobin A1c     Status: Abnormal   Result Value Ref Range    Estimated Average Glucose 143 (H) <117 mg/dL    Hemoglobin A1C 6.6 (H) <5.7 %   Albumin " Random Urine Quantitative with Creat Ratio     Status: Abnormal   Result Value Ref Range    Creatinine Urine mg/dL 190.9 mg/dL    Albumin Urine mg/L 179.6 mg/L    Albumin Urine mg/g Cr 94.08 (H) 0.00 - 17.00 mg/g Cr   Extra Urine (LAB USE ONLY)     Status: None   Result Value Ref Range    Hold Specimen JIC    Extra Green Top Tube (LAB USE ONLY)     Status: None   Result Value Ref Range    Hold Specimen JIC    Extra Purple Top EDTA (LAB USE ONLY)     Status: None   Result Value Ref Range    Hold Specimen JIC            Signed Electronically by: Jennifer Herbert MD

## 2025-06-16 ENCOUNTER — OFFICE VISIT (OUTPATIENT)
Dept: FAMILY MEDICINE | Facility: OTHER | Age: 55
End: 2025-06-16
Attending: STUDENT IN AN ORGANIZED HEALTH CARE EDUCATION/TRAINING PROGRAM
Payer: COMMERCIAL

## 2025-06-16 VITALS
RESPIRATION RATE: 18 BRPM | HEART RATE: 63 BPM | HEIGHT: 66 IN | DIASTOLIC BLOOD PRESSURE: 87 MMHG | TEMPERATURE: 98 F | WEIGHT: 234.8 LBS | SYSTOLIC BLOOD PRESSURE: 146 MMHG | OXYGEN SATURATION: 98 % | BODY MASS INDEX: 37.73 KG/M2

## 2025-06-16 DIAGNOSIS — E11.65 TYPE 2 DIABETES MELLITUS WITH HYPERGLYCEMIA, WITHOUT LONG-TERM CURRENT USE OF INSULIN (H): ICD-10-CM

## 2025-06-16 DIAGNOSIS — I10 ESSENTIAL HYPERTENSION: ICD-10-CM

## 2025-06-16 NOTE — PROGRESS NOTES
Assessment & Plan     Type 2 diabetes mellitus with hyperglycemia, without long-term current use of insulin (H)  55-year-old male presented to clinic today to follow-up on starting Ozempic.  Tolerating Ozempic well.  Denies any significant side effects.  Has lost approximately 4 pounds since starting medication.  Will be increasing dose to 0.5 mg today.  Discussed follow-up in 6 weeks after being on the next dose for 1 month.  Discussed in detail the importance of lifestyle modifications while being on Ozempic.  Advise continue to monitor.  Patient was given the opportunity answer questions and all were answered with apparent sex satisfaction  - semaglutide (OZEMPIC) 2 MG/3ML pen; Inject 0.25 mg subcutaneously every 7 days.                Subjective   Harsh is a 55 year old, presenting for the following health issues:  1 month weight //ozempic      6/16/2025     4:05 PM   Additional Questions   Roomed by Radha PALOMARES      Medication Followup of Ozempic  Taking Medication as prescribed: yes  Side Effects:  None  Medication Helping Symptoms:  yes- patient feels full faster and stay full longer during the day        Review of Systems  Constitutional, HEENT, cardiovascular, pulmonary, GI, , musculoskeletal, neuro, skin, endocrine and psych systems are negative, except as otherwise noted.      Objective    There were no vitals taken for this visit.  There is no height or weight on file to calculate BMI.  Physical Exam   GENERAL: alert and no distress  EYES: Eyes grossly normal to inspection,   RESP: lungs clear to auscultation - no rales, rhonchi or wheezes  CV: regular rate and rhythm, normal S1 S2, no S3 or S4, no murmur, click or rub, no peripheral edema  ABDOMEN: soft, nontender, no masses and bowel sounds normal  MS: no gross musculoskeletal defects noted, no edema  PSYCH: mentation appears normal, affect normal/bright        Signed Electronically by: Danay Michael PA-C

## 2025-06-17 ENCOUNTER — TELEPHONE (OUTPATIENT)
Dept: FAMILY MEDICINE | Facility: OTHER | Age: 55
End: 2025-06-17

## 2025-06-17 DIAGNOSIS — E11.65 TYPE 2 DIABETES MELLITUS WITH HYPERGLYCEMIA, WITHOUT LONG-TERM CURRENT USE OF INSULIN (H): Primary | ICD-10-CM

## 2025-06-17 RX ORDER — GEMFIBROZIL 600 MG/1
600 TABLET, FILM COATED ORAL 2 TIMES DAILY
Qty: 180 TABLET | Refills: 3 | Status: SHIPPED | OUTPATIENT
Start: 2025-06-17

## 2025-06-17 NOTE — TELEPHONE ENCOUNTER
Per OV notes on 6/16/25, patient was to start 0.5 mg dose.   Rx sent in for 0.25 mg.   New Rx pended for 0.5 mg.  Thank you.     Type 2 diabetes mellitus with hyperglycemia, without long-term current use of insulin (H)  55-year-old male presented to clinic today to follow-up on starting Ozempic.  Tolerating Ozempic well.  Denies any significant side effects.  Has lost approximately 4 pounds since starting medication.  Will be increasing dose to 0.5 mg today.  Discussed follow-up in 6 weeks after being on the next dose for 1 month.  Discussed in detail the importance of lifestyle modifications while being on Ozempic.  Advise continue to monitor.  Patient was given the opportunity answer questions and all were answered with apparent sex satisfaction  - semaglutide (OZEMPIC) 2 MG/3ML pen; Inject 0.25 mg subcutaneously every 7 days.

## 2025-06-17 NOTE — TELEPHONE ENCOUNTER
gemfibrozil (LOPID) 600 MG tablet       Last Written Prescription Date:  3/25/2024  Last Fill Quantity: 180,   # refills: 3  Last Office Visit: 6/16/2025  Future Office visit:    Next 5 appointments (look out 90 days)      Jul 23, 2025 4:30 PM  (Arrive by 4:15 PM)  Provider Visit with Danay Michael PA-C  [facility] ([facility] [place] ) [address] [place] [address] AVE  [place] [address]  [phone]

## 2025-07-23 ENCOUNTER — OFFICE VISIT (OUTPATIENT)
Dept: FAMILY MEDICINE | Facility: OTHER | Age: 55
End: 2025-07-23
Attending: STUDENT IN AN ORGANIZED HEALTH CARE EDUCATION/TRAINING PROGRAM
Payer: COMMERCIAL

## 2025-07-23 VITALS
SYSTOLIC BLOOD PRESSURE: 138 MMHG | WEIGHT: 228.5 LBS | DIASTOLIC BLOOD PRESSURE: 80 MMHG | BODY MASS INDEX: 36.72 KG/M2 | TEMPERATURE: 97.7 F | HEART RATE: 59 BPM | HEIGHT: 66 IN | OXYGEN SATURATION: 100 % | RESPIRATION RATE: 16 BRPM

## 2025-07-23 DIAGNOSIS — E11.65 TYPE 2 DIABETES MELLITUS WITH HYPERGLYCEMIA, WITHOUT LONG-TERM CURRENT USE OF INSULIN (H): Primary | ICD-10-CM

## 2025-07-23 DIAGNOSIS — I10 ESSENTIAL HYPERTENSION: ICD-10-CM

## 2025-07-23 ASSESSMENT — PAIN SCALES - GENERAL: PAINLEVEL_OUTOF10: NO PAIN (0)

## 2025-07-23 NOTE — PROGRESS NOTES
"  Assessment & Plan     Type 2 diabetes mellitus with hyperglycemia, without long-term current use of insulin (H)  Doing well. Tolerating increase in Ozempic. Currently at 0.5mg per week. Discussed when he is done with the 0.5mg, will increase to 1mg weekly.  Has endorsed lifestyle modifications including varied diet. Discussed importance of continuing with a varied healthy diet along with exercise.  Has upcoming yearly physical and will follow-up at that time.   - Semaglutide, 1 MG/DOSE, (OZEMPIC) 4 MG/3ML pen; Inject 1 mg subcutaneously every 7 days.    Essential hypertension  Stable. Discussed continuing with current medication.  Will continue to monitor.    BMI  Estimated body mass index is 36.88 kg/m  as calculated from the following:    Height as of this encounter: 1.676 m (5' 6\").    Weight as of this encounter: 103.6 kg (228 lb 8 oz).           Philip House is a 55 year old, presenting for the following health issues:  Diabetes and Medication Follow-up (Ozempic)        7/23/2025     4:17 PM   Additional Questions   Roomed by Josie pereira   Accompanied by Self         7/23/2025     4:17 PM   Patient Reported Additional Medications   Patient reports taking the following new medications None     HPI      Medication Followup of Ozempic 0.5 mg:  Taking Medication as prescribed: yes  Side Effects:  None  Medication Helping Symptoms:  yes, has lost 6 lbs since last visit          Review of Systems  Constitutional, HEENT, cardiovascular, pulmonary, GI, , musculoskeletal, neuro, skin, endocrine and psych systems are negative, except as otherwise noted.      Objective    /80 (BP Location: Left arm, Patient Position: Sitting, Cuff Size: Adult Regular)   Pulse 59   Temp 97.7  F (36.5  C) (Tympanic)   Resp 16   Ht 1.676 m (5' 6\")   Wt 103.6 kg (228 lb 8 oz)   SpO2 100%   BMI 36.88 kg/m    Body mass index is 36.88 kg/m .  Physical Exam   GENERAL: alert and no distress  RESP: lungs clear to auscultation - " no rales, rhonchi or wheezes  CV: regular rate and rhythm, normal S1 S2, no S3 or S4, no murmur, click or rub, no peripheral edema  ABDOMEN: soft, nontender,  no masses and bowel sounds normal  MS: no gross musculoskeletal defects noted, no edema  PSYCH: mentation appears normal, affect normal/bright            Signed Electronically by: Danay Michael PA-C

## 2025-08-21 DIAGNOSIS — E11.65 TYPE 2 DIABETES MELLITUS WITH HYPERGLYCEMIA, WITHOUT LONG-TERM CURRENT USE OF INSULIN (H): ICD-10-CM

## 2025-08-21 RX ORDER — SEMAGLUTIDE 1.34 MG/ML
INJECTION, SOLUTION SUBCUTANEOUS
Qty: 3 ML | Refills: 0 | Status: SHIPPED | OUTPATIENT
Start: 2025-08-21

## 2025-08-24 ENCOUNTER — HEALTH MAINTENANCE LETTER (OUTPATIENT)
Age: 55
End: 2025-08-24

## (undated) DEVICE — ESU LIGASURE MARYLAND VESSEL LAP 44CM XLONG LF1944

## (undated) DEVICE — LINEN TOWEL PACK X30 5481

## (undated) DEVICE — ENDO TROCAR OPTICAL ACCESS KII Z-THRD 15X100MM C0R37

## (undated) DEVICE — DRAPE SHEET REV FOLD 3/4 9349

## (undated) DEVICE — Device

## (undated) DEVICE — ANTIFOG SOLUTION W/FOAM PAD 31142527

## (undated) DEVICE — ENDO POUCH UNIVERSAL RETRIEVAL SYSTEM INZII 12/15MM CD004

## (undated) DEVICE — SOL WATER IRRIG 1000ML BOTTLE 2F7114

## (undated) DEVICE — STPL SKIN 35W ROTATING HEAD PRW35

## (undated) DEVICE — LINEN TOWEL PACK X6 WHITE 5487

## (undated) DEVICE — NDL INSUFFLATION 13GA 150MM C2202

## (undated) DEVICE — CLIP APPLIER ENDO 5MM M/L LIGAMAX EL5ML

## (undated) DEVICE — STPL RELOAD REG TISSUE ECHELON 60 X 3.6MM BLUE GST60B

## (undated) DEVICE — ENDO TROCAR SLEEVE KII Z-THREADED 05X100MM CTS02

## (undated) DEVICE — DEVICE ENDO STITCH APPLIER 10MM 173016

## (undated) DEVICE — PREP CHLORAPREP 26ML TINTED ORANGE  260815

## (undated) DEVICE — ENDO TROCAR FIRST ENTRY KII FIOS Z-THRD 05X100MM CTF03

## (undated) DEVICE — STPL POWERED ECHELON LONG 60MM PLEE60A

## (undated) DEVICE — DRSG PRIMAPORE 02X3" 7133

## (undated) RX ORDER — PHENYLEPHRINE HCL IN 0.9% NACL 1 MG/10 ML
SYRINGE (ML) INTRAVENOUS
Status: DISPENSED
Start: 2018-08-28

## (undated) RX ORDER — EPHEDRINE SULFATE 50 MG/ML
INJECTION, SOLUTION INTRAMUSCULAR; INTRAVENOUS; SUBCUTANEOUS
Status: DISPENSED
Start: 2018-08-28

## (undated) RX ORDER — CEFAZOLIN SODIUM 2 G/100ML
INJECTION, SOLUTION INTRAVENOUS
Status: DISPENSED
Start: 2018-08-28

## (undated) RX ORDER — FENTANYL CITRATE 50 UG/ML
INJECTION, SOLUTION INTRAMUSCULAR; INTRAVENOUS
Status: DISPENSED
Start: 2018-08-28

## (undated) RX ORDER — DEXAMETHASONE SODIUM PHOSPHATE 4 MG/ML
INJECTION, SOLUTION INTRA-ARTICULAR; INTRALESIONAL; INTRAMUSCULAR; INTRAVENOUS; SOFT TISSUE
Status: DISPENSED
Start: 2018-08-28

## (undated) RX ORDER — GABAPENTIN 300 MG/1
CAPSULE ORAL
Status: DISPENSED
Start: 2018-08-28

## (undated) RX ORDER — ACETAMINOPHEN 325 MG/1
TABLET ORAL
Status: DISPENSED
Start: 2018-08-28

## (undated) RX ORDER — KETOROLAC TROMETHAMINE 30 MG/ML
INJECTION, SOLUTION INTRAMUSCULAR; INTRAVENOUS
Status: DISPENSED
Start: 2018-08-28

## (undated) RX ORDER — PROPOFOL 10 MG/ML
INJECTION, EMULSION INTRAVENOUS
Status: DISPENSED
Start: 2018-08-28

## (undated) RX ORDER — HYDROMORPHONE HYDROCHLORIDE 1 MG/ML
INJECTION, SOLUTION INTRAMUSCULAR; INTRAVENOUS; SUBCUTANEOUS
Status: DISPENSED
Start: 2018-08-28

## (undated) RX ORDER — ONDANSETRON 2 MG/ML
INJECTION INTRAMUSCULAR; INTRAVENOUS
Status: DISPENSED
Start: 2018-08-28

## (undated) RX ORDER — HYDRALAZINE HYDROCHLORIDE 20 MG/ML
INJECTION INTRAMUSCULAR; INTRAVENOUS
Status: DISPENSED
Start: 2018-08-28

## (undated) RX ORDER — SODIUM CHLORIDE, SODIUM LACTATE, POTASSIUM CHLORIDE, CALCIUM CHLORIDE 600; 310; 30; 20 MG/100ML; MG/100ML; MG/100ML; MG/100ML
INJECTION, SOLUTION INTRAVENOUS
Status: DISPENSED
Start: 2018-08-28